# Patient Record
Sex: MALE | Race: WHITE | NOT HISPANIC OR LATINO | Employment: FULL TIME | URBAN - METROPOLITAN AREA
[De-identification: names, ages, dates, MRNs, and addresses within clinical notes are randomized per-mention and may not be internally consistent; named-entity substitution may affect disease eponyms.]

---

## 2017-04-12 ENCOUNTER — HOSPITAL ENCOUNTER (EMERGENCY)
Facility: HOSPITAL | Age: 57
Discharge: HOME | End: 2017-04-12
Attending: EMERGENCY MEDICINE | Admitting: EMERGENCY MEDICINE
Payer: COMMERCIAL

## 2017-04-12 VITALS
WEIGHT: 167 LBS | OXYGEN SATURATION: 96 % | BODY MASS INDEX: 22.13 KG/M2 | RESPIRATION RATE: 18 BRPM | HEART RATE: 96 BPM | SYSTOLIC BLOOD PRESSURE: 149 MMHG | DIASTOLIC BLOOD PRESSURE: 71 MMHG | TEMPERATURE: 98 F | HEIGHT: 73 IN

## 2017-04-12 DIAGNOSIS — S20.211A RIB CONTUSION, RIGHT, INITIAL ENCOUNTER: ICD-10-CM

## 2017-04-12 DIAGNOSIS — R07.89 CHEST WALL PAIN: Primary | ICD-10-CM

## 2017-04-12 PROCEDURE — 99284 EMERGENCY DEPT VISIT MOD MDM: CPT

## 2017-04-12 PROCEDURE — 99900035 HC TECH TIME PER 15 MIN (STAT)

## 2017-04-12 PROCEDURE — 99284 EMERGENCY DEPT VISIT MOD MDM: CPT | Mod: ,,, | Performed by: PHYSICIAN ASSISTANT

## 2017-04-12 PROCEDURE — 25000003 PHARM REV CODE 250: Performed by: PHYSICIAN ASSISTANT

## 2017-04-12 RX ORDER — HYDROCODONE BITARTRATE AND ACETAMINOPHEN 5; 325 MG/1; MG/1
1 TABLET ORAL EVERY 6 HOURS PRN
Qty: 15 TABLET | Refills: 0 | Status: SHIPPED | OUTPATIENT
Start: 2017-04-12 | End: 2017-04-26

## 2017-04-12 RX ORDER — HYDROCODONE BITARTRATE AND ACETAMINOPHEN 5; 325 MG/1; MG/1
1 TABLET ORAL
Status: COMPLETED | OUTPATIENT
Start: 2017-04-12 | End: 2017-04-12

## 2017-04-12 RX ADMIN — HYDROCODONE BITARTRATE AND ACETAMINOPHEN 1 TABLET: 5; 325 TABLET ORAL at 07:04

## 2017-04-12 NOTE — ED PROVIDER NOTES
Encounter Date: 4/12/2017    SCRIBE #1 NOTE: I, Cal Wynne, am scribing for, and in the presence of,  Dr. Cain . I have scribed the following portions of the note - Other sections scribed: CXR Reading .       History     Chief Complaint   Patient presents with    Rib Injury     slipped and hit L ribs on back of truck     Review of patient's allergies indicates:  No Known Allergies  HPI Comments: Patient is a 57-year-old male with history of coronary artery disease who presents to the emergency department with rib injury.  Patient states he was at work this morning.  He states that presently 5:30, he fell off of a ladder from 1.5 feet.  Patient states he fell on his left chest wall.  Patient states he is having 9 out of 10 pain on the left side of his ribs.  Patient reports mild bruising to the area.  Patient states he does not feel short of breath, but when he takes a deep breath he has pain.  Patient denies hitting his head or loss of consciousness.  Patient denies any neck or back pain.    The history is provided by the patient.     Past Medical History:   Diagnosis Date    Coronary artery disease      Past Surgical History:   Procedure Laterality Date    BYPASS GRAFT      triple bypass 2009     History reviewed. No pertinent family history.  Social History   Substance Use Topics    Smoking status: Current Every Day Smoker     Packs/day: 1.50     Years: 20.00     Types: Cigarettes    Smokeless tobacco: Never Used    Alcohol use Yes     Review of Systems   Constitutional: Negative for activity change, appetite change, chills, fatigue and fever.   HENT: Negative for congestion, ear discharge, ear pain, hearing loss, mouth sores, postnasal drip, rhinorrhea, sore throat and trouble swallowing.    Respiratory: Negative for cough and shortness of breath.    Cardiovascular: Negative for chest pain.   Gastrointestinal: Negative for abdominal pain, blood in stool, constipation, diarrhea, nausea and vomiting.    Genitourinary: Negative for dysuria, flank pain and hematuria.   Musculoskeletal: Negative for back pain, neck pain and neck stiffness.        Left Rib cage pain   Skin: Negative for rash and wound.   Neurological: Negative for dizziness, syncope, speech difficulty, weakness, light-headedness, numbness and headaches.       Physical Exam   Initial Vitals   BP Pulse Resp Temp SpO2   04/12/17 0703 04/12/17 0703 04/12/17 0703 04/12/17 0703 04/12/17 0703   149/71 96 18 98.2 °F (36.8 °C) 96 %     Physical Exam    Nursing note and vitals reviewed.  Constitutional: He appears well-developed and well-nourished. He is not diaphoretic.  Non-toxic appearance. No distress.   HENT:   Head: Normocephalic.   Right Ear: Hearing and external ear normal.   Left Ear: Hearing and external ear normal.   Nose: Nose normal.   Mouth/Throat: Uvula is midline and oropharynx is clear and moist. No trismus in the jaw. No uvula swelling. No oropharyngeal exudate.   Eyes: Conjunctivae are normal. Pupils are equal, round, and reactive to light.   Neck: Normal range of motion and full passive range of motion without pain. Neck supple. No spinous process tenderness and no muscular tenderness present. Normal range of motion present. No rigidity.   Cardiovascular: Normal rate and regular rhythm.   Pulmonary/Chest: Breath sounds normal. No respiratory distress. He has no wheezes. He has no rhonchi. He has no rales. He exhibits tenderness (left lateral mid rib cage; no crepitus; mild ecchymosis; no flail chest).   Abdominal: Soft. Bowel sounds are normal. He exhibits no distension and no mass. There is no tenderness. There is no rebound and no guarding.   Musculoskeletal:   No midline tenderness in the cervical, thoracic, or lumbar region.  No step-offs or crepitus noted.  No saddle anesthesia.  Normal strength in upper and lower extremities.       Lymphadenopathy:     He has no cervical adenopathy.   Neurological: He is alert and oriented to person,  place, and time.   Skin: Skin is warm and dry.   Psychiatric: He has a normal mood and affect.         ED Course   Procedures  Labs Reviewed - No data to display       X-Rays:   Independently Interpreted Readings:   Chest X-Ray: post sternotomy evidence noted without pneumothorax.    Other Readings:  No acute cardiopulmonary process    Medical Decision Making:   History:   Old Medical Records: I decided to obtain old medical records.  Initial Assessment:   Urgent evaluation of a 57-year-old male with history of coronary artery disease who presents to the emergency department with wrist injury.  Patient is afebrile, nontoxic appearing, and hemodynamically stable.  Lungs are clear to auscultation.  Lung sounds are equal bilaterally.  I do not suspect pneumothorax.  On exam, there is tenderness in the left lateral mid rib cage with no crepitus.  There is mild ecchymosis.  No flail chest.  X-ray will be obtained and patient will be given analgesic.  No other injury.  Independently Interpreted Test(s):   I have ordered and independently interpreted X-rays - see prior notes.  Clinical Tests:   Radiological Study: Ordered and Reviewed  ED Management:  7:56 AM  I have independently interpreted chest film.  No evidence of rib fracture.  Incentive spirometry training will be performed for patient.  He will be sent home with analgesic.  He is advised to follow-up with PCP or return to the emergency department with any worsening symptoms or concerns.  Case is discussed in depth with supervising physician who agrees with plan.  Other:   I have discussed this case with another health care provider.       <> Summary of the Discussion: Dr. Payton Emery Attestation:   Scribe #1: I performed the above scribed service and the documentation accurately describes the services I performed. I attest to the accuracy of the note.    Attending Attestation:           Physician Attestation for Scribe:  Physician Attestation  Statement for Scribe #1: I, Dr. Cain , reviewed documentation, as scribed by Cal Wynne  in my presence, and it is both accurate and complete.                 ED Course     Clinical Impression:   The primary encounter diagnosis was Chest wall pain. A diagnosis of Rib contusion, right, initial encounter was also pertinent to this visit.          Corrie De Paz PA-C  04/12/17 0823       Corrie De Paz PA-C  04/12/17 0905

## 2017-04-12 NOTE — ED NOTES
Patient identifiers verified and correct for Mina Jo.    LOC: The patient is awake, alert and aware of environment with an appropriate affect, the patient is oriented x 3 and speaking appropriately.  APPEARANCE: Patient resting comfortably and in no acute distress, patient is clean and well groomed, patient's clothing is properly fastened.  SKIN: The skin is warm and dry, color consistent with ethnicity, patient has normal skin turgor and moist mucus membranes, skin intact, no breakdown or bruising noted.  MUSCULOSKELETAL: Patient moving all extremities spontaneously, no obvious swelling or deformities noted. Left sided rib pain with palpation. Red bruise noted to the left lateral side.

## 2017-04-12 NOTE — ED AVS SNAPSHOT
[facility]  [address]  [facility] [address]               Mina Jo   2017  7:04 AM   ED    Description:  Male : 1960   Department:  [facility]           Your Care was Coordinated By:     Provider Role From To    Riley Cain MD Attending Provider 17 --    Corrie Graham-JENNYFER Pacheco Physician Assistant 17 --      Reason for Visit     Rib Injury           Diagnoses this Visit        Comments    Chest wall pain    -  Primary     Rib contusion, right, initial encounter           ED Disposition     None           To Do List           Follow-up Information     Follow up with PCP In 2 days.       These Medications        Disp Refills Start End    hydrocodone-acetaminophen 5-325mg (NORCO) 5-325 mg per tablet 15 tablet 0 2017     Take 1 tablet by mouth every 6 (six) hours as needed for Pain. - Oral    Pharmacy: [facility] #[id number] - [place] - [address] Ph #: [phone]         [facility] On Call     [facility] Care Line -  Assistance  Unless otherwise directed by your provider, please contact [facility], our nurse care line that is available for  assistance.     Registered nurses in the [facility] provide: appointment scheduling, clinical advisement, health education, and other advisory services.  Call: [phone] (toll free)               Medications           Message regarding Medications     Verify the changes and/or additions to your medication regime listed below are the same as discussed with your clinician today.  If any of these changes or additions are incorrect, please notify your healthcare provider.        START taking these NEW medications        Refills    hydrocodone-acetaminophen 5-325mg (NORCO) 5-325 mg per tablet 0    Sig: Take 1 tablet by mouth every 6 (six) hours as needed for Pain.    Class: Print    Route:  "Oral      These medications were administered today        Dose Freq    hydrocodone-acetaminophen 5-325mg per tablet 1 tablet 1 tablet ED 1 Time    Sig: Take 1 tablet by mouth ED 1 Time.    Class: Normal    Route: Oral           Verify that the below list of medications is an accurate representation of the medications you are currently taking.  If none reported, the list may be blank. If incorrect, please contact your healthcare provider. Carry this list with you in case of emergency.           Current Medications     hydrocodone-acetaminophen 5-325mg (NORCO) 5-325 mg per tablet Take 1 tablet by mouth every 6 (six) hours as needed for Pain.           Clinical Reference Information           Your Vitals Were     BP Pulse Temp Resp Height Weight    149/71 96 98.2 °F (36.8 °C) (Oral) 18 6' 1" (1.854 m) 75.8 kg (167 lb)    SpO2 BMI             96% 22.03 kg/m2         Allergies as of 4/12/2017     No Known Allergies      Immunizations Administered on Date of Encounter - 4/12/2017     None      ED Micro, Lab, POCT     None      ED Imaging Orders     Start Ordered       Status Ordering Provider    04/12/17 0731 04/12/17 0730  X-Ray Chest PA And Lateral  1 time imaging      Final result         Discharge Instructions             Chest Contusion    A contusion is a bruise to the skin, muscle, or ribs. It may cause pain, tenderness, and swelling. It may turn the skin purple until it heals. Contusions take a few days to a few weeks to heal.  Home care  Follow these guidelines when caring for yourself at home:  · Rest. Dont do any heavy lifting or strenuous activity. Dont do any activity that causes pain.  · Put an ice pack on the injured area. Do this for 20 minutes every 1 to 2 hours the first day. You can make an ice pack by wrapping a plastic bag of ice cubes in a thin towel. Continue to use the ice pack 3 to 4 times a day for the next 2 days. Then use the ice pack as needed to ease pain and swelling.  · After 1 to 2 days " you may put a warm compress on the area. Do this for 10 minutes several times a day. A warm compress is a clean cloth thats damp with warm water.  · Hold a pillow to the affected area when you cough. This will help ease pain.  · You may use acetaminophen or ibuprofen to control pain, unless another pain medicine was prescribed. If you have chronic liver or kidney disease, talk with your health care provider before using these medicines. Also talk with your provider if youve had a stomach ulcer or GI bleeding.  Follow-up care  Follow up with your health care provider during the next week, or as advised.  When to seek medical advice  Call your health care provider right away if any of these occur:  · Shortness of breath, difficulty breathing, or breathing fast  · Chest pain gets worse when you breathe  · Severe pain that comes on suddenly or lasts more than an hour  · Dizziness, weakness, or fainting  · New abdominal pain or abdominal pain that gets worse  ·  Fever of 101ºF (38.3ºC) or higher, or as directed by your health care provider  Date Last Reviewed: 2/15/2015  © 9744-0809 [employer]. [address] [place] [address]. All rights reserved. This information is not intended as a substitute for professional medical care. Always follow your healthcare professional's instructions.          [facility] Sign-Up     Activating your [facility] account is as easy as 1-2-3!     1) Visit [email or web address], select Sign Up Now, enter this activation code and your date of birth, then select Next.  [email or web address]  Expires: 5/27/2017  7:59 AM      2) Create a username and password to use when you visit [facility] in the future and select a security question in case you lose your password and select Next.    3) Enter your e-mail address and click Sign Up!    Additional Information  If you have questions, please e-mail [email or web address] or call [phone] to talk to our [facility] staff. Remember,  [facility] is NOT to be used for urgent needs. For medical emergencies, dial 911.          [facility] complies with applicable Federal civil rights laws and does not discriminate on the basis of race, color, national origin, age, disability, or sex.        Language Assistance Services     ATTENTION: Language assistance services are available, free of charge. Please call [phone].      ATENCIÓN: Si habla español, tiene a ureña disposición servicios gratuitos de asistencia lingüística. Llame al [phone].     CHÚ Ý: N?u b?n nói Ti?ng Vi?t, có các d?ch v? h? tr? ngôn ng? mi?n phí dành cho b?n. G?i s? [phone].

## 2017-04-12 NOTE — DISCHARGE INSTRUCTIONS
Chest Contusion    A contusion is a bruise to the skin, muscle, or ribs. It may cause pain, tenderness, and swelling. It may turn the skin purple until it heals. Contusions take a few days to a few weeks to heal.  Home care  Follow these guidelines when caring for yourself at home:  · Rest. Dont do any heavy lifting or strenuous activity. Dont do any activity that causes pain.  · Put an ice pack on the injured area. Do this for 20 minutes every 1 to 2 hours the first day. You can make an ice pack by wrapping a plastic bag of ice cubes in a thin towel. Continue to use the ice pack 3 to 4 times a day for the next 2 days. Then use the ice pack as needed to ease pain and swelling.  · After 1 to 2 days you may put a warm compress on the area. Do this for 10 minutes several times a day. A warm compress is a clean cloth thats damp with warm water.  · Hold a pillow to the affected area when you cough. This will help ease pain.  · You may use acetaminophen or ibuprofen to control pain, unless another pain medicine was prescribed. If you have chronic liver or kidney disease, talk with your health care provider before using these medicines. Also talk with your provider if youve had a stomach ulcer or GI bleeding.  Follow-up care  Follow up with your health care provider during the next week, or as advised.  When to seek medical advice  Call your health care provider right away if any of these occur:  · Shortness of breath, difficulty breathing, or breathing fast  · Chest pain gets worse when you breathe  · Severe pain that comes on suddenly or lasts more than an hour  · Dizziness, weakness, or fainting  · New abdominal pain or abdominal pain that gets worse  ·  Fever of 101ºF (38.3ºC) or higher, or as directed by your health care provider  Date Last Reviewed: 2/15/2015  © 3988-1681 [employer]. [address] [place] [address], [place] [address]. All rights reserved. This information is not intended as a  substitute for professional medical care. Always follow your healthcare professional's instructions.

## 2017-04-26 ENCOUNTER — HOSPITAL ENCOUNTER (EMERGENCY)
Facility: HOSPITAL | Age: 57
Discharge: HOME | End: 2017-04-26
Attending: EMERGENCY MEDICINE
Payer: COMMERCIAL

## 2017-04-26 VITALS
RESPIRATION RATE: 15 BRPM | HEIGHT: 72 IN | SYSTOLIC BLOOD PRESSURE: 108 MMHG | OXYGEN SATURATION: 96 % | HEART RATE: 82 BPM | WEIGHT: 165 LBS | DIASTOLIC BLOOD PRESSURE: 61 MMHG | BODY MASS INDEX: 22.35 KG/M2 | TEMPERATURE: 98 F

## 2017-04-26 DIAGNOSIS — S05.02XA CORNEAL ABRASION, LEFT, INITIAL ENCOUNTER: ICD-10-CM

## 2017-04-26 DIAGNOSIS — T15.02XA CORNEAL FB (FOREIGN BODY), LEFT, INITIAL ENCOUNTER: Primary | ICD-10-CM

## 2017-04-26 PROCEDURE — 99284 EMERGENCY DEPT VISIT MOD MDM: CPT | Mod: 25

## 2017-04-26 PROCEDURE — 65220 REMOVE FOREIGN BODY FROM EYE: CPT

## 2017-04-26 PROCEDURE — 25000003 PHARM REV CODE 250: Performed by: EMERGENCY MEDICINE

## 2017-04-26 RX ORDER — PROPARACAINE HYDROCHLORIDE 5 MG/ML
1 SOLUTION/ DROPS OPHTHALMIC
Status: COMPLETED | OUTPATIENT
Start: 2017-04-26 | End: 2017-04-26

## 2017-04-26 RX ORDER — MOXIFLOXACIN 5 MG/ML
1 SOLUTION/ DROPS OPHTHALMIC
Status: DISCONTINUED | OUTPATIENT
Start: 2017-04-26 | End: 2017-04-27 | Stop reason: HOSPADM

## 2017-04-26 RX ORDER — HYDROCODONE BITARTRATE AND ACETAMINOPHEN 5; 325 MG/1; MG/1
1 TABLET ORAL EVERY 6 HOURS PRN
Qty: 12 TABLET | Refills: 0 | Status: SHIPPED | OUTPATIENT
Start: 2017-04-26 | End: 2017-06-01

## 2017-04-26 RX ORDER — MOXIFLOXACIN 5 MG/ML
1 SOLUTION/ DROPS OPHTHALMIC 3 TIMES DAILY
Qty: 15 ML | Refills: 0 | Status: SHIPPED | OUTPATIENT
Start: 2017-04-26 | End: 2017-06-01

## 2017-04-26 RX ADMIN — PROPARACAINE HYDROCHLORIDE 1 DROP: 5 SOLUTION/ DROPS OPHTHALMIC at 10:04

## 2017-04-26 RX ADMIN — FLUORESCEIN SODIUM 1 STRIP: 1 STRIP OPHTHALMIC at 08:04

## 2017-04-26 RX ADMIN — PROPARACAINE HYDROCHLORIDE 1 DROP: 5 SOLUTION/ DROPS OPHTHALMIC at 08:04

## 2017-04-26 NOTE — ED AVS SNAPSHOT
[facility]  [address]  [place] [address]               Mina Jo   2017  8:23 PM   ED    Description:  Male : 1960   Department:  [facility]           Your Care was Coordinated By:     Provider Role From To    Elisha Elias MD Attending Provider 17 --      Reason for Visit     Eye Pain           Diagnoses this Visit        Comments    Corneal FB (foreign body), left, initial encounter    -  Primary       ED Disposition     ED Disposition Condition Comment    Discharge             To Do List           Follow-up Information     Follow up with Primary Doctor No In 1 week(s).        Follow up with Dinorah Marrero MD In 1 day.    Specialty:  Ophthalmology    Contact information:    [address]  [address]  [facility] [id number] [phone]          Follow up with Sanford alfonso MD In 1 day.    Specialty:  Ophthalmology    Contact information:    [address]  [facility] [address]  [phone]         These Medications        Disp Refills Start End    hydrocodone-acetaminophen 5-325mg (NORCO) 5-325 mg per tablet 12 tablet 0 2017     Take 1 tablet by mouth every 6 (six) hours as needed for Pain (Do not drive with this medication). - Oral    Pharmacy: [facility]/pharmacy #[id number] - [place] [facility] [facility] Ph #: [phone]       moxifloxacin (VIGAMOX) 0.5 % ophthalmic solution 15 mL 0 2017     Place 1 drop into the left eye 3 (three) times daily. - Left Eye    Pharmacy: [facility]/pharmacy #[id number] - [place] [address] Ph #: [phone]         [facility]     [facility] Care [facility] -  Assistance  Unless otherwise directed by your provider, please contact [facility], our nurse care line that is available for  assistance.     Registered nurses in the [facility] provide: appointment scheduling, clinical advisement, health education, and  other advisory services.  Call: [phone] (toll free)               Medications           Message regarding Medications     Verify the changes and/or additions to your medication regime listed below are the same as discussed with your clinician today.  If any of these changes or additions are incorrect, please notify your healthcare provider.        START taking these NEW medications        Refills    hydrocodone-acetaminophen 5-325mg (NORCO) 5-325 mg per tablet 0    Sig: Take 1 tablet by mouth every 6 (six) hours as needed for Pain (Do not drive with this medication).    Class: Print    Route: Oral    moxifloxacin (VIGAMOX) 0.5 % ophthalmic solution 0    Sig: Place 1 drop into the left eye 3 (three) times daily.    Class: Print    Route: Left Eye      These medications were administered today        Dose Freq    proparacaine 0.5 % ophthalmic solution 1 drop 1 drop ED 1 Time    Sig: Place 1 drop into both eyes ED 1 Time.    Class: Normal    Route: Both Eyes    fluorescein ophthalmic strip 1 strip 1 strip ED 1 Time    Sig: Place 1 strip into both eyes ED 1 Time.    Class: Normal    Route: Both Eyes    moxifloxacin 0.5 % ophthalmic solution 1 drop 1 drop ED 1 Time    Sig: Place 1 drop into the left eye ED 1 Time.    Class: Normal    Route: Left Eye    proparacaine 0.5 % ophthalmic solution 1 drop 1 drop ED 1 Time    Sig: Place 1 drop into both eyes ED 1 Time.    Class: Normal    Route: Both Eyes           Verify that the below list of medications is an accurate representation of the medications you are currently taking.  If none reported, the list may be blank. If incorrect, please contact your healthcare provider. Carry this list with you in case of emergency.           Current Medications     hydrocodone-acetaminophen 5-325mg (NORCO) 5-325 mg per tablet Take 1 tablet by mouth every 6 (six) hours as needed for Pain (Do not drive with this medication).    moxifloxacin (VIGAMOX) 0.5 % ophthalmic solution Place 1  "drop into the left eye 3 (three) times daily.    moxifloxacin 0.5 % ophthalmic solution 1 drop Place 1 drop into the left eye ED 1 Time.           Clinical Reference Information           Your Vitals Were     BP Pulse Temp Resp Height Weight    108/61 (BP Location: Right arm, Patient Position: Sitting) 82 97.9 °F (36.6 °C) (Oral) 15 6' (1.829 m) 74.8 kg (165 lb)    SpO2 BMI             96% 22.38 kg/m2         Allergies as of 4/26/2017     No Known Allergies      Immunizations Administered on Date of Encounter - 4/26/2017     None      ED Micro, Lab, POCT     None      ED Imaging Orders     Start Ordered       Status Ordering Provider    04/26/17 2102 04/26/17 2101  CT Orbits Sella Post Fossa Without Cont  1 time imaging      Final result         Discharge Instructions           Particle Removed from Eye (Corneal Foreign Body)    A particle got into your eye and stuck to the cornea (the clear part in the front of the eye). Your healthcare provider has removed this particle. The cornea is very sensitive and may still hurt for another 1 to 2 days while it heals.  If a metal particle was in your eye, a "rust ring" may have formed. This may require a second visit for complete removal.  Your healthcare provider may have put an eye patch on your eye. This may help the healing process. But you also may not receive an eye patch. For several types of injuries to the cornea, they are not recommended.  Home care  · You may wear sunglasses to help decrease symptoms while the eye is healing, unless advised otherwise by your healthcare provider.  · You may use acetaminophen or ibuprofen to control pain, unless another pain medicine was prescribed. (Note: If you have chronic liver or kidney disease, or if you have ever had a stomach ulcer or gastrointestinal bleeding, talk with your healthcare provider before using these medicines.)  · If you received an eye patch:  ¨ It should not be left in place for more than 24 hours, unless " advised otherwise by your healthcare provider.  ¨ Always keep your return appointment for patch removal and re-exam. Your eye could be harmed if the patch remains in place longer than advised.  ¨ Do not drive a motor vehicle or operate machinery with the patch in place. You will have difficulty judging distances with only one eye.  ¨ If eye drops or ointment were prescribed, use as directed.  Follow-up care  · No eye patch: If no patch was used, but the pain continues for more than 48 hours, you should have another eye exam.  · Eye patch: If your eye was patched and you were given a return appointment for patch removal and re-exam, do not miss the visit. Again, it could be harmful to your eye if the patch remains in place longer than advised. However, if you were asked to remove the eye patch within 24 hours (or as directed by your healthcare provider), contact your healthcare provider immediately if your pain increases or get worse after removal of the eye patch.  When to seek medical advice  Call your healthcare provider right away if any of these occur:  · Increasing eye pain or pain that does not improve after 24 hours  · Discharge from the eye  · Redness of the eye or swelling of the eyelids  · Worsening vision  Date Last Reviewed: 5/14/2015  © 6686-9606 [employer]. [address] [place] [address], [place] [address]. All rights reserved. This information is not intended as a substitute for professional medical care. Always follow your healthcare professional's instructions.          [facility] Sign-Up     Activating your [facility] account is as easy as 1-2-3!     1) Visit [email or web address], select Sign Up Now, enter this activation code and your date of birth, then select Next.  [email or web address]  Expires: 5/27/2017  7:59 AM      2) Create a username and password to use when you visit [facility] in the future and select a security question in case you lose your password and select Next.    3) Enter your e-mail  address and click Sign Up!    Additional Information  If you have questions, please e-mail [email or web address] or call [phone] to talk to our [facility] staff. Remember, [facility] is NOT to be used for urgent needs. For medical emergencies, dial 911.         Smoking Cessation     If you would like to quit smoking:   You may be eligible for free services if you are a [place] resident and started smoking cigarettes before September 1, 1988.  Call the Smoking Cessation Trust ([facility]) toll free at [phone] [phone] or (119) 569-6325.   Call 9-785-QUIT-NOW if you do not meet the above criteria.   Contact us via email: [email or web address]   View our website for more information: [email or web address]         [facility] complies with applicable Federal civil rights laws and does not discriminate on the basis of race, color, national origin, age, disability, or sex.        Language Assistance Services     ATTENTION: Language assistance services are available, free of charge. Please call [phone].      ATENCIÓN: Si habla español, tiene a ureña disposición servicios gratuitos de asistencia lingüística. Llame al [phone].     CHÚ Ý: N?u b?n nói Ti?ng Vi?t, có các d?ch v? h? tr? ngôn ng? mi?n phí dành cho b?n. G?i s? [phone].

## 2017-04-27 ENCOUNTER — TELEPHONE (OUTPATIENT)
Dept: OPHTHALMOLOGY | Facility: CLINIC | Age: 57
End: 2017-04-27

## 2017-04-27 NOTE — ED TRIAGE NOTES
Left  Eye Pain that started on today. Pt believes he has piece of metal in his eye. Left eye reddened, and painful, no vision changes per pt

## 2017-04-27 NOTE — ED PROVIDER NOTES
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      Review of patient's allergies indicates:  No Known Allergies  HPI Comments: Patient reports he feels something and sees something in his L eye following grinding metal without wearing protective eye equipment today.  Patient denies blurry vision, nausea, vomiting or fever.  He reports he did not note any trauma during the day but a few hours after he left work noted a small foreign body in his eye and tried to remove it with eye wash and cotton ball.  He tried to take some nyquil and go to sleep but the pain was too severe so comes in for evaluation.     Patient is a 57 y.o. male presenting with the following complaint: eye pain.   Eye Pain    This is a new problem. The current episode started several hours ago. The problem has been gradually worsening. The left eye is affected. Injury mechanism: Grinding metal.  The pain is at a severity of 8/10. He does not wear contacts. Associated symptoms include foreign body sensation and eye redness. Pertinent negatives include no blurred vision, no decreased vision, no discharge, no double vision, no photophobia, no nausea and no weakness. He has tried commercial eye wash for the symptoms. The treatment provided no relief.     Past Medical History:   Diagnosis Date    Coronary artery disease      Past Surgical History:   Procedure Laterality Date    BYPASS GRAFT      triple bypass 2009    CARDIAC SURGERY      triple bypass x 2     History reviewed. No pertinent family history.  Social History   Substance Use Topics    Smoking status: Current Every Day Smoker     Packs/day: 1.50     Years: 20.00     Types: Cigarettes    Smokeless tobacco: Never Used    Alcohol use Yes     Review of Systems   Constitutional: Negative for fever.   HENT: Negative for sore throat.    Eyes: Positive for pain, redness and itching.  Negative for blurred vision, double vision, photophobia, discharge and visual disturbance.   Respiratory: Negative for shortness of breath.    Cardiovascular: Negative for chest pain.   Gastrointestinal: Negative for nausea.   Genitourinary: Negative for dysuria.   Musculoskeletal: Negative for back pain.   Skin: Negative for rash.   Neurological: Negative for weakness and headaches.   Hematological: Does not bruise/bleed easily.       Physical Exam   Initial Vitals   BP Pulse Resp Temp SpO2   04/26/17 1947 04/26/17 1947 04/26/17 1947 04/26/17 1947 04/26/17 1947   108/61 82 15 97.9 °F (36.6 °C) 96 %     Physical Exam    Nursing note and vitals reviewed.  Constitutional: He appears well-developed and well-nourished.   HENT:   Head: Normocephalic.   Eyes: EOM are normal. Pupils are equal, round, and reactive to light. Lids are everted and swept, no foreign bodies found. Right eye exhibits no chemosis, no discharge, no exudate and no hordeolum. No foreign body present in the right eye. Left eye exhibits no chemosis, no discharge, no exudate and no hordeolum. Right conjunctiva is not injected. Right conjunctiva has no hemorrhage. Left conjunctiva is injected. Left conjunctiva has a hemorrhage. No scleral icterus.   Fundoscopic exam:       The right eye shows no hemorrhage and no papilledema.        The left eye shows no hemorrhage and no papilledema.   Slit lamp exam:       The left eye shows corneal abrasion, corneal ulcer, foreign body and fluorescein uptake. The left eye shows no hyphema, no hypopyon and no anterior chamber bulge.       Pupil normally shaped and briskly reactive.  No deformity of orbit.    Cardiovascular: Normal rate.   Neurological: He is alert and oriented to person, place, and time. He has normal strength. He displays normal reflexes. No cranial nerve deficit or sensory deficit.         ED Course   Foreign Body  Date/Time: 4/27/2017 12:18 PM  Performed by: STEVE MTZ.  Authorized by: STEVE MTZ  "B.   Consent Done: Yes  Consent: Verbal consent obtained.  Risks and benefits: risks, benefits and alternatives were discussed  Consent given by: patient  Patient understanding: patient states understanding of the procedure being performed  Patient consent: the patient's understanding of the procedure matches consent given  Procedure consent: procedure consent matches procedure scheduled  Imaging studies: imaging studies available  Patient identity confirmed:   Time out: Immediately prior to procedure a "time out" was called to verify the correct patient, procedure, equipment, support staff and site/side marked as required.  Body area: eye    Anesthesia:  Local Anesthetic: proparacaine drops   Patient sedated: no  Patient restrained: no  Localization method: magnification  Removal mechanism: 27-gauge needle, irrigation and moist cotton swab  Eye examined with fluorescein.  Corneal abrasion size: large  Corneal abrasion location: lateral and medial  No residual rust ring present.  Dressing: antibiotic drops  Depth: embedded  Complexity: complex  1 objects recovered.  Post-procedure assessment: foreign body removed  Patient tolerance: Patient tolerated the procedure well with no immediate complications  Comments: Small shard of metal removed, broke into several pieces and required irrigation and gently swabbing with cotton ball.        Labs Reviewed - No data to display      Imaging Results         CT Orbits Sella Post Fossa Without Cont (Final result) Result time:  17 21:40:46    Final result by Deonte Fields MD (17 21:40:46)    Impression:       No evidence of radiopaque foreign body in the orbits.    Age-indeterminate comminuted nasal bone fractures. Suggest clinical correlation.    Paranasal sinus disease as described above.          Electronically signed by: DEONTE FIELDS MD  Date:     17  Time:    21:40     Narrative:    Exam: [id number]  17  21:07:42 [id number] ([facility]) : CT ORBITS SELLA POST " FOSSA WITHOUT CONT    Technique:    Axial CT scan of the orbits were obtained without intravenous contrast. Coronal and Sagittal Reformats were also obtained.     Comparison:     None     Findings:      The visualized intracranial contents are within normal limits.    There is mucosal thickening in the bilateral frontal sinuses and the frontal recesses.  Trace mucosal thickening is also identified in the ethmoid sinuses.  There is also trace mucosal thickening in the left sphenoid sinus.  The reminder of the paranasal sinuses and mastoid air cells are clear.    The orbits and intraorbital contents are within normal limits.  No radiopaque foreign body is identified within the orbits.    The zygomatic arch is unremarkable.  Visualized portions of the mandibular condyles are within normal limits within. The pterygoid plates are unremarkable.  There are comminuted nasal bone fractures.                   Medical Decision Making:   Initial Assessment:   Foreign body of eye and trauma of cornea with corneal abrasion.  Foreign body removed although very small amount of residual foreign body may be present.  NO evidence of globe rupture or radio-opaque foreign body on CT scan. The patient was referred to Dr. Marrero and [facility] eye clinic and told he should followup tomorrow for reexamination and further care.  He states he will comply with this instruction and knows to return at any time for worsening pain, vomiting, headache, visual change or discharge from eye.  Rx norco and topical antibiotics and antibiotics instilled in the ED.                    ED Course     Clinical Impression:   The encounter diagnosis was Corneal FB (foreign body), left, initial encounter.    Disposition:   Disposition: Discharged  Condition: Stable       Elisha Elias MD  05/04/17 0909

## 2017-04-27 NOTE — DISCHARGE INSTRUCTIONS
"    Particle Removed from Eye (Corneal Foreign Body)    A particle got into your eye and stuck to the cornea (the clear part in the front of the eye). Your healthcare provider has removed this particle. The cornea is very sensitive and may still hurt for another 1 to 2 days while it heals.  If a metal particle was in your eye, a "rust ring" may have formed. This may require a second visit for complete removal.  Your healthcare provider may have put an eye patch on your eye. This may help the healing process. But you also may not receive an eye patch. For several types of injuries to the cornea, they are not recommended.  Home care  · You may wear sunglasses to help decrease symptoms while the eye is healing, unless advised otherwise by your healthcare provider.  · You may use acetaminophen or ibuprofen to control pain, unless another pain medicine was prescribed. (Note: If you have chronic liver or kidney disease, or if you have ever had a stomach ulcer or gastrointestinal bleeding, talk with your healthcare provider before using these medicines.)  · If you received an eye patch:  ¨ It should not be left in place for more than 24 hours, unless advised otherwise by your healthcare provider.  ¨ Always keep your return appointment for patch removal and re-exam. Your eye could be harmed if the patch remains in place longer than advised.  ¨ Do not drive a motor vehicle or operate machinery with the patch in place. You will have difficulty judging distances with only one eye.  ¨ If eye drops or ointment were prescribed, use as directed.  Follow-up care  · No eye patch: If no patch was used, but the pain continues for more than 48 hours, you should have another eye exam.  · Eye patch: If your eye was patched and you were given a return appointment for patch removal and re-exam, do not miss the visit. Again, it could be harmful to your eye if the patch remains in place longer than advised. However, if you were asked to " remove the eye patch within 24 hours (or as directed by your healthcare provider), contact your healthcare provider immediately if your pain increases or get worse after removal of the eye patch.  When to seek medical advice  Call your healthcare provider right away if any of these occur:  · Increasing eye pain or pain that does not improve after 24 hours  · Discharge from the eye  · Redness of the eye or swelling of the eyelids  · Worsening vision  Date Last Reviewed: 5/14/2015 © 2000-2016 [employer]. [address] [place] [address]. All rights reserved. This information is not intended as a substitute for professional medical care. Always follow your healthcare professional's instructions.

## 2017-06-01 ENCOUNTER — HOSPITAL ENCOUNTER (OUTPATIENT)
Facility: HOSPITAL | Age: 57
Discharge: HOME | End: 2017-06-02
Attending: EMERGENCY MEDICINE | Admitting: FAMILY MEDICINE
Payer: COMMERCIAL

## 2017-06-01 DIAGNOSIS — R07.9 CHEST PAIN: ICD-10-CM

## 2017-06-01 DIAGNOSIS — R07.9 CHEST PAIN, UNSPECIFIED TYPE: Primary | ICD-10-CM

## 2017-06-01 DIAGNOSIS — R07.89 ATYPICAL CHEST PAIN: ICD-10-CM

## 2017-06-01 PROBLEM — R06.09 DYSPNEA ON EXERTION: Status: ACTIVE | Noted: 2017-06-01

## 2017-06-01 LAB
ALBUMIN SERPL BCP-MCNC: 3.6 G/DL
ALP SERPL-CCNC: 80 U/L
ALT SERPL W/O P-5'-P-CCNC: 28 U/L
ANION GAP SERPL CALC-SCNC: 12 MMOL/L
AST SERPL-CCNC: 26 U/L
BASOPHILS # BLD AUTO: 0.03 K/UL
BASOPHILS NFR BLD: 0.5 %
BILIRUB SERPL-MCNC: 0.3 MG/DL
BNP SERPL-MCNC: 22 PG/ML
BUN SERPL-MCNC: 11 MG/DL
CALCIUM SERPL-MCNC: 9.2 MG/DL
CHLORIDE SERPL-SCNC: 106 MMOL/L
CO2 SERPL-SCNC: 23 MMOL/L
CREAT SERPL-MCNC: 0.8 MG/DL
D DIMER PPP IA.FEU-MCNC: 0.66 MG/L FEU
DIFFERENTIAL METHOD: ABNORMAL
EOSINOPHIL # BLD AUTO: 0.1 K/UL
EOSINOPHIL NFR BLD: 1.7 %
ERYTHROCYTE [DISTWIDTH] IN BLOOD BY AUTOMATED COUNT: 13.2 %
EST. GFR  (AFRICAN AMERICAN): >60 ML/MIN/1.73 M^2
EST. GFR  (NON AFRICAN AMERICAN): >60 ML/MIN/1.73 M^2
GLUCOSE SERPL-MCNC: 105 MG/DL
HCT VFR BLD AUTO: 44.9 %
HGB BLD-MCNC: 16.2 G/DL
LYMPHOCYTES # BLD AUTO: 2.6 K/UL
LYMPHOCYTES NFR BLD: 39.7 %
MCH RBC QN AUTO: 32.7 PG
MCHC RBC AUTO-ENTMCNC: 36.1 %
MCV RBC AUTO: 91 FL
MONOCYTES # BLD AUTO: 0.5 K/UL
MONOCYTES NFR BLD: 6.9 %
NEUTROPHILS # BLD AUTO: 3.3 K/UL
NEUTROPHILS NFR BLD: 51 %
PLATELET # BLD AUTO: 231 K/UL
PMV BLD AUTO: 9.7 FL
POTASSIUM SERPL-SCNC: 4.1 MMOL/L
PROT SERPL-MCNC: 7.6 G/DL
RBC # BLD AUTO: 4.95 M/UL
SODIUM SERPL-SCNC: 141 MMOL/L
TROPONIN I SERPL DL<=0.01 NG/ML-MCNC: 0.01 NG/ML
TROPONIN I SERPL DL<=0.01 NG/ML-MCNC: <0.006 NG/ML
WBC # BLD AUTO: 6.53 K/UL

## 2017-06-01 PROCEDURE — 85025 COMPLETE CBC W/AUTO DIFF WBC: CPT

## 2017-06-01 PROCEDURE — 85379 FIBRIN DEGRADATION QUANT: CPT

## 2017-06-01 PROCEDURE — 93306 TTE W/DOPPLER COMPLETE: CPT

## 2017-06-01 PROCEDURE — G0378 HOSPITAL OBSERVATION PER HR: HCPCS

## 2017-06-01 PROCEDURE — 93010 ELECTROCARDIOGRAM REPORT: CPT | Mod: 76,,, | Performed by: INTERNAL MEDICINE

## 2017-06-01 PROCEDURE — 25000003 PHARM REV CODE 250: Performed by: FAMILY MEDICINE

## 2017-06-01 PROCEDURE — 80053 COMPREHEN METABOLIC PANEL: CPT

## 2017-06-01 PROCEDURE — 25500020 PHARM REV CODE 255: Performed by: FAMILY MEDICINE

## 2017-06-01 PROCEDURE — 99285 EMERGENCY DEPT VISIT HI MDM: CPT

## 2017-06-01 PROCEDURE — 25000003 PHARM REV CODE 250: Performed by: EMERGENCY MEDICINE

## 2017-06-01 PROCEDURE — 94761 N-INVAS EAR/PLS OXIMETRY MLT: CPT

## 2017-06-01 PROCEDURE — 83880 ASSAY OF NATRIURETIC PEPTIDE: CPT

## 2017-06-01 PROCEDURE — 84484 ASSAY OF TROPONIN QUANT: CPT | Mod: 91

## 2017-06-01 PROCEDURE — 36415 COLL VENOUS BLD VENIPUNCTURE: CPT

## 2017-06-01 PROCEDURE — 93005 ELECTROCARDIOGRAM TRACING: CPT

## 2017-06-01 RX ORDER — METOPROLOL TARTRATE 25 MG/1
25 TABLET, FILM COATED ORAL 2 TIMES DAILY
Status: DISCONTINUED | OUTPATIENT
Start: 2017-06-01 | End: 2017-06-02

## 2017-06-01 RX ORDER — MORPHINE SULFATE 2 MG/ML
1 INJECTION, SOLUTION INTRAMUSCULAR; INTRAVENOUS EVERY 6 HOURS PRN
Status: DISCONTINUED | OUTPATIENT
Start: 2017-06-01 | End: 2017-06-02 | Stop reason: HOSPADM

## 2017-06-01 RX ORDER — ASPIRIN 325 MG
325 TABLET ORAL
Status: COMPLETED | OUTPATIENT
Start: 2017-06-01 | End: 2017-06-01

## 2017-06-01 RX ORDER — ATORVASTATIN CALCIUM 40 MG/1
40 TABLET, FILM COATED ORAL DAILY
Status: DISCONTINUED | OUTPATIENT
Start: 2017-06-02 | End: 2017-06-02 | Stop reason: HOSPADM

## 2017-06-01 RX ORDER — SODIUM CHLORIDE 0.9 % (FLUSH) 0.9 %
3 SYRINGE (ML) INJECTION EVERY 8 HOURS
Status: DISCONTINUED | OUTPATIENT
Start: 2017-06-01 | End: 2017-06-02 | Stop reason: HOSPADM

## 2017-06-01 RX ORDER — ASPIRIN 325 MG
325 TABLET ORAL
Status: DISCONTINUED | OUTPATIENT
Start: 2017-06-01 | End: 2017-06-01

## 2017-06-01 RX ORDER — ASPIRIN 81 MG/1
81 TABLET ORAL DAILY
Status: DISCONTINUED | OUTPATIENT
Start: 2017-06-02 | End: 2017-06-02 | Stop reason: HOSPADM

## 2017-06-01 RX ORDER — LISINOPRIL 5 MG/1
5 TABLET ORAL DAILY
Status: DISCONTINUED | OUTPATIENT
Start: 2017-06-02 | End: 2017-06-02 | Stop reason: HOSPADM

## 2017-06-01 RX ADMIN — ASPIRIN 325 MG ORAL TABLET 325 MG: 325 PILL ORAL at 01:06

## 2017-06-01 RX ADMIN — IOHEXOL 100 ML: 350 INJECTION, SOLUTION INTRAVENOUS at 08:06

## 2017-06-01 RX ADMIN — METOPROLOL TARTRATE 25 MG: 25 TABLET ORAL at 08:06

## 2017-06-01 RX ADMIN — SODIUM CHLORIDE, PRESERVATIVE FREE 3 ML: 5 INJECTION INTRAVENOUS at 10:06

## 2017-06-01 NOTE — SUBJECTIVE & OBJECTIVE
Review of Systems   Constitutional: Positive for fatigue. Negative for diaphoresis, fever and unexpected weight change.   Respiratory: Positive for shortness of breath. Negative for cough and wheezing.    Cardiovascular: Positive for chest pain and palpitations. Negative for leg swelling.   Gastrointestinal: Negative.    Genitourinary: Negative.    Musculoskeletal: Positive for myalgias and neck pain. Negative for neck stiffness.   Neurological: Positive for weakness and headaches. Negative for numbness.     Objective:     Vital Signs (Most Recent):  Temp: 97.7 °F (36.5 °C) (06/01/17 1225)  Pulse: 75 (06/01/17 1420)  Resp: 15 (06/01/17 1420)  BP: 116/76 (06/01/17 1420)  SpO2: 96 % (06/01/17 1420) Vital Signs (24h Range):  Temp:  [97.7 °F (36.5 °C)] 97.7 °F (36.5 °C)  Pulse:  [] 75  Resp:  [15-20] 15  SpO2:  [96 %-98 %] 96 %  BP: (113-119)/(68-82) 116/76     Weight: 74.8 kg (165 lb)  Body mass index is 22.38 kg/m².  No intake or output data in the 24 hours ending 06/01/17 1527   Physical Exam   Constitutional: He is oriented to person, place, and time. He appears well-developed and well-nourished. No distress.   HENT:   Head: Normocephalic and atraumatic.   Nose: Nose normal.   Mouth/Throat: No oropharyngeal exudate.   Eyes: Conjunctivae are normal. Right eye exhibits no discharge. Left eye exhibits no discharge.   Neck: Normal range of motion. Neck supple. No JVD present. No tracheal deviation present.   Cardiovascular: Normal rate, regular rhythm, normal heart sounds and intact distal pulses.  Exam reveals no gallop and no friction rub.    No murmur heard.  Pulmonary/Chest: Effort normal and breath sounds normal. No stridor. No respiratory distress. He has no wheezes. He has no rales. He exhibits no tenderness.   Abdominal: Soft. Bowel sounds are normal. He exhibits no distension and no mass. There is no tenderness. There is no guarding.   Musculoskeletal: Normal range of motion. He exhibits no edema,  tenderness or deformity.   Neurological: He is alert and oriented to person, place, and time.   Skin: Skin is warm and dry. No rash noted. He is not diaphoretic. No erythema. No pallor.   Nursing note and vitals reviewed.      Significant Labs:   CBC:   Recent Labs  Lab 06/01/17  1321   WBC 6.53   HGB 16.2   HCT 44.9        CMP:   Recent Labs  Lab 06/01/17  1321      K 4.1      CO2 23      BUN 11   CREATININE 0.8   CALCIUM 9.2   PROT 7.6   ALBUMIN 3.6   BILITOT 0.3   ALKPHOS 80   AST 26   ALT 28   ANIONGAP 12   EGFRNONAA >60     Cardiac Markers:   Recent Labs  Lab 06/01/17  1321   BNP 22       Significant Imaging: CXR: I have reviewed all pertinent results/findings within the past 24 hours and my personal findings are:  Normal  EKG: I have reviewed all pertinent results/findings within the past 24 hours and my personal findings are: T wave inversion consistent with past EKG's and old MI

## 2017-06-01 NOTE — H&P
[facility] Hospital Medicine  H & P    Patient Name: Mina Jo  MRN: [id number]  Patient Class: OP- Observation   Admission Date: 6/1/2017  Length of Stay: 0 days  Attending Physician: Loni Oliver MD  Primary Care Provider: Primary Doctor No        Subjective:     Principal Problem:Atypical chest pain    HPI:  Mr. Jo is a 56 yo  male with a PMHx of CAD s/p CABG who presented to the ED with sharp chest pain (6/10) that started 1 week ago.   The pain is located on the left side of his chest and radiates to his jaw.  He also complains of palpitations, fatigue, headache, claudication, and SOB at rest and on exertion.  He reports that before this episode, he had a history of intermittent chest pain since his CABG.  The patient admits that he was prescribed a list of medications after his CABG procedure, but he has not been compliant with the medications for 3 years.  He reports drinking alcohol, smoking tobacco and marijuana, but denies any other illicit drug use.  The patient denies nausea, vomiting, abdominal pain, lower extremity edema, urinary symptoms, fever, and chills.  The patient also denies PMHx of hypertension, diabetes, and other significant co-morbidities.     Hospital Course:  No notes on file      Review of Systems   Constitutional: Positive for fatigue. Negative for diaphoresis, fever and unexpected weight change.   Respiratory: Positive for shortness of breath. Negative for cough and wheezing.    Cardiovascular: Positive for chest pain and palpitations. Negative for leg swelling.   Gastrointestinal: Negative.    Genitourinary: Negative.    Musculoskeletal: Positive for myalgias and neck pain. Negative for neck stiffness.   Neurological: Positive for weakness and headaches. Negative for numbness.     Objective:     Vital Signs (Most Recent):  Temp: 97.7 °F (36.5 °C) (06/01/17 1225)  Pulse: 75 (06/01/17 1420)  Resp: 15 (06/01/17 1420)  BP: 116/76 (06/01/17  1420)  SpO2: 96 % (06/01/17 1420) Vital Signs (24h Range):  Temp:  [97.7 °F (36.5 °C)] 97.7 °F (36.5 °C)  Pulse:  [] 75  Resp:  [15-20] 15  SpO2:  [96 %-98 %] 96 %  BP: (113-119)/(68-82) 116/76     Weight: 74.8 kg (165 lb)  Body mass index is 22.38 kg/m².  No intake or output data in the 24 hours ending 06/01/17 1527   Physical Exam   Constitutional: He is oriented to person, place, and time. He appears well-developed and well-nourished. No distress.   HENT:   Head: Normocephalic and atraumatic.   Nose: Nose normal.   Mouth/Throat: No oropharyngeal exudate.   Eyes: Conjunctivae are normal. Right eye exhibits no discharge. Left eye exhibits no discharge.   Neck: Normal range of motion. Neck supple. No JVD present. No tracheal deviation present.   Cardiovascular: Normal rate, regular rhythm, normal heart sounds and intact distal pulses.  Exam reveals no gallop and no friction rub.    No murmur heard.  Pulmonary/Chest: Effort normal and breath sounds normal. No stridor. No respiratory distress. He has no wheezes. He has no rales. He exhibits no tenderness.   Abdominal: Soft. Bowel sounds are normal. He exhibits no distension and no mass. There is no tenderness. There is no guarding.   Musculoskeletal: Normal range of motion. He exhibits no edema, tenderness or deformity.   Neurological: He is alert and oriented to person, place, and time.   Skin: Skin is warm and dry. No rash noted. He is not diaphoretic. No erythema. No pallor.   Nursing note and vitals reviewed.      Significant Labs:   CBC:   Recent Labs  Lab 06/01/17  1321   WBC 6.53   HGB 16.2   HCT 44.9        CMP:   Recent Labs  Lab 06/01/17  1321      K 4.1      CO2 23      BUN 11   CREATININE 0.8   CALCIUM 9.2   PROT 7.6   ALBUMIN 3.6   BILITOT 0.3   ALKPHOS 80   AST 26   ALT 28   ANIONGAP 12   EGFRNONAA >60     Cardiac Markers:   Recent Labs  Lab 06/01/17  1321   BNP 22       Significant Imaging: CXR: I have reviewed all  pertinent results/findings within the past 24 hours and my personal findings are:  Normal  EKG: I have reviewed all pertinent results/findings within the past 24 hours and my personal findings are: T wave inversion consistent with past EKG's and old MI    Assessment/Plan:      * Atypical chest pain      Initial trop: <0.006  Trend trop x 2 more times.   EKG did not show anything acute. It was close to his older EKG's. Trend EKG x 2.    Cards was consulted. Will follow their recs.   2D echo ordered and pending.   Initial BNP: 22  Will monitor BNP payam.   ASA, Morphine, ACEI, BB and Atorvastatin started as pt has risk factors.   Stress test ordered                Dyspnea on exertion    Well's Criteria: low risk   D-Dimer ordered to r/o DVT.             VTE Risk Mitigation     None          Aldo Lawson MD  Department of Hospital Medicine   [facility]

## 2017-06-01 NOTE — ASSESSMENT & PLAN NOTE
Initial trop: <0.006  Trend trop x 2 more times.   EKG did not show anything acute. It was close to his older EKG's. Trend EKG x 2.    Cards was consulted. Will follow their recs.   2D echo ordered and pending.   Initial BNP: 22  Will monitor BNP payam.   ASA, Morphine, ACEI, BB and Atorvastatin started as pt has risk factors.

## 2017-06-01 NOTE — PLAN OF CARE
Pt is a new admit from ED. Admit and assessment were done. Pt is on telemetry, NSR, with no ectopy. Pt is not complaining of any chest pain. He is currently stable and will continue to monitor. Will give report to oncoming nurse.
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
## 2017-06-01 NOTE — HPI
Mr. Jo is a 56 yo  male with a PMHx of s/p CABG who presented to the ED with sharp chest pain (6/10) that started 1 week ago.   The pain is located on the left side of his chest and radiates to his jaw.  He also complains of palpitations, fatigue, headache, claudication, and SOB at rest and on exertion.  He reports that before this episode, he had a history of intermittent chest pain since his CABG.  The patient admits that he was prescribed a list of medications after his CABG procedure, but he has not been compliant with the medications for 3 years.  He reports drinking alcohol, smoking tobacco and marijuana, but denies any other illicit drug use.  The patient denies nausea, vomiting, abdominal pain, lower extremity edema, urinary symptoms, fever, and chills.  The patient also denies PMHx of hypertension, diabetes, and other significant co-morbidities.

## 2017-06-02 VITALS
SYSTOLIC BLOOD PRESSURE: 124 MMHG | RESPIRATION RATE: 18 BRPM | OXYGEN SATURATION: 94 % | HEIGHT: 72 IN | DIASTOLIC BLOOD PRESSURE: 75 MMHG | TEMPERATURE: 99 F | HEART RATE: 57 BPM | WEIGHT: 165 LBS | BODY MASS INDEX: 22.35 KG/M2

## 2017-06-02 DIAGNOSIS — R07.9 CHEST PAIN: Primary | ICD-10-CM

## 2017-06-02 LAB
ALBUMIN SERPL BCP-MCNC: 3.4 G/DL
ALP SERPL-CCNC: 82 U/L
ALT SERPL W/O P-5'-P-CCNC: 26 U/L
ANION GAP SERPL CALC-SCNC: 10 MMOL/L
AST SERPL-CCNC: 21 U/L
BASOPHILS # BLD AUTO: 0.03 K/UL
BASOPHILS NFR BLD: 0.4 %
BILIRUB SERPL-MCNC: 0.6 MG/DL
BUN SERPL-MCNC: 15 MG/DL
CALCIUM SERPL-MCNC: 8.8 MG/DL
CHLORIDE SERPL-SCNC: 106 MMOL/L
CO2 SERPL-SCNC: 24 MMOL/L
CREAT SERPL-MCNC: 0.8 MG/DL
DIASTOLIC DYSFUNCTION: NO
DIFFERENTIAL METHOD: ABNORMAL
EOSINOPHIL # BLD AUTO: 0.3 K/UL
EOSINOPHIL NFR BLD: 3.6 %
ERYTHROCYTE [DISTWIDTH] IN BLOOD BY AUTOMATED COUNT: 13 %
EST. GFR  (AFRICAN AMERICAN): >60 ML/MIN/1.73 M^2
EST. GFR  (NON AFRICAN AMERICAN): >60 ML/MIN/1.73 M^2
GLUCOSE SERPL-MCNC: 90 MG/DL
HCT VFR BLD AUTO: 44.3 %
HGB BLD-MCNC: 15.4 G/DL
LYMPHOCYTES # BLD AUTO: 3 K/UL
LYMPHOCYTES NFR BLD: 36 %
MAGNESIUM SERPL-MCNC: 2.1 MG/DL
MCH RBC QN AUTO: 32.1 PG
MCHC RBC AUTO-ENTMCNC: 34.8 %
MCV RBC AUTO: 92 FL
MONOCYTES # BLD AUTO: 0.5 K/UL
MONOCYTES NFR BLD: 6.2 %
NEUTROPHILS # BLD AUTO: 4.5 K/UL
NEUTROPHILS NFR BLD: 53.7 %
PHOSPHATE SERPL-MCNC: 3.6 MG/DL
PLATELET # BLD AUTO: 221 K/UL
PMV BLD AUTO: 9.9 FL
POTASSIUM SERPL-SCNC: 3.8 MMOL/L
PROT SERPL-MCNC: 7 G/DL
RBC # BLD AUTO: 4.8 M/UL
RETIRED EF AND QEF - SEE NOTES: 55 (ref 55–65)
SODIUM SERPL-SCNC: 140 MMOL/L
WBC # BLD AUTO: 8.39 K/UL

## 2017-06-02 PROCEDURE — 84100 ASSAY OF PHOSPHORUS: CPT

## 2017-06-02 PROCEDURE — 85025 COMPLETE CBC W/AUTO DIFF WBC: CPT

## 2017-06-02 PROCEDURE — 83735 ASSAY OF MAGNESIUM: CPT

## 2017-06-02 PROCEDURE — G0378 HOSPITAL OBSERVATION PER HR: HCPCS

## 2017-06-02 PROCEDURE — 80053 COMPREHEN METABOLIC PANEL: CPT

## 2017-06-02 PROCEDURE — 94761 N-INVAS EAR/PLS OXIMETRY MLT: CPT

## 2017-06-02 PROCEDURE — 93351 STRESS TTE COMPLETE: CPT

## 2017-06-02 PROCEDURE — 36415 COLL VENOUS BLD VENIPUNCTURE: CPT

## 2017-06-02 PROCEDURE — 25000003 PHARM REV CODE 250: Performed by: FAMILY MEDICINE

## 2017-06-02 RX ORDER — METOPROLOL TARTRATE 25 MG/1
12.5 TABLET ORAL 2 TIMES DAILY
Status: DISCONTINUED | OUTPATIENT
Start: 2017-06-02 | End: 2017-06-02 | Stop reason: HOSPADM

## 2017-06-02 RX ORDER — LISINOPRIL 5 MG/1
5 TABLET ORAL DAILY
Qty: 30 TABLET | Refills: 2 | Status: ON HOLD | OUTPATIENT
Start: 2017-06-02 | End: 2017-09-26

## 2017-06-02 RX ORDER — SERTRALINE HYDROCHLORIDE 25 MG/1
25 TABLET, FILM COATED ORAL DAILY
Qty: 30 TABLET | Refills: 0 | Status: ON HOLD | OUTPATIENT
Start: 2017-06-02 | End: 2017-09-26

## 2017-06-02 RX ORDER — ASPIRIN 81 MG/1
81 TABLET ORAL DAILY
Refills: 0 | COMMUNITY
Start: 2017-06-02 | End: 2017-06-02

## 2017-06-02 RX ORDER — ATORVASTATIN CALCIUM 40 MG/1
40 TABLET, FILM COATED ORAL DAILY
Qty: 30 TABLET | Refills: 11 | OUTPATIENT
Start: 2017-06-02 | End: 2017-06-02

## 2017-06-02 RX ORDER — METOPROLOL TARTRATE 25 MG/1
12.5 TABLET, FILM COATED ORAL 2 TIMES DAILY
Qty: 30 TABLET | Refills: 0 | Status: ON HOLD | OUTPATIENT
Start: 2017-06-02 | End: 2017-09-26 | Stop reason: HOSPADM

## 2017-06-02 RX ORDER — ATORVASTATIN CALCIUM 40 MG/1
40 TABLET, FILM COATED ORAL DAILY
Qty: 30 TABLET | Refills: 11 | Status: ON HOLD | OUTPATIENT
Start: 2017-06-02 | End: 2017-09-26

## 2017-06-02 RX ORDER — METOPROLOL TARTRATE 25 MG/1
12.5 TABLET, FILM COATED ORAL 2 TIMES DAILY
Qty: 30 TABLET | Refills: 0 | OUTPATIENT
Start: 2017-06-02 | End: 2017-06-02

## 2017-06-02 RX ORDER — ASPIRIN 81 MG/1
81 TABLET ORAL DAILY
Qty: 30 TABLET | Refills: 11 | Status: ON HOLD | OUTPATIENT
Start: 2017-06-02 | End: 2017-09-26 | Stop reason: HOSPADM

## 2017-06-02 RX ORDER — LISINOPRIL 5 MG/1
5 TABLET ORAL DAILY
Qty: 30 TABLET | Refills: 2 | OUTPATIENT
Start: 2017-06-02 | End: 2017-06-02

## 2017-06-02 RX ORDER — SERTRALINE HYDROCHLORIDE 25 MG/1
25 TABLET, FILM COATED ORAL DAILY
Qty: 30 TABLET | Refills: 0 | OUTPATIENT
Start: 2017-06-02 | End: 2017-06-02

## 2017-06-02 RX ADMIN — SODIUM CHLORIDE, PRESERVATIVE FREE 3 ML: 5 INJECTION INTRAVENOUS at 06:06

## 2017-06-02 RX ADMIN — ASPIRIN 81 MG: 81 TABLET, COATED ORAL at 08:06

## 2017-06-02 RX ADMIN — ATORVASTATIN CALCIUM 40 MG: 40 TABLET, FILM COATED ORAL at 08:06

## 2017-06-02 RX ADMIN — LISINOPRIL 5 MG: 5 TABLET ORAL at 08:06

## 2017-06-02 NOTE — PLAN OF CARE
Discharge orders noted, no HH or HME ordered.       06/02/17 1236   Final Note   Assessment Type Final Discharge Note   Discharge Disposition Home   What phone number can be called within the next 1-3 days to see how you are doing after discharge? [phone]   Hospital Follow Up  Appt(s) scheduled? No  (pt will call to set up follow up appt)   Discharge/Hospital Encounter Summary to (non-[facility]) PCP n/a   Referral to Outpatient Case Management complete? n/a   Referral to / orders for Home Health Complete? n/a   30 day supply of medicines given at discharge, if documented non-compliance / non-adherence? n/a   Any social issues identified prior to discharge? n/a   Did you assess the readiness or willingness of the family or caregiver to support self management of care? n/a   Right Care Referral Info   Post Acute Recommendation No Care     Elisha Blankenship, RN Transitional Navigator  [phone]

## 2017-06-02 NOTE — SUBJECTIVE & OBJECTIVE
Interval History:   Pt has been doing good overnight. He says his chest pain and SOB has been resolved. He only had 2 episodes throughout his stay and they were resolved within few mins. He denies any CP/SOB/F/C/N/V/HA.     Review of Systems  Objective:     Vital Signs (Most Recent):  Temp: 98.5 °F (36.9 °C) (06/02/17 0719)  Pulse: 63 (06/02/17 0719)  Resp: 18 (06/02/17 0719)  BP: 124/75 (06/02/17 0719)  SpO2: (!) 94 % (06/02/17 0637) Vital Signs (24h Range):  Temp:  [97.7 °F (36.5 °C)-98.6 °F (37 °C)] 98.5 °F (36.9 °C)  Pulse:  [] 63  Resp:  [15-20] 18  SpO2:  [93 %-98 %] 94 %  BP: (113-138)/(65-82) 124/75     Weight: 74.8 kg (165 lb)  Body mass index is 22.38 kg/m².    Intake/Output Summary (Last 24 hours) at 06/02/17 0817  Last data filed at 06/02/17 0500   Gross per 24 hour   Intake              730 ml   Output              400 ml   Net              330 ml      Physical Exam    Significant Labs:   CBC:   Recent Labs  Lab 06/01/17  1321 06/02/17  0356   WBC 6.53 8.39   HGB 16.2 15.4   HCT 44.9 44.3    221     CMP:   Recent Labs  Lab 06/01/17  1321 06/02/17  0356    140   K 4.1 3.8    106   CO2 23 24    90   BUN 11 15   CREATININE 0.8 0.8   CALCIUM 9.2 8.8   PROT 7.6 7.0   ALBUMIN 3.6 3.4*   BILITOT 0.3 0.6   ALKPHOS 80 82   AST 26 21   ALT 28 26   ANIONGAP 12 10   EGFRNONAA >60 >60     Cardiac Markers:   Recent Labs  Lab 06/01/17  1321   BNP 22       Significant Imaging: Echo: I have reviewed all pertinent results/findings within the past 24 hours and my personal findings are:  pending  EKG: I have reviewed all pertinent results/findings within the past 24 hours and my personal findings are: No acute changes

## 2017-06-02 NOTE — PLAN OF CARE
Pt is being discharged. Removed IV, tip intact, tolerated well. Removed tele, no ectopy. Went over discharge instructions with pt, verbalized understanding.

## 2017-06-02 NOTE — PLAN OF CARE
Problem: Pain, Acute (Adult)  Goal: Identify Related Risk Factors and Signs and Symptoms  Related risk factors and signs and symptoms are identified upon initiation of Human Response Clinical Practice Guideline (CPG)  Outcome: Ongoing (interventions implemented as appropriate)  Pt VSS and no acute events overnight. Pt went for CT of chest last night. Pt c/o intermittent, throbbing midsternal chest pain rated 3/10. Pt refused pain medication. By morning pt denies chest pain, no n/v, no dizziness, and ELLIOTT/SOB improving. Pt aware of plan for stress test today. Plan of care reviewed with patient. Patient verbalized complete understanding. Fall precautions maintained. Bed in lowest position, locked, call light within reach, and bed alarm on. Side rails up x's 2 with slip resistant socks on. Nurse instructed patient to notify staff for any assistance and pt verbalized complete understanding.     Tele Recap:  NSR, SB  60's- 50's overnight.   No ectopy noted.     Rn to report to oncoming Rn.

## 2017-06-02 NOTE — PROGRESS NOTES
[facility] Hospital Medicine  Progress Note    Patient Name: Mina Jo  MRN: [id number]  Patient Class: OP- Observation   Admission Date: 6/1/2017  Length of Stay: 0 days  Attending Physician: Gabriel Marlow MD  Primary Care Provider: Primary Doctor No        Subjective:     Principal Problem:Atypical chest pain    HPI:  Mr. Jo is a 58 yo  male with a PMHx of s/p CABG who presented to the ED with sharp chest pain (6/10) that started 1 week ago.   The pain is located on the left side of his chest and radiates to his jaw.  He also complains of palpitations, fatigue, headache, claudication, and SOB at rest and on exertion.  He reports that before this episode, he had a history of intermittent chest pain since his CABG.  The patient admits that he was prescribed a list of medications after his CABG procedure, but he has not been compliant with the medications for 3 years.  He reports drinking alcohol, smoking tobacco and marijuana, but denies any other illicit drug use.  The patient denies nausea, vomiting, abdominal pain, lower extremity edema, urinary symptoms, fever, and chills.  The patient also denies PMHx of hypertension, diabetes, and other significant co-morbidities.     Hospital Course:  No notes on file    Interval History:   Pt has been doing good overnight. He says his chest pain and SOB has been resolved. He only had 2 episodes throughout his stay and they were resolved within few mins. He denies any CP/SOB/F/C/N/V/HA.     Review of Systems  Objective:     Vital Signs (Most Recent):  Temp: 98.5 °F (36.9 °C) (06/02/17 0719)  Pulse: 63 (06/02/17 0719)  Resp: 18 (06/02/17 0719)  BP: 124/75 (06/02/17 0719)  SpO2: (!) 94 % (06/02/17 0637) Vital Signs (24h Range):  Temp:  [97.7 °F (36.5 °C)-98.6 °F (37 °C)] 98.5 °F (36.9 °C)  Pulse:  [] 63  Resp:  [15-20] 18  SpO2:  [93 %-98 %] 94 %  BP: (113-138)/(65-82) 124/75     Weight: 74.8 kg (165 lb)  Body mass index is  22.38 kg/m².    Intake/Output Summary (Last 24 hours) at 06/02/17 0817  Last data filed at 06/02/17 0500   Gross per 24 hour   Intake              730 ml   Output              400 ml   Net              330 ml      Physical Exam    Significant Labs:   CBC:   Recent Labs  Lab 06/01/17  1321 06/02/17  0356   WBC 6.53 8.39   HGB 16.2 15.4   HCT 44.9 44.3    221     CMP:   Recent Labs  Lab 06/01/17  1321 06/02/17  0356    140   K 4.1 3.8    106   CO2 23 24    90   BUN 11 15   CREATININE 0.8 0.8   CALCIUM 9.2 8.8   PROT 7.6 7.0   ALBUMIN 3.6 3.4*   BILITOT 0.3 0.6   ALKPHOS 80 82   AST 26 21   ALT 28 26   ANIONGAP 12 10   EGFRNONAA >60 >60     Cardiac Markers:   Recent Labs  Lab 06/01/17  1321   BNP 22       Significant Imaging: Echo: I have reviewed all pertinent results/findings within the past 24 hours and my personal findings are:  pending  EKG: I have reviewed all pertinent results/findings within the past 24 hours and my personal findings are: No acute changes    Assessment/Plan:      * Atypical chest pain      Initial trop: <0.006  Trend trop x 2: WNL  EKG did not show anything acute. It was close to his older EKG's. Trend EKG x 2: no acute changes  Cards was consulted. Will follow up with them after the Stress test.    2D echo: pending.   Initial BNP: 22  Continue ASA, Morphine, ACEI, BB and Atorvastatin             Dyspnea on exertion    Well's Criteria: low risk   D-Dimer: 0.66  CTA ordered last night: Normal results no PE            VTE Risk Mitigation         Ordered     Medium Risk of VTE  Once      06/01/17 1742     Place ZACK hose  Until discontinued      06/01/17 1742          Aldo Lawson MD  Department of Hospital Medicine   [facility]

## 2017-06-02 NOTE — ASSESSMENT & PLAN NOTE
Initial trop: <0.006  Trend trop x 2: WNL  EKG did not show anything acute. It was close to his older EKG's. Trend EKG x 2: no acute changes  Cards was consulted. Will follow up with them after the Stress test.    2D echo: pending.   Initial BNP: 22  Continue ASA, Morphine, ACEI, BB and Atorvastatin

## 2017-06-02 NOTE — MEDICAL/APP STUDENT
[facility] Cardiology Medical Student Progress Note    Mr. Jo is a 57 year old man with PMHx of HTN, HLD, and CAD s/p CABG x3 in 2011 who presented on 6/1/17 with worsening left-sided chest pain x 1 week and increased dyspnea on exertion. Chest pain was intermittent and did not change with positioning, inspiration, or cough. Reports that the chest pain feels the same as it did before his CABG. Says that he did not have any episodes of chest pain in between his CABG surgery and his presentation to the ED. Denied any nausea/vomiting, diaphoresis, orthopnea, nocturnal paroxysmal dyspnea, or extremity edema. Smokes 1.5 PPD x 20 years.    This morning, reports feeling well and back to his usual state of health. Says that he experienced 3 episodes of chest fluttering with SOB between 10 pm and 5 am that resolved on their own. Denies any chest pain, nausea/vomiting, or diaphoresis during these episodes.     Vitals:   Afebrile  HR   Resp: 15-20  Bp: 113-138/65-82  SpO2: 93-98%    Physical Exam:  General: well-appearing, in NAD  HEENT: normocephalic, EOM intact, oropharynx without erythema or exudates  Neck: supple, no JVD present, no carotid bruits  CV: RRR without murmurs, PMI in midclavicular line  Pulm: normal inspiratory effort with coarse breath sounds bilaterally  Abdomen: soft, non-tender, non-distended with active bowel sounds  Extremities: no edema with 2+ pulses  MSK: no tenderness to palpation of chest wall   Neuro: AOx3, no focal deficits    Medications:  Aspirin 81 mg qd, atorvastatin 40 mg qd, lisinopril 5 mg qd, and metoprolol tartate 25 mg BID (held this AM for upcoming stress test)    Labs:  WBC: 8.39  H/H 15.4/44.3  Platelets: 221  CMP WNL except albumin slightly decreased at 3.4    Troponins 6/1: <0.006, 0.008  D-dimer 6/1: elevated at 0.66  BNP 6/1: 22    Images:  CXR 6/1: no acute cardiopulmonary process identified  CTA 6/1: no evidence of Pe, nonspecific enlarged hilar lymph nodes on right side  but mostly stable when compared to previous images  EKG on admission 6/1: normal sinus rhythm with right ventricular conduction abnormality in V1  Most recent EKG 6/1 17:56: normal sinus rhythm, RSR pattern in V1 is no longer present    Assessment/Plan:     1) Atypical chest pain  Pt states that his chest pain feels similar to previous cardiac pain. Currently feels well and in his usual state of health. Troponins have been WNL. Although D-dimer was elevated, there was no evidence of PE on CTA.   -Will do exercise stress echo this AM to further assess.    2) HTN  -Continue lisinopril 5 mg daily  -Hold metoprolol this AM in preparation for exercise stress echo     3) HLD  -Continue atorvastatin 40 mg qd    4) CAD  -Aspirin 81 mg daily    Viktoria Carrasco, L3 Medical Student

## 2017-06-02 NOTE — PROGRESS NOTES
Stress echo: walked for 10 minutes; no EKG changes; normal Echo: negative stress echo at a high workload. Needs ASA, statin. As for beta blockers, with no hypertension and no ischemia, does he he need beta blockers for life. The answer is no.thus, continue ASA and statin alone. Needs smoking cessation program. Followup with primary

## 2017-06-02 NOTE — PROGRESS NOTES
[facility] Cardiology Consult - Resident Note    Primary Team: [facility] Family Med  Consultant Attending: Dr. Yoseph Weeks  Consultant Fellow: Cheikh Collado  Consultant Resident: Alin Judd    Assessment:     Mina Jo is a 57 y.o. male with h/o HTN, HLD, CAD s/p CABG x 3 unk anatomy 5-6 years prior and med noncompliant since who presented with atypical chest pain.    Plan:     Atypical Chest Pain  - TTE pending, Treadmill stress ECHO this AM (enzymes negative)  - ASA + metoprolol 12.5 mg BID, held AM dose for stress  - start high dose statin  - consider ACEi outpatient, needs primary care physician and outpatient follow up  - chart history reviewed    Thank you for allowing us to participate in the care of this patient. Please contact me if you have any questions regarding this consult.    Abelino Judd  [facility] Internal Medicine HO-III    Subjective:      No more episodes of chest pain. Endorses palpitations. No abnormal rhythms on telemetry, reviewed.  Denies leg swelling or pain. Denies sob.    Objective:   Last 24 Hour Vital Signs:  BP  Min: 113/82  Max: 138/78  Temp  Av.2 °F (36.8 °C)  Min: 97.7 °F (36.5 °C)  Max: 98.6 °F (37 °C)  Pulse  Av.4  Min: 56  Max: 106  Resp  Av.1  Min: 15  Max: 20  SpO2  Av.4 %  Min: 93 %  Max: 98 %  Height  Av' (182.9 cm)  Min: 6' (182.9 cm)  Max: 6' (182.9 cm)  Weight  Av.8 kg (165 lb)  Min: 74.8 kg (165 lb)  Max: 74.8 kg (165 lb)  I/O last 3 completed shifts:  In: 730 [P.O.:730]  Out: 400 [Urine:400]    Physical Examination:  Gen: WDWN, NAD  Head: NCAT  Eyes: no scleral icterus or conjunctival pallor  Neck: supple, trachea midline, no thyromegaly  CV: RRR normal s1, s2, no s3, no s4, no murmurs  Lungs: nonlabored breathing with equal chest rise, clear to anterior auscultation  Abd: soft, NT, ND  Ext: no cce, wwp  Psych: normal mood and affect  Skin: no obvious rashes or lesions  Neuro: AAOx3, afocal to brief exam    Laboratory  Results:    Recent Labs  Lab 06/01/17  1321 06/02/17  0356   WBC 6.53 8.39   HGB 16.2 15.4   HCT 44.9 44.3    221   MCV 91 92   RDW 13.2 13.0    140   K 4.1 3.8    106   CO2 23 24   BUN 11 15    90   PROT 7.6 7.0   ALBUMIN 3.6 3.4*   BILITOT 0.3 0.6   AST 26 21   ALKPHOS 80 82   ALT 28 26       Recent Labs  Lab 06/01/17  1321 06/01/17  1635 06/01/17  2244   TROPONINI <0.006  <0.006 0.008 <0.006  <0.006   BNP 22  --   --      Radiology: Imagines reviewed personally.    Cta Chest Non Coronary    Result Date: 6/1/2017  CTA chest noncoronary Clinical history: Shortness of breath Comparison: 9/9/2009 Technique: Axial images of the thorax were obtained at 1.25 mm intervals during administration of 100 cc Omni 350 IV contrast following CTA chest noncoronary protocol. Findings: The structures of the base of the neck are unremarkable.  No significant mediastinal lymphadenopathy noting several nonenlarged upper limit of normal mediastinal lymph nodes.  There is calcification in the distribution of the coronary arteries.  There is calcification of the aortic arch with asymmetric plaque involving the arch, narrowing the lumen by less than 50%.  There is scattered atherosclerotic calcification and plaque throughout the remainder of the visualized thoracoabdominal aorta.  The celiac axis and SMA are patent at their origins.  The visualized portions of the spleen, pancreas, gallbladder, adrenal glands and kidneys are grossly unremarkable.  The liver is grossly unremarkable given the phase of contrast. The airways are patent.  There is biapical atelectasis/scarring.  There is bilateral lower lobe dependent atelectasis.  No large focal consolidation.  No pneumothorax.  There is a poor nodule within the left apex measuring approximately 0.4 cm, grossly unchanged since examination of 2009 strongly favoring a benign etiology. Bolus timing is adequate for the evaluation of pulmonary thromboembolism.  There is  no pulmonary arterial filling defect to the level of the segmental arteries, and the majority of the subsegmental arteries to suggest pulmonary thromboembolism noting subvalvular and of the distal subsegmental arteries is limited by motion artifact. Postsurgical changes are again noted the mediastinum.  There are a few scattered minimally prominent hilar lymph nodes, particularly on the right, enlarged since the previous examination, however remain nonspecific. Degenerative changes are noted of the thoracic spine.  Sternotomy wires are noted, some of which appear either replaced or there has been interval improved approximation of the sternum.  No significant axillary lymphadenopathy.    1.  No evidence of pulmonary thromboembolism. 2.  Scattered upper limit of normal caliber mediastinal and hilar lymph nodes, overall, grossly stable as compared to previous examination noting there may be slight interval enlargement of a right hilar lymph node versus slice selection.  Overall, this finding is nonspecific. 3.  Several additional findings above. Electronically signed by: MORGAN ANDREA MD Date:     06/01/17 Time:    20:35     X-ray Chest Ap Portable    Result Date: 6/1/2017  Chest AP single view.  Comparison: 4/12/17. Postsurgical changes with loss consistent with prior sternotomy and CABG procedure.  Cardiac silhouette is normal in size.  Lungs are symmetrically expanded.  No evidence of focal consolidative process, pneumothorax, or significant effusion.  Bones show DJD.  No free air visualized beneath the diaphragm.    No acute cardiopulmonary process identified. Electronically signed by: ELIZABETH JADE MD Date:     06/01/17 Time:    13:37

## 2017-06-02 NOTE — PLAN OF CARE
Patient presents with    Chest Pain       C/o left chest pain that has been gradually worsening x1 week. Also c/o SOB on exertion     Hx of HTN, HLD, CAD s/p CABG x 3 unknown anatomy 5-6 years prior    Pt presents to [facility] with a primary complaint of Chest Pain (C/o left chest pain that has been gradually worsening x1 week. Also c/o SOB on exertion)       06/02/17 1058   Discharge Assessment   Assessment Type Discharge Planning Assessment   Confirmed/corrected address and phone number on facesheet? Yes   Assessment information obtained from? Patient   Expected Length of Stay (days) 2   Communicated expected length of stay with patient/caregiver yes   Prior to hospitilization cognitive status: Alert/Oriented   Prior to hospitalization functional status: Independent   Current cognitive status: Alert/Oriented   Current Functional Status: Independent   Arrived From home or self-care   Lives With significant other   Able to Return to Prior Arrangements yes   Is patient able to care for self after discharge? Yes   How many people do you have in your home that can help with your care after discharge? 1   Who are your caregiver(s) and their phone number(s)? CLARENCE Hogan  [phone]   Readmission Within The Last 30 Days no previous admission in last 30 days   Patient currently being followed by outpatient case management? No   Patient currently receives home health services? No   Does the patient currently use HME? No   Patient currently receives private duty nursing? No   Patient currently receives any other outside agency services? No   Equipment Currently Used at Home none   Do you have any problems affording any of your prescribed medications? No   Is the patient taking medications as prescribed? yes   Do you have any financial concerns preventing you from receiving the healthcare you need? No   Does the patient have transportation to healthcare appointments? Yes   Transportation Available family or friend will  provide   On Dialysis? No   Does the patient receive services at the Coumadin Clinic? No   Are there any open cases? No   Discharge Plan A Home   Discharge Plan B Home with family     Elisha Blankenship RN Transitional Navigator  [phone]

## 2017-06-04 LAB
DIASTOLIC DYSFUNCTION: NO
ESTIMATED PA SYSTOLIC PRESSURE: 20.98
RETIRED EF AND QEF - SEE NOTES: 55 (ref 55–65)
TRICUSPID VALVE REGURGITATION: NORMAL

## 2017-06-05 NOTE — DISCHARGE SUMMARY
[facility] Hospital Medicine  Discharge Summary      Patient Name: Mina Jo  MRN: [id number]  Admission Date: 6/1/2017  Hospital Length of Stay: 0 days  Discharge Date and Time: 6/2/2017 12:17 PM  Attending Physician: No att. providers found   Discharging Provider: Aldo Lawson MD  Primary Care Provider: Primary Doctor Alycia      HPI:   Mr. Jo is a 56 yo  male with a PMHx of s/p CABG who presented to the ED with sharp chest pain (6/10) that started 1 week ago.   The pain is located on the left side of his chest and radiates to his jaw.  He also complains of palpitations, fatigue, headache, claudication, and SOB at rest and on exertion.  He reports that before this episode, he had a history of intermittent chest pain since his CABG.  The patient admits that he was prescribed a list of medications after his CABG procedure, but he has not been compliant with the medications for 3 years.  He reports drinking alcohol, smoking tobacco and marijuana, but denies any other illicit drug use.  The patient denies nausea, vomiting, abdominal pain, lower extremity edema, urinary symptoms, fever, and chills.  The patient also denies PMHx of hypertension, diabetes, and other significant co-morbidities.     * No surgery found *      Indwelling Lines/Drains at time of discharge:   Lines/Drains/Airways          No matching active lines, drains, or airways        Hospital Course:   Atypical Chest pain:  Pt was worked up for ACS r/o. All his cardiac enzymes and Ekg's were WNl. All his cardiac work up was negative. Stress echo was also performed with unremarkable results. He was also worked up for possible PE, but his CTA was negative as well. He was sent home with new medication for his heart and he was told to follow up with us and Dr. Craig. He has not been seeing a PCP; he will follow up with us in the clinic.     Consults:     Significant Diagnostic Studies: Labs:   CMP No results for input(s):  NA, K, CL, CO2, GLU, BUN, CREATININE, CALCIUM, PROT, ALBUMIN, BILITOT, ALKPHOS, AST, ALT, ANIONGAP, ESTGFRAFRICA, EGFRNONAA in the last 48 hours., CBC No results for input(s): WBC, HGB, HCT, PLT in the last 48 hours. and Troponin   Recent Labs  Lab 06/01/17  2244   TROPONINI <0.006  <0.006         Pending Diagnostic Studies:     None        Final Active Diagnoses:    Diagnosis Date Noted POA    PRINCIPAL PROBLEM:  Atypical chest pain [R07.89] 06/01/2017 Unknown    Dyspnea on exertion [R06.09] 06/01/2017 Unknown    Chest pain [R07.9] 06/01/2017 Yes      Problems Resolved During this Admission:    Diagnosis Date Noted Date Resolved POA      No new Assessment & Plan notes have been filed under this hospital service since the last note was generated.  Service: Hospital Medicine      Discharged Condition: good    Disposition: Home or Self Care    Follow Up:  Follow-up Information     Aldo Lawson MD. Schedule an appointment as soon as possible for a visit in 3 days.    Specialty:  Family Medicine  Contact information:  [address]  [place] [address]  [phone]             Yoseph Weeks MD In 1 week.    Specialty:  Cardiology  Contact information:  [address]  [address]  [place] [address]  [phone]                 Patient Instructions:     Diet general     Call MD for:  temperature >100.4     Call MD for:  persistent nausea and vomiting or diarrhea     Call MD for:  severe uncontrolled pain     Call MD for:  redness, tenderness, or signs of infection (pain, swelling, redness, odor or green/yellow discharge around incision site)     Call MD for:  difficulty breathing or increased cough     Call MD for:  severe persistent headache     Call MD for:  persistent dizziness, light-headedness, or visual disturbances     Call MD for:  increased confusion or weakness     Complete PFT w/o bronchodilator   Standing Status: Future  Standing Exp. Date: 06/02/18        Medications:  Reconciled Home Medications:   Discharge Medication List as of 6/2/2017 11:25 AM      CONTINUE these medications which have CHANGED    Details   aspirin (ECOTRIN) 81 MG EC tablet Take 1 tablet (81 mg total) by mouth once daily., Starting Fri 6/2/2017, Until Sat 6/2/2018, Print      atorvastatin (LIPITOR) 40 MG tablet Take 1 tablet (40 mg total) by mouth once daily., Starting Fri 6/2/2017, Until Sat 6/2/2018, Print      lisinopril (PRINIVIL,ZESTRIL) 5 MG tablet Take 1 tablet (5 mg total) by mouth once daily., Starting Fri 6/2/2017, Until Sun 7/2/2017, Print      metoprolol tartrate (LOPRESSOR) 25 MG tablet Take 0.5 tablets (12.5 mg total) by mouth 2 (two) times daily., Starting Fri 6/2/2017, Until Sun 7/2/2017, Print      sertraline (ZOLOFT) 25 MG tablet Take 1 tablet (25 mg total) by mouth once daily., Starting Fri 6/2/2017, Until Sun 7/2/2017, Print           Time spent on the discharge of patient: 30 minutes    Aldo Lawson MD  Department of Hospital Medicine  [facility]

## 2017-09-24 ENCOUNTER — HOSPITAL ENCOUNTER (INPATIENT)
Facility: HOSPITAL | Age: 57
LOS: 1 days | Discharge: HOME | DRG: 063 | End: 2017-09-26
Attending: EMERGENCY MEDICINE | Admitting: NURSE PRACTITIONER
Payer: COMMERCIAL

## 2017-09-24 DIAGNOSIS — I63.312 CEREBRAL INFARCTION DUE TO THROMBOSIS OF LEFT MIDDLE CEREBRAL ARTERY: ICD-10-CM

## 2017-09-24 DIAGNOSIS — R47.01 APHASIA: ICD-10-CM

## 2017-09-24 DIAGNOSIS — I10 HTN (HYPERTENSION): ICD-10-CM

## 2017-09-24 DIAGNOSIS — R47.1 DYSARTHRIA: ICD-10-CM

## 2017-09-24 DIAGNOSIS — R00.1 BRADYCARDIA: ICD-10-CM

## 2017-09-24 DIAGNOSIS — I10 ESSENTIAL HYPERTENSION: ICD-10-CM

## 2017-09-24 DIAGNOSIS — I63.9 CEREBROVASCULAR ACCIDENT (CVA), UNSPECIFIED MECHANISM: ICD-10-CM

## 2017-09-24 DIAGNOSIS — I63.9 CVA (CEREBRAL VASCULAR ACCIDENT): ICD-10-CM

## 2017-09-24 LAB
BUN SERPL-MCNC: 11 MG/DL (ref 6–30)
CHLORIDE SERPL-SCNC: 105 MMOL/L (ref 95–110)
CREAT SERPL-MCNC: 0.9 MG/DL (ref 0.5–1.4)
CREAT SERPL-MCNC: 0.9 MG/DL (ref 0.5–1.4)
GLUCOSE SERPL-MCNC: 178 MG/DL (ref 70–110)
HCT VFR BLD CALC: 44 %PCV (ref 36–54)
POC IONIZED CALCIUM: 1.16 MMOL/L (ref 1.06–1.42)
POC TCO2 (MEASURED): 20 MMOL/L (ref 23–29)
POTASSIUM BLD-SCNC: 3.4 MMOL/L (ref 3.5–5.1)
SAMPLE: ABNORMAL
SAMPLE: NORMAL
SODIUM BLD-SCNC: 140 MMOL/L (ref 136–145)

## 2017-09-24 PROCEDURE — 82565 ASSAY OF CREATININE: CPT

## 2017-09-24 PROCEDURE — 93005 ELECTROCARDIOGRAM TRACING: CPT

## 2017-09-24 PROCEDURE — 82803 BLOOD GASES ANY COMBINATION: CPT

## 2017-09-24 PROCEDURE — 99291 CRITICAL CARE FIRST HOUR: CPT | Mod: ,,, | Performed by: EMERGENCY MEDICINE

## 2017-09-24 PROCEDURE — 99291 CRITICAL CARE FIRST HOUR: CPT | Mod: 25

## 2017-09-24 PROCEDURE — 37195 THROMBOLYTIC THERAPY STROKE: CPT

## 2017-09-24 PROCEDURE — 93010 ELECTROCARDIOGRAM REPORT: CPT | Mod: ,,, | Performed by: INTERNAL MEDICINE

## 2017-09-24 PROCEDURE — 85610 PROTHROMBIN TIME: CPT

## 2017-09-24 PROCEDURE — 20000000 HC ICU ROOM

## 2017-09-24 PROCEDURE — 99900035 HC TECH TIME PER 15 MIN (STAT)

## 2017-09-24 RX ADMIN — ALTEPLASE 67.5 MG: KIT at 11:09

## 2017-09-24 RX ADMIN — ALTEPLASE 7.5 MG: KIT at 11:09

## 2017-09-25 PROBLEM — R47.01 APHASIA: Status: ACTIVE | Noted: 2017-09-25

## 2017-09-25 PROBLEM — R47.1 DYSARTHRIA: Status: ACTIVE | Noted: 2017-09-25

## 2017-09-25 PROBLEM — I63.9 CVA (CEREBRAL VASCULAR ACCIDENT): Status: ACTIVE | Noted: 2017-09-25

## 2017-09-25 PROBLEM — I25.10 CORONARY ARTERY DISEASE INVOLVING NATIVE CORONARY ARTERY: Status: ACTIVE | Noted: 2017-09-25

## 2017-09-25 PROBLEM — I63.312 CEREBRAL INFARCTION DUE TO THROMBOSIS OF LEFT MIDDLE CEREBRAL ARTERY: Status: ACTIVE | Noted: 2017-09-25

## 2017-09-25 PROBLEM — I10 ESSENTIAL HYPERTENSION: Status: ACTIVE | Noted: 2017-09-25

## 2017-09-25 LAB
ABO + RH BLD: NORMAL
ALBUMIN SERPL BCP-MCNC: 3.1 G/DL
ALBUMIN SERPL BCP-MCNC: 3.3 G/DL
ALP SERPL-CCNC: 90 U/L
ALP SERPL-CCNC: 95 U/L
ALT SERPL W/O P-5'-P-CCNC: 21 U/L
ALT SERPL W/O P-5'-P-CCNC: 22 U/L
AMPHET+METHAMPHET UR QL: NEGATIVE
ANION GAP SERPL CALC-SCNC: 11 MMOL/L
ANION GAP SERPL CALC-SCNC: 14 MMOL/L
AST SERPL-CCNC: 24 U/L
AST SERPL-CCNC: 25 U/L
BACTERIA #/AREA URNS AUTO: NORMAL /HPF
BARBITURATES UR QL SCN>200 NG/ML: NEGATIVE
BASOPHILS # BLD AUTO: 0.03 K/UL
BASOPHILS # BLD AUTO: 0.03 K/UL
BASOPHILS NFR BLD: 0.4 %
BASOPHILS NFR BLD: 0.4 %
BENZODIAZ UR QL SCN>200 NG/ML: NEGATIVE
BILIRUB SERPL-MCNC: 0.3 MG/DL
BILIRUB SERPL-MCNC: 0.3 MG/DL
BILIRUB UR QL STRIP: NEGATIVE
BLD GP AB SCN CELLS X3 SERPL QL: NORMAL
BUN SERPL-MCNC: 11 MG/DL
BUN SERPL-MCNC: 11 MG/DL
BZE UR QL SCN: NORMAL
CALCIUM SERPL-MCNC: 8.5 MG/DL
CALCIUM SERPL-MCNC: 8.8 MG/DL
CANNABINOIDS UR QL SCN: NEGATIVE
CHLORIDE SERPL-SCNC: 105 MMOL/L
CHLORIDE SERPL-SCNC: 107 MMOL/L
CHOLEST SERPL-MCNC: 230 MG/DL
CHOLEST SERPL-MCNC: 235 MG/DL
CHOLEST/HDLC SERPL: 6.2 {RATIO}
CHOLEST/HDLC SERPL: 6.4 {RATIO}
CLARITY UR REFRACT.AUTO: CLEAR
CO2 SERPL-SCNC: 18 MMOL/L
CO2 SERPL-SCNC: 21 MMOL/L
COLOR UR AUTO: YELLOW
CREAT SERPL-MCNC: 0.8 MG/DL
CREAT SERPL-MCNC: 0.9 MG/DL
CREAT UR-MCNC: 54 MG/DL
DIFFERENTIAL METHOD: ABNORMAL
DIFFERENTIAL METHOD: ABNORMAL
EOSINOPHIL # BLD AUTO: 0.2 K/UL
EOSINOPHIL # BLD AUTO: 0.2 K/UL
EOSINOPHIL NFR BLD: 2.7 %
EOSINOPHIL NFR BLD: 2.8 %
ERYTHROCYTE [DISTWIDTH] IN BLOOD BY AUTOMATED COUNT: 12.8 %
ERYTHROCYTE [DISTWIDTH] IN BLOOD BY AUTOMATED COUNT: 13 %
EST. GFR  (AFRICAN AMERICAN): >60 ML/MIN/1.73 M^2
EST. GFR  (AFRICAN AMERICAN): >60 ML/MIN/1.73 M^2
EST. GFR  (NON AFRICAN AMERICAN): >60 ML/MIN/1.73 M^2
EST. GFR  (NON AFRICAN AMERICAN): >60 ML/MIN/1.73 M^2
ESTIMATED AVG GLUCOSE: 103 MG/DL
ETHANOL SERPL-MCNC: 91 MG/DL
GLUCOSE SERPL-MCNC: 102 MG/DL
GLUCOSE SERPL-MCNC: 176 MG/DL
GLUCOSE UR QL STRIP: NEGATIVE
HBA1C MFR BLD HPLC: 5.2 %
HCT VFR BLD AUTO: 39.5 %
HCT VFR BLD AUTO: 41.6 %
HDLC SERPL-MCNC: 36 MG/DL
HDLC SERPL-MCNC: 38 MG/DL
HDLC SERPL: 15.7 %
HDLC SERPL: 16.2 %
HGB BLD-MCNC: 14.4 G/DL
HGB BLD-MCNC: 14.5 G/DL
HGB UR QL STRIP: ABNORMAL
INR PPP: 0.9
KETONES UR QL STRIP: NEGATIVE
LDLC SERPL CALC-MCNC: 126.4 MG/DL
LDLC SERPL CALC-MCNC: 133.8 MG/DL
LEUKOCYTE ESTERASE UR QL STRIP: NEGATIVE
LYMPHOCYTES # BLD AUTO: 2.9 K/UL
LYMPHOCYTES # BLD AUTO: 3.2 K/UL
LYMPHOCYTES NFR BLD: 41.7 %
LYMPHOCYTES NFR BLD: 48.5 %
MAGNESIUM SERPL-MCNC: 1.8 MG/DL
MCH RBC QN AUTO: 32.2 PG
MCH RBC QN AUTO: 33.9 PG
MCHC RBC AUTO-ENTMCNC: 34.9 G/DL
MCHC RBC AUTO-ENTMCNC: 36.5 G/DL
MCV RBC AUTO: 92 FL
MCV RBC AUTO: 93 FL
METHADONE UR QL SCN>300 NG/ML: NEGATIVE
MICROSCOPIC COMMENT: NORMAL
MONOCYTES # BLD AUTO: 0.5 K/UL
MONOCYTES # BLD AUTO: 0.6 K/UL
MONOCYTES NFR BLD: 7 %
MONOCYTES NFR BLD: 8.5 %
NEUTROPHILS # BLD AUTO: 2.8 K/UL
NEUTROPHILS # BLD AUTO: 3.3 K/UL
NEUTROPHILS NFR BLD: 41.3 %
NEUTROPHILS NFR BLD: 46.5 %
NITRITE UR QL STRIP: NEGATIVE
NONHDLC SERPL-MCNC: 194 MG/DL
NONHDLC SERPL-MCNC: 197 MG/DL
OPIATES UR QL SCN: NORMAL
PCP UR QL SCN>25 NG/ML: NEGATIVE
PH UR STRIP: 5 [PH] (ref 5–8)
PHOSPHATE SERPL-MCNC: 3.7 MG/DL
PLATELET # BLD AUTO: 191 K/UL
PLATELET # BLD AUTO: 209 K/UL
PMV BLD AUTO: 10.4 FL
PMV BLD AUTO: 10.7 FL
POC PTINR: 1.1 (ref 0.9–1.2)
POC PTWBT: 13.1 SEC (ref 9.7–14.3)
POTASSIUM SERPL-SCNC: 3.3 MMOL/L
POTASSIUM SERPL-SCNC: 4.1 MMOL/L
PROT SERPL-MCNC: 6.9 G/DL
PROT SERPL-MCNC: 7.4 G/DL
PROT UR QL STRIP: NEGATIVE
PROTHROMBIN TIME: 10.1 SEC
RBC # BLD AUTO: 4.25 M/UL
RBC # BLD AUTO: 4.5 M/UL
RBC #/AREA URNS AUTO: 0 /HPF (ref 0–4)
SAMPLE: NORMAL
SODIUM SERPL-SCNC: 137 MMOL/L
SODIUM SERPL-SCNC: 139 MMOL/L
SP GR UR STRIP: >=1.03 (ref 1–1.03)
TOXICOLOGY INFORMATION: NORMAL
TRIGL SERPL-MCNC: 316 MG/DL
TRIGL SERPL-MCNC: 338 MG/DL
TSH SERPL DL<=0.005 MIU/L-ACNC: 2.39 UIU/ML
TSH SERPL DL<=0.005 MIU/L-ACNC: 2.55 UIU/ML
URATE CRY UR QL COMP ASSIST: NORMAL
URN SPEC COLLECT METH UR: ABNORMAL
UROBILINOGEN UR STRIP-ACNC: NEGATIVE EU/DL
WBC # BLD AUTO: 6.68 K/UL
WBC # BLD AUTO: 7.03 K/UL
WBC #/AREA URNS AUTO: 1 /HPF (ref 0–5)

## 2017-09-25 PROCEDURE — 80053 COMPREHEN METABOLIC PANEL: CPT | Mod: 91

## 2017-09-25 PROCEDURE — 25000003 PHARM REV CODE 250: Performed by: STUDENT IN AN ORGANIZED HEALTH CARE EDUCATION/TRAINING PROGRAM

## 2017-09-25 PROCEDURE — A4216 STERILE WATER/SALINE, 10 ML: HCPCS | Performed by: NURSE PRACTITIONER

## 2017-09-25 PROCEDURE — 92610 EVALUATE SWALLOWING FUNCTION: CPT

## 2017-09-25 PROCEDURE — 86900 BLOOD TYPING SEROLOGIC ABO: CPT

## 2017-09-25 PROCEDURE — 83735 ASSAY OF MAGNESIUM: CPT

## 2017-09-25 PROCEDURE — G8996 SWALLOW CURRENT STATUS: HCPCS | Mod: CH

## 2017-09-25 PROCEDURE — 83036 HEMOGLOBIN GLYCOSYLATED A1C: CPT

## 2017-09-25 PROCEDURE — 84443 ASSAY THYROID STIM HORMONE: CPT | Mod: 91

## 2017-09-25 PROCEDURE — 97165 OT EVAL LOW COMPLEX 30 MIN: CPT

## 2017-09-25 PROCEDURE — 20000000 HC ICU ROOM

## 2017-09-25 PROCEDURE — G8989 SELF CARE D/C STATUS: HCPCS | Mod: CH

## 2017-09-25 PROCEDURE — 85610 PROTHROMBIN TIME: CPT

## 2017-09-25 PROCEDURE — 81001 URINALYSIS AUTO W/SCOPE: CPT

## 2017-09-25 PROCEDURE — G8988 SELF CARE GOAL STATUS: HCPCS | Mod: CH

## 2017-09-25 PROCEDURE — 80061 LIPID PANEL: CPT | Mod: 91

## 2017-09-25 PROCEDURE — 86850 RBC ANTIBODY SCREEN: CPT

## 2017-09-25 PROCEDURE — 3E03317 INTRODUCTION OF OTHER THROMBOLYTIC INTO PERIPHERAL VEIN, PERCUTANEOUS APPROACH: ICD-10-PCS | Performed by: PSYCHIATRY & NEUROLOGY

## 2017-09-25 PROCEDURE — 97530 THERAPEUTIC ACTIVITIES: CPT

## 2017-09-25 PROCEDURE — 25500020 PHARM REV CODE 255: Performed by: EMERGENCY MEDICINE

## 2017-09-25 PROCEDURE — 97161 PT EVAL LOW COMPLEX 20 MIN: CPT

## 2017-09-25 PROCEDURE — S4991 NICOTINE PATCH NONLEGEND: HCPCS | Performed by: STUDENT IN AN ORGANIZED HEALTH CARE EDUCATION/TRAINING PROGRAM

## 2017-09-25 PROCEDURE — G8997 SWALLOW GOAL STATUS: HCPCS | Mod: CH

## 2017-09-25 PROCEDURE — 25000003 PHARM REV CODE 250: Performed by: NURSE PRACTITIONER

## 2017-09-25 PROCEDURE — 80320 DRUG SCREEN QUANTALCOHOLS: CPT

## 2017-09-25 PROCEDURE — 84100 ASSAY OF PHOSPHORUS: CPT

## 2017-09-25 PROCEDURE — 97802 MEDICAL NUTRITION INDIV IN: CPT

## 2017-09-25 PROCEDURE — G8987 SELF CARE CURRENT STATUS: HCPCS | Mod: CH

## 2017-09-25 PROCEDURE — 85025 COMPLETE CBC W/AUTO DIFF WBC: CPT | Mod: 91

## 2017-09-25 PROCEDURE — 99233 SBSQ HOSP IP/OBS HIGH 50: CPT | Mod: ,,, | Performed by: NURSE PRACTITIONER

## 2017-09-25 PROCEDURE — 99223 1ST HOSP IP/OBS HIGH 75: CPT | Mod: ,,, | Performed by: PSYCHIATRY & NEUROLOGY

## 2017-09-25 PROCEDURE — 80053 COMPREHEN METABOLIC PANEL: CPT

## 2017-09-25 PROCEDURE — 80061 LIPID PANEL: CPT

## 2017-09-25 PROCEDURE — 84443 ASSAY THYROID STIM HORMONE: CPT

## 2017-09-25 PROCEDURE — 63600175 PHARM REV CODE 636 W HCPCS: Performed by: NURSE PRACTITIONER

## 2017-09-25 PROCEDURE — 25000003 PHARM REV CODE 250: Performed by: PHYSICIAN ASSISTANT

## 2017-09-25 PROCEDURE — 80307 DRUG TEST PRSMV CHEM ANLYZR: CPT

## 2017-09-25 RX ORDER — METOPROLOL TARTRATE 25 MG/1
12.5 TABLET ORAL 2 TIMES DAILY
Status: DISCONTINUED | OUTPATIENT
Start: 2017-09-25 | End: 2017-09-26

## 2017-09-25 RX ORDER — ONDANSETRON 2 MG/ML
4 INJECTION INTRAMUSCULAR; INTRAVENOUS EVERY 12 HOURS PRN
Status: DISCONTINUED | OUTPATIENT
Start: 2017-09-25 | End: 2017-09-26 | Stop reason: HOSPADM

## 2017-09-25 RX ORDER — ATORVASTATIN CALCIUM 20 MG/1
40 TABLET, FILM COATED ORAL DAILY
Status: DISCONTINUED | OUTPATIENT
Start: 2017-09-25 | End: 2017-09-26 | Stop reason: HOSPADM

## 2017-09-25 RX ORDER — IBUPROFEN 200 MG
1 TABLET ORAL DAILY
Status: DISCONTINUED | OUTPATIENT
Start: 2017-09-25 | End: 2017-09-25

## 2017-09-25 RX ORDER — SERTRALINE HYDROCHLORIDE 25 MG/1
25 TABLET, FILM COATED ORAL DAILY
Status: DISCONTINUED | OUTPATIENT
Start: 2017-09-25 | End: 2017-09-26 | Stop reason: HOSPADM

## 2017-09-25 RX ORDER — IBUPROFEN 200 MG
1 TABLET ORAL DAILY
Status: DISCONTINUED | OUTPATIENT
Start: 2017-09-25 | End: 2017-09-26 | Stop reason: HOSPADM

## 2017-09-25 RX ORDER — AMOXICILLIN 250 MG
1 CAPSULE ORAL DAILY
Status: DISCONTINUED | OUTPATIENT
Start: 2017-09-25 | End: 2017-09-26 | Stop reason: HOSPADM

## 2017-09-25 RX ORDER — SODIUM CHLORIDE 0.9 % (FLUSH) 0.9 %
3 SYRINGE (ML) INJECTION EVERY 8 HOURS
Status: DISCONTINUED | OUTPATIENT
Start: 2017-09-25 | End: 2017-09-26 | Stop reason: HOSPADM

## 2017-09-25 RX ORDER — LISINOPRIL 5 MG/1
5 TABLET ORAL DAILY
Status: DISCONTINUED | OUTPATIENT
Start: 2017-09-25 | End: 2017-09-26 | Stop reason: HOSPADM

## 2017-09-25 RX ADMIN — LISINOPRIL 5 MG: 5 TABLET ORAL at 03:09

## 2017-09-25 RX ADMIN — METOPROLOL TARTRATE 12.5 MG: 25 TABLET, FILM COATED ORAL at 03:09

## 2017-09-25 RX ADMIN — SODIUM CHLORIDE, PRESERVATIVE FREE 3 ML: 5 INJECTION INTRAVENOUS at 10:09

## 2017-09-25 RX ADMIN — IOHEXOL 100 ML: 350 INJECTION, SOLUTION INTRAVENOUS at 12:09

## 2017-09-25 RX ADMIN — STANDARDIZED SENNA CONCENTRATE AND DOCUSATE SODIUM 1 TABLET: 8.6; 5 TABLET, FILM COATED ORAL at 03:09

## 2017-09-25 RX ADMIN — METOPROLOL TARTRATE 12.5 MG: 25 TABLET, FILM COATED ORAL at 08:09

## 2017-09-25 RX ADMIN — ATORVASTATIN CALCIUM 40 MG: 20 TABLET, FILM COATED ORAL at 03:09

## 2017-09-25 RX ADMIN — NICOTINE 1 PATCH: 21 PATCH, EXTENDED RELEASE TRANSDERMAL at 03:09

## 2017-09-25 RX ADMIN — SODIUM CHLORIDE, PRESERVATIVE FREE 3 ML: 5 INJECTION INTRAVENOUS at 03:09

## 2017-09-25 RX ADMIN — SERTRALINE HYDROCHLORIDE 25 MG: 25 TABLET ORAL at 03:09

## 2017-09-25 NOTE — PT/OT/SLP EVAL
Physical Therapy  Evaluation / Discharge Summary    Mina Jo   MRN: [id number]   Admitting Diagnosis: Cerebral infarction due to thrombosis of left middle cerebral artery    PT Received On: 09/25/17  PT Start Time: 1116     PT Stop Time: 1125    PT Total Time (min): 9 min       Billable Minutes:  Evaluation 9    Diagnosis: Cerebral infarction due to thrombosis of left middle cerebral artery  S/p tPA    Past Medical History:   Diagnosis Date    Coronary artery disease       Past Surgical History:   Procedure Laterality Date    BYPASS GRAFT      triple bypass 2009    CARDIAC SURGERY      triple bypass x 2    ROTATOR CUFF REPAIR Right        Referring physician: Georges De La Cruz NP  Date referred to PT: 9/24/17    General Precautions: Standard, fall  Orthopedic Precautions: N/A   Braces: N/A       Patient History:  Pt states living in [place], [place] in a 1st floor apartment with no THALIA, alone. Pt states being independent with all ADLs and functional mobility PTA with no DME.   Equipment used at home:  No Assistive Device.  Equipment owned but not using:  No Assistive Device.  Activity level: active.   Work: yes, .   Drive: yes.   Cooking/Cleaning/Managing Home: yes.   Hand dominance:right.   Hobbies: fishing.    Previous Level of Function:  Ambulation Skills: independent  Transfer Skills: independent  ADL Skills: independent  Work/Leisure Activity: independent    Subjective:  Communicated with RN prior to session.  Pt agrees to participate in session  Chief Complaint: Right arm weakness--resolved  Patient goals: to return home    Pain/Comfort  Pain Rating 1: 0/10  Pain Rating Post-Intervention 1: 0/10      Objective:   Patient found with: blood pressure cuff, telemetry, pulse ox (continuous)     Cognitive Exam:  Oriented to: Person, Place, Time and Situation    Follows Commands/attention: Follows multistep  commands  Communication: clear/fluent  Safety awareness/insight to disability: intact    Physical  Exam:  Postural examination/scapula alignment: Rounded shoulder    Skin integrity: Visible skin intact  Edema: None noted    Sensation:   Intact  light/touch face, BUE and BLE    Lower Extremity Range of Motion:  Right Lower Extremity: WFL  Left Lower Extremity: WFL    Lower Extremity Strength:  Right Lower Extremity: WFL  Left Lower Extremity: WFL     Fine motor coordination:  Intact  Left hand, finger to nose, Right hand, finger to nose, Left hand thumb/finger opposition skills and Right hand thumb/finger opposition skills    Functional Mobility:  Pt found walking out of restroom with RN.  Bed Mobility:    Sit <> Supine:  [place]    Transfers:    Sit <> Stand: x 1 trials from EOB with Independent with No Assistive Device    Stand <> Sit: x 1trials to EOB with Independent with No Assistive Device     Gait:   Distance Ambulated: 25ft within room, limited by tPA window    Assistance level: [place]   Assistive Device used:  No Assistive Device   Gait Pattern: swing-through gait   Gait Deviation(s): no deviations noted    Tinetti Gait and Balance Assessment     Assistive Device  Normally Used: No  Assistive Device During Testing: No       Balance Tests:  1. Sitting Balance:          Steady; safe = 1  2. Arises :        Able, without using arms = 2  3. Attempts to arise :        Able to arise, 1 attempt = 2  4. Immediate standing Balance :        Steady without walker or other support = 2  5. Standing balance:         Narrow stance without support = 2  6. Nudged :        Steady = 2  7. Eyes closed:         Steady = 1  8. Turning 360 degrees:         Continuous = 1 and Steady = 1  9.Sitting Down :         Safe, smooth motion = 2       Balance Score  16/16    Gait Tests:  10. Initiation of Gait:         No hesitancy = 1  11. Step length and height :        Right swing foot:  Passes left stance foot = 1 and Completely clears floor with step = 1 and Left swing foot:  Passes left stance foot = 1 and Completely  clears floor with step = 1  12. Step symmetry         Right & Left step length appear equal = 1  13. Step continuity :        Steps appear continuous = 1  14. Path :        Straight without walking aid = 2  15. Trunk :          No sway, no flexion, no use of arms, and no use of walking aid = 2  16. Walking stance width          Heels amost touching while walking = 1       Gait Score:    12/12     Balance + Gait Score: 28 /28       25-28= Low fall risk  19-24 Medium fall risk  < 19= High fall risk    Therapeutic Activities and Exercises:  Education:  Patient provided with daily orientation and goals of this PT session. Patient agreed to participate in session.Patient aware of patient's deficits and therapy progression. They were educated to transfer with RN/PCT only; supervision of 1 person. Time provided for therapeutic counseling and discussion of health disposition. All questions answered to patient's satisfaction, within scope of PT practice; voiced no other concerns. White board updated in patient's room, RN notified of session.    AM-PAC 6 CLICK MOBILITY  How much help from another person does this patient currently need?   1 = Unable, Total/Dependent Assistance  2 = A lot, Maximum/Moderate Assistance  3 = A little, Minimum/Contact Guard/Supervision  4 = None, Modified [place]/Independent    Turning over in bed (including adjusting bedclothes, sheets and blankets)?: 4  Sitting down on and standing up from a chair with arms (e.g., wheelchair, bedside commode, etc.): 4  Moving from lying on back to sitting on the side of the bed?: 4  Moving to and from a bed to a chair (including a wheelchair)?: 4  Need to walk in hospital room?: 4  Climbing 3-5 steps with a railing?: 4  Total Score: 24     AM-PAC Raw Score CMS G-Code Modifier Level of Impairment Assistance   6 % Total / Unable   7 - 9 CM 80 - 100% Maximal Assist   10 - 14 CL 60 - 80% Moderate Assist   15 - 19 CK 40 - 60% Moderate Assist   20 - 22 CJ  20 - 40% Minimal Assist   23 CI 1-20% SBA / CGA   24 CH 0% Independent/ Mod I     Patient left supine with all lines intact, call button in reach and RN notified.    Assessment:   Mina Jo is a 57 y.o. male with a medical diagnosis of Cerebral infarction due to thrombosis of left middle cerebral artery, s/p tPA. Pt presents with no deficits. Pt is safe and independent with all mobility. Pt no longer requires PT services.    Rehab identified problem list/impairments: Rehab identified problem list/impairments:  (no impairments identified)    Rehab potential is good.    Activity tolerance: Good    Discharge recommendations: Discharge Facility/Level Of Care Needs: home     Barriers to discharge: Barriers to Discharge: None    Equipment recommendations: Equipment Needed After Discharge: none     GOALS:   No goals identified at eval.    PLAN:    Pt is safe and independent with all mobility. Pt no longer requires PT services.  Plan of Care reviewed with: patient    Personal factors/comorbidities: N/A. Due to the listed comorbidities the patient cannot fully participate in PT POC.  Body systems elements affected: N/A  Clinical Presentation: stable  Functional Outcome Tools: AMPAC, ROM, MMT, Tinetti  Evaluation Complexity Level: Low    Amara Hilario PT, DPT  9/25/2017  Pager: [phone]

## 2017-09-25 NOTE — ED PROVIDER NOTES
Encounter Date: 9/24/2017       History     Chief Complaint   Patient presents with    Cerebrovascular Accident      Patient was at home when he all of a sudden and worsening dysarthria 30 minutes PTA to ED.      HPI  Review of patient's allergies indicates:  No Known Allergies  Past Medical History:   Diagnosis Date    Coronary artery disease      Past Surgical History:   Procedure Laterality Date    BYPASS GRAFT      triple bypass 2009    CARDIAC SURGERY      triple bypass x 2    ROTATOR CUFF REPAIR Right      No family history on file.  Social History   Substance Use Topics    Smoking status: Current Every Day Smoker     Packs/day: 1.50     Years: 20.00     Types: Cigarettes    Smokeless tobacco: Never Used    Alcohol use Yes      Comment: beer daily     Review of Systems    Physical Exam     Initial Vitals [09/24/17 2329]   BP Pulse Resp Temp SpO2   (!) 146/74 84 18 -- 98 %      MAP       98         Physical Exam    ED Course   Procedures  Labs Reviewed   CBC W/ AUTO DIFFERENTIAL   COMPREHENSIVE METABOLIC PANEL   PROTIME-INR   TSH   LIPID PANEL   ALCOHOL,MEDICAL (ETHANOL)   DRUG SCREEN PANEL, URINE EMERGENCY   POCT GLUCOSE                               ED Course      Clinical Impression:   {Add your Clinical Impression here. If you haven't documented one yet, please pend the note, finalize a Clinical Impression, and refresh your note before signing.:[id number]}

## 2017-09-25 NOTE — PLAN OF CARE
Problem: SLP Goal  Goal: SLP Goal  Outcome: Ongoing (interventions implemented as appropriate)  Regular diet with thin liquids recommended.    Dionne Kramer MA/SINDHU-SLP  Speech Language Pathologist  Pager [phone]  9/25/2017

## 2017-09-25 NOTE — ASSESSMENT & PLAN NOTE
-Vascular NEurology consult  -NIH 6  -Q1hr NEuro/SBP <180  -CTA multiphase, no thrombectomy, M3 occlusion  -May need Vascualr Surgery, eval carotid stenosis  -PT/OT/SLP

## 2017-09-25 NOTE — PLAN OF CARE
Problem: Occupational Therapy Goal  Goal: Occupational Therapy Goal  Goals not set 2* no further OT needed.    OT evaluation completed.  CLAUDIA Dawn  9/25/2017

## 2017-09-25 NOTE — H&P
"[facility]  Neurocritical Care  History & Physical    Admit Date: 9/24/2017  Service Date: 09/25/2017  Length of Stay: 0    Subjective:     Chief Complaint: Cerebral infarction due to thrombosis of left middle cerebral artery    History of Present Illness: Mr Jo is a 58 yo male with a PMH oh HTN, CAD, CABG, and tobacco abuse who present to [facility] s/p tPA. Pt called EMS after having slurred speech and being unable to "work remote" approximately 30mins pta. Upon arrival to hospital, pt was dysarthric. After evaluation in the ED he was given tPA finished 0058 . He is being admitted to [facility] for close neurologic monitoring and a higher level of care.      Past Medical History:   Diagnosis Date    Coronary artery disease      Past Surgical History:   Procedure Laterality Date    BYPASS GRAFT      triple bypass 2009    CARDIAC SURGERY      triple bypass x 2    ROTATOR CUFF REPAIR Right       No current facility-administered medications on file prior to encounter.      Current Outpatient Prescriptions on File Prior to Encounter   Medication Sig Dispense Refill    aspirin (ECOTRIN) 81 MG EC tablet Take 1 tablet (81 mg total) by mouth once daily. 30 tablet 11    atorvastatin (LIPITOR) 40 MG tablet Take 1 tablet (40 mg total) by mouth once daily. 30 tablet 11    lisinopril (PRINIVIL,ZESTRIL) 5 MG tablet Take 1 tablet (5 mg total) by mouth once daily. 30 tablet 2    metoprolol tartrate (LOPRESSOR) 25 MG tablet Take 0.5 tablets (12.5 mg total) by mouth 2 (two) times daily. 30 tablet 0    sertraline (ZOLOFT) 25 MG tablet Take 1 tablet (25 mg total) by mouth once daily. 30 tablet 0      Allergies: Review of patient's allergies indicates no known allergies.    History reviewed. No pertinent family history.  Social History   Substance Use Topics    Smoking status: Current Every Day Smoker     Packs/day: 1.50     Years: 20.00     Types: Cigarettes    Smokeless tobacco: Never Used    Alcohol use Yes "      Comment: beer daily     Review of Systems   Reason unable to perform ROS: dysarthria.     Objective:     Vitals:  Pulse: 93 (09/25/17 0112)  Resp: 17 (09/25/17 0112)  BP: 127/60 (09/25/17 0112)  SpO2: 95 % (09/25/17 0112)    Pulse:  [] 93  Resp:  [17-30] 17  SpO2:  [95 %-98 %] 95 %  BP: (127-156)/(60-97) 127/60              09/24 0701 - 09/25 0700  In: 67 [I.V.:67]  Out: -     Physical Exam   Cardiovascular: Normal rate, regular rhythm, normal heart sounds and intact distal pulses.    Pulmonary/Chest: Effort normal and breath sounds normal.   Abdominal: Soft. Bowel sounds are normal.   Neurological:   E4 V5 M6  AAO x person, place, president  MATEOLA, EOMI,   Right facial weakness/ptosis  Dysarthria  receptive Aphasia, difficult with complex commands  RUE 5/5  LUE 5/5  RLE 5/5  LLE 5/5   Skin: Skin is warm and dry.         Today I personally reviewed pertinent medications, lines/drains/airways, imaging, cardiology, lab results, microbiology results,      Assessment/Plan:     Neuro   CVA (cerebral vascular accident)    -Vascular NEurology consult  -NIH 6  -Q1hr NEuro/SBP <180  -CTA multiphase, no thrombectomy, M3 occlusion  -May need Vascualr Surgery, eval carotid stenosis  -PT/OT/SLP          Cardiac/Vascular   Essential hypertension    -SBP <180  EKG/Echo pending        Coronary artery disease involving native coronary artery    S/p CABG, 2009  Visitor reports noncompliance with medication/diet            Prophylaxis:  Venous Thromboembolism: mechanical  Stress Ulcer: None  Ventilator Pneumonia: not applicable     Activity Orders          None        Full Code  Level 3  I spent >35 minutes reviewing patient records, examining, and counseling the patient with greater than 50% of the time spent with direct patient care and coordination.     Michael Petty NP  Neurocritical Care  [facility]

## 2017-09-25 NOTE — ED TRIAGE NOTES
"Pt called EMS after having slurred speech and being unable to "work remote" approximately 30mins pta. Upon arrival to hospital pt had slurred speech and was dysarthric.    "

## 2017-09-25 NOTE — PLAN OF CARE
[id number] - [place], [place] - [address]  [address]  [place] [address]  Phone: [phone] Fax: [phone]    This CM spoke with patient at bedside.       09/25/17 1348   Discharge Assessment   Assessment Type Discharge Planning Assessment   Confirmed/corrected address and phone number on facesheet? Yes   Assessment information obtained from? Patient   Expected Length of Stay (days) 2   Communicated expected length of stay with patient/caregiver yes   Prior to hospitilization cognitive status: Alert/Oriented   Prior to hospitalization functional status: Independent   Current cognitive status: Alert/Oriented   Current Functional Status: Independent   Lives With significant other   Is patient able to care for self after discharge? Yes   Who are your caregiver(s) and their phone number(s)? (sister Andree Oshea [phone])   Patient's perception of discharge disposition home or selfcare   Readmission Within The Last 30 Days no previous admission in last 30 days   Patient currently being followed by outpatient case management? No   Patient currently receives any other outside agency services? No   Equipment Currently Used at Home none   Do you have any problems affording any of your prescribed medications? No   Is the patient taking medications as prescribed? yes   Does the patient have transportation home? Yes   Transportation Available family or friend will provide;car   Does the patient receive services at the [facility]? No   Discharge Plan A Home   Discharge Plan B Home with family   Patient/Family In Agreement With Plan yes       Madiha Arvizu CM  [id number]

## 2017-09-25 NOTE — SUBJECTIVE & OBJECTIVE
Past Medical History:   Diagnosis Date    Coronary artery disease      Past Surgical History:   Procedure Laterality Date    BYPASS GRAFT      triple bypass 2009    CARDIAC SURGERY      triple bypass x 2    ROTATOR CUFF REPAIR Right       No current facility-administered medications on file prior to encounter.      Current Outpatient Prescriptions on File Prior to Encounter   Medication Sig Dispense Refill    aspirin (ECOTRIN) 81 MG EC tablet Take 1 tablet (81 mg total) by mouth once daily. 30 tablet 11    atorvastatin (LIPITOR) 40 MG tablet Take 1 tablet (40 mg total) by mouth once daily. 30 tablet 11    lisinopril (PRINIVIL,ZESTRIL) 5 MG tablet Take 1 tablet (5 mg total) by mouth once daily. 30 tablet 2    metoprolol tartrate (LOPRESSOR) 25 MG tablet Take 0.5 tablets (12.5 mg total) by mouth 2 (two) times daily. 30 tablet 0    sertraline (ZOLOFT) 25 MG tablet Take 1 tablet (25 mg total) by mouth once daily. 30 tablet 0      Allergies: Review of patient's allergies indicates no known allergies.    History reviewed. No pertinent family history.  Social History   Substance Use Topics    Smoking status: Current Every Day Smoker     Packs/day: 1.50     Years: 20.00     Types: Cigarettes    Smokeless tobacco: Never Used    Alcohol use Yes      Comment: beer daily     Review of Systems   Reason unable to perform ROS: dysarthria.     Objective:     Vitals:  Pulse: 93 (09/25/17 0112)  Resp: 17 (09/25/17 0112)  BP: 127/60 (09/25/17 0112)  SpO2: 95 % (09/25/17 0112)    Pulse:  [] 93  Resp:  [17-30] 17  SpO2:  [95 %-98 %] 95 %  BP: (127-156)/(60-97) 127/60              09/24 0701 - 09/25 0700  In: 67 [I.V.:67]  Out: -     Physical Exam   Cardiovascular: Normal rate, regular rhythm, normal heart sounds and intact distal pulses.    Pulmonary/Chest: Effort normal and breath sounds normal.   Abdominal: Soft. Bowel sounds are normal.   Neurological:   E4 V5 M6  AAO x person, place, president  DUTCH, EOMI,    Right facial weakness/ptosis  Dysarthria  receptive Aphasia, difficult with complex commands  RUE 5/5  LUE 5/5  RLE 5/5  LLE 5/5   Skin: Skin is warm and dry.         Today I personally reviewed pertinent medications, lines/drains/airways, imaging, cardiology, lab results, microbiology results,

## 2017-09-25 NOTE — PT/OT/SLP EVAL
"Occupational Therapy  Evaluation/Treatment/ Discharge Summary    Mina oJ   MRN: [id number]   Admitting Diagnosis: Cerebral infarction due to thrombosis of left middle cerebral artery    OT Date of Treatment: 09/25/17   OT Start Time: 0550  OT Stop Time: 0620  OT Total Time (min): 30 min    Billable Minutes:  Evaluation 20  Therapeutic Activity 10    Diagnosis: Cerebral infarction due to thrombosis of left middle cerebral artery     Past Medical History:   Diagnosis Date    Coronary artery disease       Past Surgical History:   Procedure Laterality Date    BYPASS GRAFT      triple bypass 2009    CARDIAC SURGERY      triple bypass x 2    ROTATOR CUFF REPAIR Right        Referring physician: Jono  Date referred to OT: 9/25  General Precautions: Standard, aspiration, fall, NPO  Orthopedic Precautions: N/A  Braces: N/A    Do you have any cultural, spiritual, Jew conflicts, given your current situation?: Rastafari     Patient History:  Prior level of function:   Patient resides in [facility] in first floor apt with no steps to enter.  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  PTA patient independent with ADLs including driving.  Patient is right handed.  Works:  .  Hobbies: Fishing, hunting, drinking beer and smoking.  Roles/Responsibilities:  brother, father of 2 ages 20 and 24, .      Subjective:  Communicated with nurse prior to session.  Patient: "I couldn't change the channels on the remote.  Then my cigarette kept falling.  Then it got worse and I couldn't talk."  Sister:  "His speech came back a couple of hours ago."  "He has 13 siblings."  Pain/Comfort  Pain Rating 1: 0/10  Pain Rating Post-Intervention 1: 0/10    Objective:  Patient found with: SCD, telemetry, peripheral IV, blood pressure cuff, pulse ox (continuous)  Brother and sister present.    Cognitive Exam:  Oriented to: Person, Place, Time and Situation  Follows Commands/attention: Follows one-step " commands  Communication: clear/fluent  Memory:  No Deficits noted  Safety awareness/insight to disability: intact  Coping skills/emotional control: Appropriate to situation    Visual/perceptual:  Intact; wears glasses    Physical Exam:  Postural examination/scapula alignment: Rounded shoulder  Skin integrity: Visible skin intact  Edema: None noted     Sensation:   Intact    Upper Extremity Range of Motion:  Right Upper Extremity: WNL  Left Upper Extremity: WNL    Upper Extremity Strength:  Right Upper Extremity: WNL  Left Upper Extremity: WNL    Fine motor coordination:   Intact    Gross motor coordination: WFL    Functional Mobility:  Bed Mobility:  Rolling/Turning to Left: Independent  Rolling/Turning Right: Independent  Scooting/Bridging: Independent  Supine to Sit: Independent  Sit to Supine: Independent    Transfers:  Sit <> Stand Assistance: Independent  Sit <> Stand Assistive Device: No Assistive Device  Bed <> Chair Technique: Stand Pivot  Bed <> Chair Transfer Assistance: Independent    Functional Ambulation: Independent    Activities of Daily Living:  UE Dressing Level of Assistance: Independent  LE Dressing Level of Assistance: Independent  Grooming Position: Standing  Grooming Level of Assistance: Independent      Additional Treatment:   Patient/ Family education provided for stroke warning signs, prevention guidelines and personal risk factors (smoking).  Patient/ Family verbalizing understanding via teach back method.   Patient education provided on role of OT and need for no further OT upon discharge.   Patient verbalizing understanding via teach back method.  Patient alert and oriented x 3; able to follow 4/4 one step commands.  Patient attentive and interactive throughout the session.  Patient able to identify 5/5 body parts.  Able to name 5/5 objects.  Able to sequence 7/7 days of the week and 12/12 months of the year.  Patient's functional status and disposition recommendation discussed with  "stroke team in daily rounds.  White board updated in patient's room.  OT asked if there were any other questions; patient/ family had no further questions.       AM-PAC 6 CLICK ADL  How much help from another person does this patient currently need?  1 = Unable, Total/Dependent Assistance  2 = A lot, Maximum/Moderate Assistance  3 = A little, Minimum/Contact Guard/Supervision  4 = None, Modified Maries/Independent    Putting on and taking off regular lower body clothing? : 4  Bathing (including washing, rinsing, drying)?: 4  Toileting, which includes using toilet, bedpan, or urinal? : 4  Putting on and taking off regular upper body clothing?: 4  Taking care of personal grooming such as brushing teeth?: 4  Eating meals?: 4  Total Score: 24    AM-PAC Raw Score CMS "G-Code Modifier Level of Impairment Assistance   6 % Total / Unable   7 - 9 CM 80 - 100% Maximal Assist   10-14 CL 60 - 80% Moderate Assist   15 - 19 CK 40 - 60% Moderate Assist   20 - 22 CJ 20 - 40% Minimal Assist   23 CI 1-20% SBA / CGA   24 CH 0% Independent/ Mod I       Patient left supine with all lines intact and call button in reach    Assessment:  Mina Jo is a 57 y.o. male with a medical diagnosis of Cerebral infarction due to thrombosis of left middle cerebral artery and presents without performance deficits; able to organize occupational performance in a timely and safe manner; demonstrating skills necessary to successfully and appropriately participate in everyday tasks and social situations.  No activity limitations noted. No further OT needed.     Pt evaluation falls under low complexity for evaluation coding due to performance deficits noted in 1-3 areas as stated above and 0 co-morbities affecting current functional status. Data obtained from problem focused assessments. No modifications or assistance was required for completion of evaluation. Only brief occupational profile and history review completed.    Rehab " identified problem list/impairments: Rehab identified problem list/impairments:  (none)    Rehab potential is good.    Activity tolerance: Good    Discharge recommendations: Discharge Facility/Level Of Care Needs: home     Barriers to discharge: Barriers to Discharge: None    Equipment recommendations: none     GOALS:    Occupational Therapy Goals        Problem: Occupational Therapy Goal    Goal Priority Disciplines Outcome Interventions   Occupational Therapy Goal     OT, PT/OT     Description:  Goals not set 2* no further OT needed.                      PLAN:  Discharge from OT services on acute.   Plan of Care reviewed with: patient, sibling    OT G-codes  Functional Assessment Tool Used: FIM  Score: 7  Functional Limitation: Self care  Self Care Current Status ():   Self Care Goal Status ():   Self Care Discharge Status (): CLAUDIA Alvarez  09/25/2017

## 2017-09-25 NOTE — SUBJECTIVE & OBJECTIVE
Past Medical History:   Diagnosis Date    Coronary artery disease      Past Surgical History:   Procedure Laterality Date    BYPASS GRAFT      triple bypass 2009    CARDIAC SURGERY      triple bypass x 2    ROTATOR CUFF REPAIR Right      History reviewed. No pertinent family history.  Social History   Substance Use Topics    Smoking status: Current Every Day Smoker     Packs/day: 1.50     Years: 20.00     Types: Cigarettes    Smokeless tobacco: Never Used    Alcohol use Yes      Comment: beer daily     Review of patient's allergies indicates:  No Known Allergies  Medications: I have reviewed the current medication administration record.      (Not in a hospital admission)    Review of Systems   Constitutional: Negative for chills and fever.   HENT: Negative for ear discharge and ear pain.    Eyes: Negative for pain and itching.   Respiratory: Negative for cough and shortness of breath.    Cardiovascular: Negative for chest pain and leg swelling.   Gastrointestinal: Negative for abdominal distention and abdominal pain.   Endocrine: Negative for polyphagia and polyuria.   Genitourinary: Negative for difficulty urinating and dysuria.   Musculoskeletal: Negative for arthralgias.   Neurological: Positive for speech difficulty and weakness.   Psychiatric/Behavioral: The patient is nervous/anxious.      Objective:     Vital Signs (Most Recent):  Pulse: 101 (09/24/17 2357)  Resp: 18 (09/24/17 2357)  BP: (!) 142/80 (09/24/17 2357)  SpO2: 95 % (09/24/17 2357)    Vital Signs Range (Last 24H):  Pulse:  []   Resp:  [18-30]   BP: (142-156)/(74-97)   SpO2:  [95 %-98 %]     Physical Exam   Constitutional: He is oriented to person, place, and time. He appears well-developed. He appears distressed.   HENT:   Head: Normocephalic and atraumatic.   Eyes: EOM are normal. Pupils are equal, round, and reactive to light.   Neck: Normal range of motion.   Cardiovascular: Normal rate.    Pulmonary/Chest: Effort normal.    Abdominal: Soft.   Musculoskeletal: Normal range of motion.   Neurological: He is alert and oriented to person, place, and time. GCS eye subscore is 4 - spontaneous. GCS verbal subscore is 5 - oriented. GCS motor subscore is 6 - obeys commands.   Aphasic, dysarthria facial droop   Skin: Skin is warm and dry. He is not diaphoretic.   Nursing note and vitals reviewed.      Neurological Exam:   LOC: alert and follows requests  Language: Expressive aphasia  Speech: Dysarthria  Orientation: Person, Place, Time  Memory: Recent memory intact, Remote memory intact, Age correct, Month correct  Visual Fields (CN II): Full  EOM (CN III, IV, VI): Full/intact  Oculocephalics: normal  Pupils (CN III, IV, VI): PERRL  Facial Sensation (CN V): Symmetric, Corneal reflex intact  Facial Movement (CN VII): lower weakness right lower  Hearing (CN VIII): intact bilaterally  Gag Reflex (CN IX, X): normal/symmetric  Shoulder/Neck (CN XI): SCM-Left: Normal ; SCM-Right: Normal ; Shoulder Shrug: Normal/Symetric  Tongue (CN XII): to midline  Reflexes: flexor plantar responses bilaterally  Motor*: Arm Left:  Normal (5/5), Leg Left:   Normal (5/5), Arm Right:   Normal (5/5), Leg Right:   Normal (5/5)  Cerebellar*: Not testable due to laying on strtecher  Sensation: intact to light touch, temperature and vibration  Tone: Arm-Left: normal; Leg-Left: normal; Arm-Right: normal; Leg-Right: normal       Stroke Team Times:   Date and Time Taken: 9/24/2017 11:45 PM  Last Known Normal Date and Time : 9/24/201723:00  Symptom Onset Date and Time:9/24/201723:00  Stroke Team Called Date and Time: 9/24/201723:35  Stroke Team Arrived Date and Time: 9/24/201723:40  CT Interpretation Time: 23:45  Intervention Time: 23:57 (TPA)  NIH Stroke Scale:  Interval: baseline (upon arrival/admit)  Level of Consciousness: 0 - alert  LOC Questions: 0 - answers both correctly  LOC Commands: 0 - performs both correctly  Best Gaze: 0 - normal  Visual: 0 - no visual  loss  Facial Palsy: 1 - minor  Motor Left Arm: 0 - no drift  Motor Right Arm: 0 - no drift  Motor Left Le - no drift  Motor Right Le - no drift  Limb Ataxia: 0 - absent  Sensory: 0 - normal  Best Language: 2 - severe aphasia  Dysarthria: 2 - near to unintelligible  Extinction and Inattention: 0 - no neglect  NIH Stroke Scale Total: 5  Sigurd Coma Scale:  Best Eye Response: 4 - spontaneous  Best Motor Response: 6 - obeys commands  Best Verbal Response: 5 - oriented  Sigurd Coma Scale Total: 15  Modified [place] Scale:   Timeline: Prior to symptoms onset  Modified Bogdan Score: 0 - no symptoms        Laboratory:  CMP: No results for input(s): GLUCOSE, CALCIUM, ALBUMIN, PROT, NA, K, CO2, CL, BUN, CREATININE, ALKPHOS, ALT, AST, BILITOT in the last 24 hours.  CBC:   Recent Labs  Lab 17  2345   HCT 44     Lipid Panel: No results for input(s): CHOL, LDLCALC, HDL, TRIG in the last 168 hours.  Coagulation: No results for input(s): INR, APTT in the last 168 hours.    Invalid input(s): PT  Platelet Aggregation Study: No results for input(s): PLTAGG, PLTAGINTERP, PLTAGREGLACO, ADPPLTAGGREG in the last 168 hours.  Hgb A1C: No results for input(s): HGBA1C in the last 168 hours.  TSH: No results for input(s): TSH in the last 168 hours.    Diagnostic Results:  Brain Imaging:   CT head w/o contrast 17 results:  No acute intracranial pathology.    Cardiac Evaluation:   EKG/Telemetry: 17 results:  Sinus tachycardia  Otherwise normal ECG  When compared with ECG of 2017 17:56,  Vent. rate has increased BY 46 BPM

## 2017-09-25 NOTE — PT/OT/SLP EVAL
"Speech Language Pathology  Evaluation    Mina Jo   MRN: [id number]   Admitting Diagnosis: Cerebral infarction due to thrombosis of left middle cerebral artery    Diet recommendations: Solid Diet Level: Regular  Liquid Diet Level: Thin     SLP Treatment Date: 09/25/17  Speech Start Time: 0835     Speech Stop Time: 0845     Speech Total (min): 10 min       TREATMENT BILLABLE MINUTES:  Eval Swallow and Oral Function 10    Diagnosis: Cerebral infarction due to thrombosis of left middle cerebral artery      Past Medical History:   Diagnosis Date    Coronary artery disease      Past Surgical History:   Procedure Laterality Date    BYPASS GRAFT      triple bypass 2009    CARDIAC SURGERY      triple bypass x 2    ROTATOR CUFF REPAIR Right        Has the patient been evaluated by SLP for swallowing? : Yes  Keep patient NPO?: No   General Precautions: Standard, fall          Social Hx: Patient lives with self    Prior diet: regular    Subjective:  "I would like coffee"  Pain/Comfort  Pain Rating 1: 0/10  Pain Rating Post-Intervention 1: 0/10    Objective:        Oral Musculature Evaluation  Oral Musculature: WNL  Dentition: present and adequate  Mucosal Quality: good  Mandibular Strength and Mobility: WNL  Oral Labial Strength and Mobility: WNL  Lingual Strength and Mobility: WNL  Velar Elevation: WNL  Buccal Strength and Mobility: WNL  Volitional Cough: strong  Volitional Swallow: no delay  Voice Prior to PO Intake: wnl     Bedside Swallow Eval:  Consistencies Assessed: Thin liquids 1 cup water, Puree 2 teaspoons pudding and Solids 1/2 cracker  Oral Phase: WNL  Pharyngeal Phase: no overt clinical  signs/symptoms of aspiration and no overt clinical signs/symptoms of pharyngeal dysphagia      Assessment:  Mina Jo is a 57 y.o. male with a medical diagnosis of Cerebral infarction due to thrombosis of left middle cerebral artery and presents with oral and pharyngeal phases of swallow wfl.      Do you have " any cultural, spiritual, Yazdanism conflicts, given your current situation?: no     Discharge recommendations: Discharge Facility/Level Of Care Needs:  (to be determined)     Goals:    SLP Goals        Problem: SLP Goal    Goal Priority Disciplines Outcome   SLP Goal     SLP Ongoing (interventions implemented as appropriate)   Description:  Speech Language Pathology Goals  Goals expected to be met by 10/2  1.  Pt. Will participate in speech language eval                         Plan:   Patient to be seen Therapy Frequency: 5 x/week   Plan of Care expires: 10/23/17  Plan of Care reviewed with: patient, sibling  SLP Follow-up?: Yes  SLP - Next Visit Date: 09/26/17           Dionne Kramer MA, CCC-SLP  09/25/2017

## 2017-09-25 NOTE — PLAN OF CARE
Problem: Patient Care Overview  Goal: Plan of Care Review  Outcome: Ongoing (interventions implemented as appropriate)  POC reviewed with pt at 0500. Pt verbalized understanding. Questions and concerns addressed. No acute events overnight. Pt progressing toward goals. Will continue to monitor. See flowsheets for full assessment and VS info.

## 2017-09-25 NOTE — ED PROVIDER NOTES
Encounter Date: 9/24/2017    SCRIBE #1 NOTE: I, Codey Moncada, am scribing for, and in the presence of,  Dr. Patel. I have scribed the entire note.       History     Chief Complaint   Patient presents with    Cerebrovascular Accident      Patient was at home when he became dysarthric at 2300 which has worsened in progression PTA to ED.      Time patient was seen by the provider: 11:41 PM      The patient is a 57 y.o. male with no history of stroke but with a cardiac history that presented to EMS after 30 minutes of symptoms. He had difficulty using his hand and then in front of EMS developed an appearing expressive aphasia. He was still able to follow commands and remains alert. His blood sugar is normal; sinus tachycardic on monitor.        The history is provided by medical records. The history is limited by the condition of the patient.     Review of patient's allergies indicates:  No Known Allergies  Past Medical History:   Diagnosis Date    Coronary artery disease      Past Surgical History:   Procedure Laterality Date    BYPASS GRAFT      triple bypass 2009    CARDIAC SURGERY      triple bypass x 2    ROTATOR CUFF REPAIR Right      History reviewed. No pertinent family history.  Social History   Substance Use Topics    Smoking status: Current Every Day Smoker     Packs/day: 1.50     Years: 20.00     Types: Cigarettes    Smokeless tobacco: Never Used    Alcohol use Yes      Comment: beer daily     Review of Systems   Unable to perform ROS: Acuity of condition       Physical Exam     Initial Vitals [09/24/17 2329]   BP Pulse Resp Temp SpO2   (!) 146/74 84 18 -- 98 %      MAP       98         Physical Exam    Nursing note and vitals reviewed.  HENT:   Head: Normocephalic and atraumatic.   Mouth/Throat: Oropharynx is clear and moist.   Eyes: Conjunctivae and EOM are normal. Pupils are equal, round, and reactive to light.   Neck: Neck supple.   Cardiovascular:   Borderline tachycardic   Pulmonary/Chest:  Breath sounds normal.   Abdominal: Soft.   Neurological: He is alert.   Expressive aphasia. Some questionable dysarthria. Follows commands. No apparent drift.   Skin: Skin is warm and dry.   Psychiatric: Thought content normal.         ED Course   Procedures  Labs Reviewed   CBC W/ AUTO DIFFERENTIAL - Abnormal; Notable for the following:        Result Value    RBC 4.50 (*)     MCH 32.2 (*)     Lymph% 48.5 (*)     All other components within normal limits   COMPREHENSIVE METABOLIC PANEL - Abnormal; Notable for the following:     Potassium 3.3 (*)     CO2 18 (*)     Glucose 176 (*)     Albumin 3.3 (*)     All other components within normal limits   LIPID PANEL - Abnormal; Notable for the following:     Cholesterol 235 (*)     Triglycerides 316 (*)     HDL 38 (*)     HDL/Chol Ratio 16.2 (*)     Total Cholesterol/HDL Ratio 6.2 (*)     All other components within normal limits   ALCOHOL,MEDICAL (ETHANOL) - Abnormal; Notable for the following:     Alcohol, Medical, Serum 91 (*)     All other components within normal limits   ISTAT PROCEDURE - Abnormal; Notable for the following:     POC Glucose 178 (*)     POC Potassium 3.4 (*)     POC TCO2 (MEASURED) 20 (*)     All other components within normal limits   PROTIME-INR   TSH   PROTIME-INR   COMPREHENSIVE METABOLIC PANEL   LIPID PANEL   HEMOGLOBIN A1C   TSH   CBC W/ AUTO DIFFERENTIAL   MAGNESIUM   PHOSPHORUS   POCT GLUCOSE   TYPE & SCREEN   ISTAT CREATININE   ISTAT PROCEDURE   POCT GLUCOSE MONITORING CONTINUOUS     EKG Readings: (Independently Interpreted)   Sinus tachycardia 116 bpm. No ST elevations. Acute Tc of 478 ms          Medical Decision Making:   History:   Old Medical Records: I decided to obtain old medical records.  Initial Assessment:   Notified stroke neurology immediately on arrival and he was immediately transported to CT.  Differential Diagnosis:           Independently Interpreted Test(s):   I have ordered and independently interpreted EKG Reading(s) - see  prior notes  Clinical Tests:   Lab Tests: Ordered and Reviewed  Radiological Study: Ordered and Reviewed  Medical Tests: Ordered and Reviewed  ED Management:  Medical Decision Making:    This is an emergent evaluation of a patient presenting to the ED with CVA symptoms. His ISTAT creatinine is 0.9 which is normal. CT of his brain reviewed by me and cardiology and there were no acute findings. Stroke neurology was emergently notified and evaluated in the ED. They think he is a tPA candidate. Neuro intensive care unit was contacted and he will be admitted to neuro intensive care unit for close evaluation post tPA.  Nursing notes were reviewed.  I personally reviewed, read, and interpreted the ECG and any monitoring strips.  I reviewed radiology images personally along with interpretations.  I personally reviewed and interpreted the laboratory results.  Communicated with another physician regarding patient's care    Salo Patel MD, SHIREEN        Critical Care Time    Critical care time was provided for 30 minutes exclusive of other billable procedures and teaching time for the support of Neuro organ system where the potential for death, shock, or further decline was possible.  Critical care time can include documentation, discussion with consultants, developing a care plan, as well as direct patient care.    Fermin Patel MD  Other:   I have discussed this case with another health care provider.            Scribe Attestation:   Scribe #1: I performed the above scribed service and the documentation accurately describes the services I performed. I attest to the accuracy of the note.    Attending Attestation:           Physician Attestation for Scribe:  Physician Attestation Statement for Scribe #1: I, Dr. Patel, reviewed documentation, as scribed by Codey Moncada in my presence, and it is both accurate and complete.                 ED Course      Clinical Impression:   Diagnoses of CVA (cerebral vascular accident), HTN  (hypertension), and Cerebral infarction due to thrombosis of left middle cerebral artery were pertinent to this visit.    Disposition:   Disposition: Admitted         Salo Patel MD, SHIREEN, CPE, FACEP  Department of Emergency Medicine  , [employer] [facility] [employer]                   Fermin Patel MD  09/26/17 1024

## 2017-09-25 NOTE — PLAN OF CARE
Problem: Physical Therapy Goal  Goal: Physical Therapy Goal  Outcome: Outcome(s) achieved Date Met: 09/25/17  Pt is safe and independent with all mobility. Pt no longer requires PT services.  Amara Hilario PT, DPT  9/25/2017  Pager: [phone]

## 2017-09-25 NOTE — HPI
"Mr Jo is a 56 yo male with a PMH oh HTN, CAD, CABG, and tobacco abuse who present to [facility] s/p tPA. Pt called EMS after having slurred speech and being unable to "work remote" approximately 30mins pta. Upon arrival to hospital, pt was dysarthric. After evaluation in the ED he was given tPA finished 0058 . He is being admitted to [facility] for close neurologic monitoring and a higher level of care.    "

## 2017-09-25 NOTE — HPI
58 y/o male who was sitting watching TV and at 2300 he all of a sudden could not remember how to use the remote and was dysarthric and aphasic. His wife brought him to the ED, Upon evaluation patient is still aphasic, dysarthria and facial droop. He is anxious because he can not speak right and say what he wants to say. Jay slight left hand weakness. CT scan head with no acute changes. Discussed with Dr Ricks and OK to give IV TPA.   Denies any visual disturbance, sensory deficit, headache  Risk factors CAD, smoker

## 2017-09-25 NOTE — PROGRESS NOTES
Patient arrived to [facility] from [facility] ED    Type of Stroke: Ischemic    TPA start time and end time (if applicable) 2357 and 0057    Patients current symptoms include expressive aphasia and right facial droop.    Notified Georges De La Cruz NP of patient's arrival.

## 2017-09-25 NOTE — PLAN OF CARE
Problem: Nutrition, Imbalanced: Inadequate Oral Intake (Adult)  Goal: Identify Related Risk Factors and Signs and Symptoms  Related risk factors and signs and symptoms are identified upon initiation of Human Response Clinical Practice Guideline (CPG)   Outcome: Ongoing (interventions implemented as appropriate)  Nutrition assessment completed. Please see RD note for details.    Recommendation/Intervention:   1. As medically able, advance diet to Cardiac with texture per SLP recommendations.   2. If po intake <50% of meals, add Boost TID to increase caloric intake.   3. Education on cardiac diet provided to pt (chol 230, TRIG 338). Pt verbalized understanding.      RD to monitor.

## 2017-09-25 NOTE — HOSPITAL COURSE
9/25: Admit [facility] s/p tPA finished @ 0058  9/26:CTA L- M3  occlusion with no thrombectomy. S/P rTPA. Got rTPA start time and end time (if applicable) 2357 and 0057.  MRI brain with small cortical fronto parietal embolic stroke.  Punctate foci diffusion restriction with associated edema signal within the left posterior, left frontal lobe, and left insula compatible with acute infarction. No ICH, No clinical changes. We diid some counseling regarding drug.

## 2017-09-25 NOTE — HOSPITAL COURSE
56 y/o male with aphasia, dysarthria and facial droop. Received IV TPA. NO IR as L M3 occlusion and calcified ICA.     Patient with some bradycardia on day of dc- EKG reviewed - sinus luis a, no signs of heart block     Beta blocker discontinued due to admitted cocaine use.   Patient to follow in priority clinic  Stroke symptoms resolved     Inpatient acute stroke work up completed and patient is stable for discharge.  Please see imaging and discharge medication list below.

## 2017-09-25 NOTE — ASSESSMENT & PLAN NOTE
Antithrombotics: may begin aspirin 325 mg daily on 9-26-27 at 0900 if no conversion  Statins: Lipitor 40 mg daily  Aggressive risk factor modification: Smoking and Carotid Artery disease,   Rehab Efforts: Physical Therapy, Occupational Therapy and Speech and Language Pathology,   Diagnostics: Ordered/Pending HgbA1C to assess blood glucose levels, Lipid Profile to assess cholesterol levels, MRI head without contrast to assess brain parenchyma, Trans-thoracic cardiac echo to assess cardiac function/status, TSH to assess thyroid function   VTE Prophylaxis:SCD's may begin heparin 5000 units sc Q8H on 9-26-17 at 0600

## 2017-09-25 NOTE — CONSULTS
[facility]  Adult Nutrition  Consult Note    SUMMARY     Recommendations    Recommendation/Intervention:   1. As medically able, advance diet to Cardiac with texture per SLP recommendations.   2. If po intake <50% of meals, add Boost TID to increase caloric intake.   3. Education on cardiac diet provided to pt (chol 230, TRIG 338). Pt verbalized understanding.     RD to monitor.      Goals: Pt to tolerate >50% of meals s/p diet advancement.   Nutrition Goal Status: new  Communication of RD Recs: reviewed with RN    Reason for Assessment    Reason for Assessment: nurse/nurse practitioner consult  Diagnosis: stroke/CVA  Relevent Medical History: HTN, CAD, CABG         General Information Comments: Reg/thin per SLP. Pt remains NPO. Verbal diet education on cardiac diet provided. Pt verbalized understanding.    Nutrition Discharge Planning: adequate po intake for optimal nutrition with cardiac restriction    Nutrition Prescription Ordered    Current Diet Order: NPO     Evaluation of Received Nutrients/Fluid Intake     % Intake of Estimated Energy Needs: 0 - 25 %  % Meal Intake: NPO     Nutrition Risk Screen     Nutrition Risk Screen: no indicators present    Nutrition/Diet History       Typical Food/Fluid Intake: Pt reports adequate po intake for optimal nutrition. Remains NPO. No weight loss reported.  Food Preferences: No Scientology/cultural preferences identified at this time.        Factors Affecting Nutritional Intake: NPO                Labs/Tests/Procedures/Meds       Pertinent Labs Reviewed: reviewed  Pertinent Labs Comments: HgbA1c 5.2, Chol 230, TRIG 338  Pertinent Medications Reviewed: reviewed  Pertinent Medications Comments: noted    Physical Findings    Overall Physical Appearance: nourished        Skin: intact    Anthropometrics    Temp: 98.4 °F (36.9 °C)     Height: 6' (182.9 cm)  Weight Method: Bed Scale  Weight: 74.8 kg (164 lb 14.5 oz)  Ideal Body Weight (IBW), Male: 178 lb     %  Ideal Body Weight, Male (lb): 92.65 lb     BMI (Calculated): 22.4  BMI Grade: 18.5-24.9 - normal                            Estimated/Assessed Needs    Weight Used For Calorie Calculations: 74.8 kg (164 lb 14.5 oz)      Energy Calorie Requirements (kcal): 2013  Energy Need Method: Seney-St Jeor (PAL 1.25)        RMR (Seney-St. Jeor Equation): 1611        Weight Used For Protein Calculations: 74.8 kg (164 lb 14.5 oz)  Protein Requirements: 75-90g (1.0-1.2g/kg)    Fluid Requirements (mL): 1mL/kcal or per MD  RDA Method (mL): 2013               Assessment and Plan    CVA (cerebral vascular accident)    Nutrition Problem:  Food and nutrition related knowledge deficit    Related to (etiology):   No prior exposure to nutrition related education    Signs and Symptoms (as evidenced by):   Chol 230, TRIG 338, pt reports not previously following cardiac diet.     Interventions/Recommendations (treatment strategy):  Please see RD recs above.    Nutrition Diagnosis Status:   New              Monitor and Evaluation    Food and Nutrient Intake: energy intake, food and beverage intake  Food and Nutrient Adminstration: diet order  Knowledge/Beliefs/Attitudes: food and nutrition knowledge/skill     Anthropometric Measurements: weight, weight change, body mass index  Biochemical Data, Medical Tests and Procedures: electrolyte and renal panel, gastrointestinal profile, glucose/endocrine profile, inflammatory profile, lipid profile  Nutrition-Focused Physical Findings: overall appearance    Nutrition Risk    Level of Risk: other (see comments) (f/u 1x/week)    Nutrition Follow-Up    RD Follow-up?: Yes

## 2017-09-26 VITALS
RESPIRATION RATE: 25 BRPM | WEIGHT: 164.88 LBS | OXYGEN SATURATION: 95 % | SYSTOLIC BLOOD PRESSURE: 127 MMHG | TEMPERATURE: 98 F | BODY MASS INDEX: 22.33 KG/M2 | DIASTOLIC BLOOD PRESSURE: 67 MMHG | HEART RATE: 50 BPM | HEIGHT: 72 IN

## 2017-09-26 PROBLEM — F14.10 COCAINE ABUSE: Status: ACTIVE | Noted: 2017-09-26

## 2017-09-26 LAB
ALBUMIN SERPL BCP-MCNC: 3.2 G/DL
ALP SERPL-CCNC: 93 U/L
ALT SERPL W/O P-5'-P-CCNC: 21 U/L
ANION GAP SERPL CALC-SCNC: 10 MMOL/L
AST SERPL-CCNC: 22 U/L
BASOPHILS # BLD AUTO: 0.04 K/UL
BASOPHILS NFR BLD: 0.5 %
BILIRUB SERPL-MCNC: 0.6 MG/DL
BUN SERPL-MCNC: 12 MG/DL
CALCIUM SERPL-MCNC: 8.7 MG/DL
CHLORIDE SERPL-SCNC: 104 MMOL/L
CO2 SERPL-SCNC: 23 MMOL/L
CREAT SERPL-MCNC: 0.7 MG/DL
DIFFERENTIAL METHOD: ABNORMAL
EOSINOPHIL # BLD AUTO: 0.2 K/UL
EOSINOPHIL NFR BLD: 2.3 %
ERYTHROCYTE [DISTWIDTH] IN BLOOD BY AUTOMATED COUNT: 13 %
EST. GFR  (AFRICAN AMERICAN): >60 ML/MIN/1.73 M^2
EST. GFR  (NON AFRICAN AMERICAN): >60 ML/MIN/1.73 M^2
GLUCOSE SERPL-MCNC: 104 MG/DL
HCT VFR BLD AUTO: 45.7 %
HGB BLD-MCNC: 15.9 G/DL
LYMPHOCYTES # BLD AUTO: 2.5 K/UL
LYMPHOCYTES NFR BLD: 33.2 %
MAGNESIUM SERPL-MCNC: 2.1 MG/DL
MCH RBC QN AUTO: 32.5 PG
MCHC RBC AUTO-ENTMCNC: 34.8 G/DL
MCV RBC AUTO: 94 FL
MONOCYTES # BLD AUTO: 0.5 K/UL
MONOCYTES NFR BLD: 7 %
NEUTROPHILS # BLD AUTO: 4.3 K/UL
NEUTROPHILS NFR BLD: 56.9 %
PHOSPHATE SERPL-MCNC: 2.8 MG/DL
PLATELET # BLD AUTO: 210 K/UL
PMV BLD AUTO: 10.3 FL
POCT GLUCOSE: 106 MG/DL (ref 70–110)
POCT GLUCOSE: 91 MG/DL (ref 70–110)
POCT GLUCOSE: 97 MG/DL (ref 70–110)
POTASSIUM SERPL-SCNC: 3.8 MMOL/L
PROT SERPL-MCNC: 7.1 G/DL
RBC # BLD AUTO: 4.89 M/UL
SODIUM SERPL-SCNC: 137 MMOL/L
WBC # BLD AUTO: 7.54 K/UL

## 2017-09-26 PROCEDURE — 93010 ELECTROCARDIOGRAM REPORT: CPT | Mod: ,,, | Performed by: INTERNAL MEDICINE

## 2017-09-26 PROCEDURE — 92523 SPEECH SOUND LANG COMPREHEN: CPT

## 2017-09-26 PROCEDURE — 93005 ELECTROCARDIOGRAM TRACING: CPT

## 2017-09-26 PROCEDURE — 85025 COMPLETE CBC W/AUTO DIFF WBC: CPT

## 2017-09-26 PROCEDURE — 25000003 PHARM REV CODE 250: Performed by: STUDENT IN AN ORGANIZED HEALTH CARE EDUCATION/TRAINING PROGRAM

## 2017-09-26 PROCEDURE — 25000003 PHARM REV CODE 250: Performed by: NURSE PRACTITIONER

## 2017-09-26 PROCEDURE — 80053 COMPREHEN METABOLIC PANEL: CPT

## 2017-09-26 PROCEDURE — 84100 ASSAY OF PHOSPHORUS: CPT

## 2017-09-26 PROCEDURE — 99233 SBSQ HOSP IP/OBS HIGH 50: CPT | Mod: ,,, | Performed by: PSYCHIATRY & NEUROLOGY

## 2017-09-26 PROCEDURE — 63600175 PHARM REV CODE 636 W HCPCS: Performed by: NURSE PRACTITIONER

## 2017-09-26 PROCEDURE — 99238 HOSP IP/OBS DSCHRG MGMT 30/<: CPT | Mod: ,,, | Performed by: PSYCHIATRY & NEUROLOGY

## 2017-09-26 PROCEDURE — S4991 NICOTINE PATCH NONLEGEND: HCPCS | Performed by: STUDENT IN AN ORGANIZED HEALTH CARE EDUCATION/TRAINING PROGRAM

## 2017-09-26 PROCEDURE — 93306 TTE W/DOPPLER COMPLETE: CPT

## 2017-09-26 PROCEDURE — 25000003 PHARM REV CODE 250: Performed by: PHYSICIAN ASSISTANT

## 2017-09-26 PROCEDURE — 83735 ASSAY OF MAGNESIUM: CPT

## 2017-09-26 PROCEDURE — A4216 STERILE WATER/SALINE, 10 ML: HCPCS | Performed by: NURSE PRACTITIONER

## 2017-09-26 PROCEDURE — 93306 TTE W/DOPPLER COMPLETE: CPT | Mod: 26,,, | Performed by: INTERNAL MEDICINE

## 2017-09-26 RX ORDER — ASPIRIN 325 MG
325 TABLET ORAL DAILY
Qty: 30 TABLET | Refills: 1 | Status: SHIPPED | OUTPATIENT
Start: 2017-09-27 | End: 2018-09-27

## 2017-09-26 RX ORDER — SERTRALINE HYDROCHLORIDE 25 MG/1
25 TABLET, FILM COATED ORAL DAILY
Qty: 30 TABLET | Refills: 1 | Status: SHIPPED | OUTPATIENT
Start: 2017-09-26 | End: 2017-10-05 | Stop reason: SDUPTHER

## 2017-09-26 RX ORDER — AMLODIPINE BESYLATE 5 MG/1
5 TABLET ORAL DAILY
Status: DISCONTINUED | OUTPATIENT
Start: 2017-09-26 | End: 2017-09-26 | Stop reason: HOSPADM

## 2017-09-26 RX ORDER — AMLODIPINE BESYLATE 5 MG/1
5 TABLET ORAL DAILY
Qty: 30 TABLET | Refills: 1 | Status: SHIPPED | OUTPATIENT
Start: 2017-09-26 | End: 2017-10-05

## 2017-09-26 RX ORDER — IBUPROFEN 200 MG
1 TABLET ORAL DAILY
Refills: 0 | COMMUNITY
Start: 2017-09-27 | End: 2017-10-05

## 2017-09-26 RX ORDER — HEPARIN SODIUM 5000 [USP'U]/ML
5000 INJECTION, SOLUTION INTRAVENOUS; SUBCUTANEOUS EVERY 8 HOURS
Status: DISCONTINUED | OUTPATIENT
Start: 2017-09-26 | End: 2017-09-26 | Stop reason: HOSPADM

## 2017-09-26 RX ORDER — ATORVASTATIN CALCIUM 40 MG/1
40 TABLET, FILM COATED ORAL DAILY
Qty: 30 TABLET | Refills: 1 | Status: SHIPPED | OUTPATIENT
Start: 2017-09-26 | End: 2017-10-05 | Stop reason: SDUPTHER

## 2017-09-26 RX ORDER — LISINOPRIL 5 MG/1
5 TABLET ORAL DAILY
Qty: 30 TABLET | Refills: 1 | Status: SHIPPED | OUTPATIENT
Start: 2017-09-26 | End: 2017-11-03 | Stop reason: SDUPTHER

## 2017-09-26 RX ORDER — ASPIRIN 325 MG
325 TABLET ORAL DAILY
Status: DISCONTINUED | OUTPATIENT
Start: 2017-09-26 | End: 2017-09-26 | Stop reason: HOSPADM

## 2017-09-26 RX ORDER — AMLODIPINE BESYLATE 5 MG/1
5 TABLET ORAL DAILY
Status: DISCONTINUED | OUTPATIENT
Start: 2017-09-26 | End: 2017-09-26

## 2017-09-26 RX ADMIN — ASPIRIN 325 MG ORAL TABLET 325 MG: 325 PILL ORAL at 09:09

## 2017-09-26 RX ADMIN — STANDARDIZED SENNA CONCENTRATE AND DOCUSATE SODIUM 1 TABLET: 8.6; 5 TABLET, FILM COATED ORAL at 09:09

## 2017-09-26 RX ADMIN — SODIUM CHLORIDE, PRESERVATIVE FREE 3 ML: 5 INJECTION INTRAVENOUS at 06:09

## 2017-09-26 RX ADMIN — SERTRALINE HYDROCHLORIDE 25 MG: 25 TABLET ORAL at 09:09

## 2017-09-26 RX ADMIN — ATORVASTATIN CALCIUM 40 MG: 20 TABLET, FILM COATED ORAL at 09:09

## 2017-09-26 RX ADMIN — METOPROLOL TARTRATE 12.5 MG: 25 TABLET, FILM COATED ORAL at 09:09

## 2017-09-26 RX ADMIN — LISINOPRIL 5 MG: 5 TABLET ORAL at 09:09

## 2017-09-26 RX ADMIN — NICOTINE 1 PATCH: 21 PATCH, EXTENDED RELEASE TRANSDERMAL at 09:09

## 2017-09-26 RX ADMIN — HEPARIN SODIUM 5000 UNITS: 5000 INJECTION, SOLUTION INTRAVENOUS; SUBCUTANEOUS at 09:09

## 2017-09-26 NOTE — PROGRESS NOTES
"[facility]  Neurocritical Care  Progress Note    Admit Date: 9/24/2017  Service Date: 09/26/2017  Length of Stay: 1    Subjective:     Chief Complaint: Cerebral infarction due to thrombosis of left middle cerebral artery    History of Present Illness: Mr Jo is a 58 yo male with a PMH oh HTN, CAD, CABG, and tobacco abuse who present to [facility] s/p tPA. Pt called EMS after having slurred speech and being unable to "work remote" approximately 30mins pta. Upon arrival to hospital, pt was dysarthric. After evaluation in the ED he was given tPA finished 0058 . He is being admitted to [facility] for close neurologic monitoring and a higher level of care.      Hospital Course: 9/25: Admit [facility] s/p tPA finished @ 0058 9/26:CTA L- M3  occlusion with no thrombectomy. S/P rTPA. Got rTPA start time and end time (if applicable) 2357 and 0057.  MRI brain with small cortical fronto parietal embolic stroke.  Punctate foci diffusion restriction with associated edema signal within the left posterior, left frontal lobe, and left insula compatible with acute infarction. No ICH, No clinical changes. We diid some counseling regarding drug.    Interval History: >4 elements OR status of 3 inpatient conditions  CTA L- M3  occlusion with no thrombectomy. S/P rTPA. Got rTPA start time and end time (if applicable) 2357 and 0057.  MRI brain with small cortical fronto parietal embolic stroke.  Punctate foci diffusion restriction with associated edema signal within the left posterior, left frontal lobe, and left insula compatible with acute infarction. No ICH, No clinical changes. We diid some counseling regarding drug.  Review of Systems  2 systems OR Unable to obtain a complete ROS due to level of consciousness.  Objective:     Vitals:  Temp: 98.4 °F (36.9 °C) (09/26/17 1105)  Pulse: (!) 50 (09/26/17 1200)  Resp: (!) 25 (09/26/17 1200)  BP: 127/67 (09/26/17 1200)  SpO2: 95 % (09/26/17 1200)    Temp:  [98 °F (36.7 °C)-98.6 °F (37 " °C)] 98.4 °F (36.9 °C)  Pulse:  [49-77] 50  Resp:  [13-57] 25  SpO2:  [93 %-98 %] 95 %  BP: ()/(51-82) 127/67              No intake/output data recorded.    Physical Exam  Unable to test gait due to level of consciousness.  General   HEENT:   Chest Heart RRR / Lungs Clear to auscultation  Adbomen: Soft nontender + BS  Extremities: OK distal pulses.  Skin:   Neurological Exam:  MS; Alert, oriented to P/T/P/R, No language abnormalities. Normal mood.  CN: II-XII   Motor: LUE  5 /5 / RUE  5/5  Tone normal bilaterally             LLE  5 /5 /  RLE  5/5  Tone normal bilaterally  Sensory: LT/PP/T/ Vibration                  Complex sensory modalities: not tested  DTR:  normal throughout.  Coordination /Fine motor:   Gait: Not tested.  Meningeal signs: Absent  Medications:  Continuous Scheduled  amlodipine 5 mg Daily   aspirin 325 mg Daily   atorvastatin 40 mg Daily   heparin (porcine) 5,000 Units Q8H   lisinopril 5 mg Daily   nicotine 1 patch Daily   senna-docusate 8.6-50 mg 1 tablet Daily   sertraline 25 mg Daily   sodium chloride 0.9% 3 mL Q8H   PRN  ondansetron 4 mg Q12H PRN     Today I personally reviewed pertinent medications, lines/drains/airways, imaging, cardiology, lab results, microbiology results, notably:      Assessment/Plan:     No new Assessment & Plan notes have been filed under this hospital service since the last note was generated.  Service: Neuro Critical Care      Active Problem List:   1.Acute  ischemic event on L MCA territory s/p rTPA.  2. CAD s/p CABG  3. HLP.  4. Smoker  5. + cocaine.     Assessment / Plan:     Neuro: s/p rTPA  -Lipitor 40mg qd  -ECASA 325mg qd.  -Nicotine patch 21 mg qd and taper every week by 7mg until off.  Resp:  RA  IS  CV: Keep SBP <=180 mmHg.  -PRN hydralazine.  -Lisinopril 5mg qd  -ECASA 325mg qd.  -Amlodipine 5mg qd  -D/C Metorpolol  -TTE pending.  -12 lead ECG  IVF/nutrition/Renal/GI: Chem 10 OK.  -PO diet.  -IVF D/C.  -BR  Hem / ID:  -Pending to start  antiplatelets after MRI.  Endo: HgA1C 5.2 .  -SSI qAC  -Lipitor 40mg qd.  Prophylaxis:  -SC Heparin prophylaxis   -SCDs.  Advance Directives and Disposition:    Full Code. Transfer to stroke.           Uninterrupted level 3  Follow-up /Counseling Time (not including procedures):  = 35 min         Activity Orders          None        Full Code    Oscar Garcia MD  Neurocritical Care  [facility]

## 2017-09-26 NOTE — PLAN OF CARE
09/26/17 1451   Final Note   Assessment Type Final Discharge Note   Discharge Disposition Home   What phone number can be called within the next 1-3 days to see how you are doing after discharge? (daughter Andree Jo [phone])   Hospital Follow Up  Appt(s) scheduled? Yes   Discharge plans and expectations educations in teach back method with documentation complete? Yes  (by staff nurse)   Right Care Referral Info   Post Acute Recommendation No Care     Future Appointments  Date Time Provider Department Center   10/5/2017 10:00 AM FAUSTO Ordaz [facility] IM[facility]ICL [place] [facility]   10/17/2017 8:20 AM Beau Mcmillan MD [facility] NEURO [facility]  [id number]

## 2017-09-26 NOTE — SUBJECTIVE & OBJECTIVE
NIH Stroke Scale:  Interval: 7 days or at discharge (whichever comes first)  Level of Consciousness: 0 - alert  LOC Questions: 0 - answers both correctly  LOC Commands: 0 - performs both correctly  Best Gaze: 0 - normal  Visual: 0 - no visual loss  Facial Palsy: 0 - normal  Motor Left Arm: 0 - no drift  Motor Right Arm: 0 - no drift  Motor Left Le - no drift  Motor Right Le - no drift  Limb Ataxia: 0 - absent  Sensory: 0 - normal  Best Language: 0 - no aphasia  Dysarthria: 0 - normal articulation  Extinction and Inattention: 0 - no neglect  NIH Stroke Scale Total: 0  Modified Bogdan Scale:   Timeline: At discharge  Modified Brookston Score: 0 - no symptoms      17 - MRI   1.  Scattered punctate acute infarcts within the left MCA territory involving the left insula, frontal, and anterior parietal lobes.  No hemorrhagic conversion or other acute intracranial process.    2.  Mild chronic microvascular ischemic changes.     17 - CTA stroke   1.  Left M3 occlusion.  Findings identified at 1220 a.m. and discussed with Dr. Hackett with interventional neuroradiology at 1222 a.m.      2. Moderate bilateral atherosclerosis at the carotid bifurcations.  Diffusely small caliber left cervical ICA concerning for hemodynamically significant stenosis, though technically with less than 50% narrowing per NASCET criteria.  Correlation with carotid Doppler recommended.    3.  Significant stenosis of the right vertebral artery involving the proximal V4 segment.    4.  Postoperative changes of sternotomy and coronary artery bypass grafting.    5.  Moderate atherosclerosis of the aortic arch with mild narrowing of left common carotid and left subclavian arteries near their origins.     CT head 17  No acute intracranial pathology.       Echo 17  Normal LA   CONCLUSIONS     1 - Concentric remodeling.     2 - No wall motion abnormalities.     3 - Normal left ventricular systolic function (EF 60-65%).     4 -  Indeterminate LV diastolic function.     5 - Normal right ventricular systolic function .

## 2017-09-26 NOTE — PLAN OF CARE
Problem: Patient Care Overview  Goal: Plan of Care Review  Outcome: Ongoing (interventions implemented as appropriate)  POC reviewed with pt at 2300. Pt verbalized understanding. Questions and concerns addressed. Pt taken for 24 hr post tpa MRI at 2300. No acute events overnight. Pt progressing toward goals. Will continue to monitor. See flowsheets for full assessment and VS info

## 2017-09-26 NOTE — PROGRESS NOTES
[facility]  Vascular Neurology  Comprehensive Stroke Center  Progress Note    Assessment/Plan:     56 y/o male with aphasia, dysarthria and facial droop. Received IV TPA. NO IR as L M3 occlusion and calcified ICA.     Patient with some bradycardia today- EKG reviewed - sinus luis a  Beta blocker discontinued due to admitted cocaine use.     * Cerebral infarction due to thrombosis of left middle cerebral artery      Antithrombotics: asa 325  Statins: Lipitor 40 mg daily  Aggressive risk factor modification: Smoking and Carotid Artery disease,   Rehab Efforts: Physical Therapy, Occupational Therapy and Speech and Language Pathology - no needs per therapy teams   Diagnostics: Ordered/Pending HgbA1C to assess blood glucose levels, Lipid Profile to assess cholesterol levels, MRI head without contrast to assess brain parenchyma, Trans-thoracic cardiac echo to assess cardiac function/status, TSH to assess thyroid function   VTE Prophylaxis:SCD's may begin heparin 5000 units sc Q8H         Cocaine abuse    Noted on tox screen   Admitted use per patient   Discontinued beta blockers  Follow up with PCP and counseling  Stroke risk factor         Essential hypertension    Stroke risk factor  Normotensive         Dysarthria    Due to stroke  Aggressive therapy - no speech needs         Aphasia    Due to stroke  Aggressive therapy- improved , no speech needs         Coronary artery disease involving native coronary artery    Stroke risk factor            Neurologic Chief Complaint: stroke status post tpa     Subjective:     Interval History: Patient is seen for follow-up neurological assessment and treatment recommendations: bradycardic today, beta blocker stopped. ekg showing sinus luis a   Asymptomatic while bradycardic     HPI, Past Medical, Family, and Social History remains the same as documented in the initial encounter.     Review of Systems   Constitutional: Negative for fever.   Respiratory: Negative  for shortness of breath.    Neurological: Negative for speech difficulty and weakness.     Scheduled Meds:  Continuous Infusions:  PRN Meds:    Objective:     Vital Signs (Most Recent):  Temp: 98.4 °F (36.9 °C) (17 1105)  Pulse: (!) 50 (17 1200)  Resp: (!) 25 (17 1200)  BP: 127/67 (17 1200)  SpO2: 95 % (17 1200)  BP Location: Right arm    Vital Signs Range (Last 24H):  Temp:  [98 °F (36.7 °C)-98.4 °F (36.9 °C)]   Pulse:  [49-77]   Resp:  [13-57]   BP: ()/(51-73)   SpO2:  [93 %-98 %]   BP Location: Right arm    Physical Exam   Constitutional: He is oriented to person, place, and time. He appears well-developed and well-nourished.   HENT:   Head: Normocephalic and atraumatic.   Cardiovascular:   [place] to mid 40s today    Pulmonary/Chest: Effort normal.   Musculoskeletal: Normal range of motion.   Neurological: He is alert and oriented to person, place, and time.   Nursing note and vitals reviewed.      Neurological Exam:   Alert oriented  No weakness  No numbness  Normal EOMs   Normal facial symmetry   No aphasia or dysarthria     NIH Stroke Scale:    Level of Consciousness: 0 - alert  LOC Questions: 0 - answers both correctly  LOC Commands: 0 - performs both correctly  Best Gaze: 0 - normal  Visual: 0 - no visual loss  Facial Palsy: 0 - normal  Motor Left Arm: 0 - no drift  Motor Right Arm: 0 - no drift  Motor Left Le - no drift  Motor Right Le - no drift  Limb Ataxia: 0 - absent  Sensory: 0 - normal  Best Language: 0 - no aphasia  Dysarthria: 0 - normal articulation  Extinction and Inattention: 0 - no neglect  NIH Stroke Scale Total: 0      Laboratory:  CMP:   Recent Labs  Lab 17  0301   CALCIUM 8.7   ALBUMIN 3.2*   PROT 7.1      K 3.8   CO2 23      BUN 12   CREATININE 0.7   ALKPHOS 93   ALT 21   AST 22   BILITOT 0.6     CBC:   Recent Labs  Lab 17  0301   WBC 7.54   RBC 4.89   HGB 15.9   HCT 45.7      MCV 94   MCH 32.5*   MCHC 34.8     Lipid  Panel:   Recent Labs  Lab 09/25/17  0406   CHOL 230*   LDLCALC 126.4   HDL 36*   TRIG 338*     Coagulation:   Recent Labs  Lab 09/25/17  0002   INR 0.9     Platelet Aggregation Study: No results for input(s): PLTAGG, PLTAGINTERP, PLTAGREGLACO, ADPPLTAGGREG in the last 168 hours.  Hgb A1C:   Recent Labs  Lab 09/25/17 0406   HGBA1C 5.2     TSH:   Recent Labs  Lab 09/25/17 0406   TSH 2.389       Diagnostic Results:  I have personally reviewed:  9/26/17 - MRI   1.  Scattered punctate acute infarcts within the left MCA territory involving the left insula, frontal, and anterior parietal lobes.  No hemorrhagic conversion or other acute intracranial process.    2.  Mild chronic microvascular ischemic changes.    9/25/17 - CTA stroke   1.  Left M3 occlusion.  Findings identified at 1220 a.m. and discussed with Dr. Hackett with interventional neuroradiology at 1222 a.m.      2. Moderate bilateral atherosclerosis at the carotid bifurcations.  Diffusely small caliber left cervical ICA concerning for hemodynamically significant stenosis, though technically with less than 50% narrowing per NASCET criteria.  Correlation with carotid Doppler recommended.    3.  Significant stenosis of the right vertebral artery involving the proximal V4 segment.    4.  Postoperative changes of sternotomy and coronary artery bypass grafting.    5.  Moderate atherosclerosis of the aortic arch with mild narrowing of left common carotid and left subclavian arteries near their origins.    CT head 9/24/17  No acute intracranial pathology.      Echo 9/25/17  Normal LA   CONCLUSIONS     1 - Concentric remodeling.     2 - No wall motion abnormalities.     3 - Normal left ventricular systolic function (EF 60-65%).     4 - Indeterminate LV diastolic function.     5 - Normal right ventricular systolic function .       EDER Hill  Comprehensive Stroke Center  Department of Vascular Neurology   [facility]

## 2017-09-26 NOTE — DISCHARGE SUMMARY
[facility]  Vascular Neurology  Comprehensive Stroke Center  Discharge Summary     Summary:     Admit Date: 2017 11:33 PM    Discharge Date and Time: 2017  1:28 PM    Attending Physician: Dr. Hernandez     Discharge Provider: EDER Hill    History of Present Illness: 58 y/o male who was sitting watching TV and at 2300 he all of a sudden could not remember how to use the remote and was dysarthric and aphasic. His wife brought him to the ED, Upon evaluation patient is still aphasic, dysarthria and facial droop. He is anxious because he can not speak right and say what he wants to say. Jay slight left hand weakness. CT scan head with no acute changes. Discussed with Dr Ricks and OK to give IV TPA.   Denies any visual disturbance, sensory deficit, headache  Risk factors CAD, smoker    Hospital Course (synopsis of major diagnoses, care, treatment, and services provided during the course of the hospital stay): 58 y/o male with aphasia, dysarthria and facial droop. Received IV TPA. NO IR as L M3 occlusion and calcified ICA.     Patient with some bradycardia on day of dc- EKG reviewed - sinus luis a, no signs of heart block     Beta blocker discontinued due to admitted cocaine use.   Patient to follow in priority clinic  Stroke symptoms resolved     Inpatient acute stroke work up completed and patient is stable for discharge.  Please see imaging and discharge medication list below.          NIH Stroke Scale:  Interval: 7 days or at discharge (whichever comes first)  Level of Consciousness: 0 - alert  LOC Questions: 0 - answers both correctly  LOC Commands: 0 - performs both correctly  Best Gaze: 0 - normal  Visual: 0 - no visual loss  Facial Palsy: 0 - normal  Motor Left Arm: 0 - no drift  Motor Right Arm: 0 - no drift  Motor Left Le - no drift  Motor Right Le - no drift  Limb Ataxia: 0 - absent  Sensory: 0 - normal  Best Language: 0 - no aphasia  Dysarthria: 0 - normal  articulation  Extinction and Inattention: 0 - no neglect  NIH Stroke Scale Total: 0  Modified Tryon Scale:   Timeline: At discharge  Modified Tryon Score: 0 - no symptoms      9/26/17 - MRI   1.  Scattered punctate acute infarcts within the left MCA territory involving the left insula, frontal, and anterior parietal lobes.  No hemorrhagic conversion or other acute intracranial process.    2.  Mild chronic microvascular ischemic changes.     9/25/17 - CTA stroke   1.  Left M3 occlusion.  Findings identified at 1220 a.m. and discussed with Dr. Hackett with interventional neuroradiology at 1222 a.m.      2. Moderate bilateral atherosclerosis at the carotid bifurcations.  Diffusely small caliber left cervical ICA concerning for hemodynamically significant stenosis, though technically with less than 50% narrowing per NASCET criteria.  Correlation with carotid Doppler recommended.    3.  Significant stenosis of the right vertebral artery involving the proximal V4 segment.    4.  Postoperative changes of sternotomy and coronary artery bypass grafting.    5.  Moderate atherosclerosis of the aortic arch with mild narrowing of left common carotid and left subclavian arteries near their origins.     CT head 9/24/17  No acute intracranial pathology.       Echo 9/25/17  Normal LA   CONCLUSIONS     1 - Concentric remodeling.     2 - No wall motion abnormalities.     3 - Normal left ventricular systolic function (EF 60-65%).     4 - Indeterminate LV diastolic function.     5 - Normal right ventricular systolic function .       Assessment/Plan:     Interventions: IV t-PA    Complications: None    Research Candidate?:  No    Neurological deficit at discharge: None     Disposition: Home or Self Care    Final Active Diagnoses:    Diagnosis Date Noted POA    PRINCIPAL PROBLEM:  Cerebral infarction due to thrombosis of left middle cerebral artery [I63.312] 09/25/2017 Yes    Cocaine abuse [F14.10] 09/26/2017 Unknown    Coronary  artery disease involving native coronary artery [I25.10] 09/25/2017 Yes    Aphasia [R47.01] 09/25/2017 Yes    Dysarthria [R47.1] 09/25/2017 Unknown    Cerebrovascular accident (CVA) [I63.9] 09/25/2017 Yes    Essential hypertension [I10] 09/25/2017 Unknown      Problems Resolved During this Admission:    Diagnosis Date Noted Date Resolved POA     * Cerebral infarction due to thrombosis of left middle cerebral artery      Antithrombotics: asa 325  Statins: Lipitor 40 mg daily  Aggressive risk factor modification: Smoking and Carotid Artery disease,   Rehab Efforts: Physical Therapy, Occupational Therapy and Speech and Language Pathology - no needs per therapy teams   Diagnostics: Ordered/Pending HgbA1C to assess blood glucose levels, Lipid Profile to assess cholesterol levels, MRI head without contrast to assess brain parenchyma, Trans-thoracic cardiac echo to assess cardiac function/status, TSH to assess thyroid function   VTE Prophylaxis:SCD's may begin heparin 5000 units sc Q8H         Cocaine abuse    Noted on tox screen   Admitted use per patient   Discontinued beta blockers  Follow up with PCP and counseling  Stroke risk factor         Essential hypertension    Stroke risk factor  Normotensive         Dysarthria    Due to stroke  Aggressive therapy - no speech needs         Aphasia    Due to stroke  Aggressive therapy- improved , no speech needs         Coronary artery disease involving native coronary artery    Stroke risk factor            Recommendations:     Post-discharge complication risks: None    Stroke Education given to: patient    Follow-up in Stroke Clinic in 4-6 weeks  PCP in one week     Discharge Plan:  Antithrombotics: Aspirin 325mg  Statin: Atorvastatin 40mg  Aggresive risk factor modification:  Hypertension, High Cholesterol and drug use   Smoking cessation     Follow Up:  Follow-up Information     FAUSTO Smith On 10/5/2017.    Specialty:  Internal Medicine  Why:  appointment  with priority care clinic at 10:00am  Contact information:  [address]  [facility] [address]  [phone]             Beau Mcmillan MD. Schedule an appointment as soon as possible for a visit on 10/17/2017.    Specialty:  Neurology  Why:  appointment time 8:20am  Contact information:  [address]  [address]  [place] [address]  [phone]                 Patient Instructions:     Diet general       Medications:  Reconciled Home Medications:   Discharge Medication List as of 9/26/2017 12:59 PM      START taking these medications    Details   amlodipine (NORVASC) 5 MG tablet Take 1 tablet (5 mg total) by mouth once daily., Starting Tue 9/26/2017, Until Wed 9/26/2018, Normal      aspirin 325 MG tablet Take 1 tablet (325 mg total) by mouth once daily., Starting Wed 9/27/2017, Until Thu 9/27/2018, Normal      nicotine (NICODERM CQ) 21 mg/24 hr Place 1 patch onto the skin once daily. Use for 6 weeks before stepping down to lower dosage patch., Starting Wed 9/27/2017, OTC         CONTINUE these medications which have CHANGED    Details   atorvastatin (LIPITOR) 40 MG tablet Take 1 tablet (40 mg total) by mouth once daily., Starting Tue 9/26/2017, Until Wed 9/26/2018, Normal      lisinopril (PRINIVIL,ZESTRIL) 5 MG tablet Take 1 tablet (5 mg total) by mouth once daily., Starting Tue 9/26/2017, Until Thu 10/26/2017, Normal      sertraline (ZOLOFT) 25 MG tablet Take 1 tablet (25 mg total) by mouth once daily., Starting Tue 9/26/2017, Until Thu 10/26/2017, Normal         STOP taking these medications       aspirin (ECOTRIN) 81 MG EC tablet Comments:   Reason for Stopping:         metoprolol tartrate (LOPRESSOR) 25 MG tablet Comments:   Reason for Stopping:               EDER Hill  Comprehensive Stroke Center  Department of Vascular Neurology   [facility]

## 2017-09-26 NOTE — PT/OT/SLP EVAL
"Speech Language Pathology Evaluation    Mina Jo   MRN: [id number]   Admitting Diagnosis: Cerebral infarction due to thrombosis of left middle cerebral artery    Diet recommendations: Solid Diet Level: Regular  Liquid Diet Level: Thin     SLP Treatment Date: 09/26/17  Speech Start Time: 0735     Speech Stop Time: 0750     Speech Total (min): 15 min       TREATMENT BILLABLE MINUTES:  Eval 15     Diagnosis: Cerebral infarction due to thrombosis of left middle cerebral artery      Past Medical History:   Diagnosis Date    Coronary artery disease      Past Surgical History:   Procedure Laterality Date    BYPASS GRAFT      triple bypass 2009    CARDIAC SURGERY      triple bypass x 2    ROTATOR CUFF REPAIR Right        Has the patient been evaluated by SLP for swallowing? : Yes  Keep patient NPO?: No   General Precautions: Standard, fall          Social Hx: Patient lives with self    Subjective:  "I am ready to go"    Pain/Comfort  Pain Rating 1: 0/10  Pain Rating Post-Intervention 1: 0/10     Objective:        Oral Musculature Evaluation  Oral Musculature: WNL  Dentition: present and adequate  Mucosal Quality: good  Mandibular Strength and Mobility: WNL  Oral Labial Strength and Mobility: WNL  Lingual Strength and Mobility: WNL  Velar Elevation: WNL  Buccal Strength and Mobility: WNL  Volitional Cough: strong  Volitional Swallow: no delay  Voice Prior to PO Intake: wnl     Cognitive Status:  Behavioral Observations: alert and appropriate-  Memory and Orientation: Pt. was oriented x3 with good recall of recent and remote temporal and general information.  Immediate/delayed verbal recall was wfl with pt. Repeating 7 digits and 5 words without difficulty.    Attention: wfl.  Problem Solving: Responses to hypothetical verbal problem solving tasks were accurate and complete.  Pt. Compared and contrasted objects and generated multiple solutions to problems.  Thought organization and categorization skills were wfl " with pt. Naming 14 animals given one minute when 15-20 are wnl.  Pt. Responded to functional math and time calculations with 100% accuracy   Pragmatics: wfl  Auditory Comprehension: Pt. Responded to complex yes/no questions and multi step commands with 100% accuracy and no delays in responding.        Verbal Expression: Verbal language skills were wfl with no evidence of aphasia.  Pt. Expressed their thoughts coherently in conversation with no evidence of confusion or word finding deficits      Motor Speech: wnl    Voice: wnl    Reading: Pt. Orally read paragraphs at midline with 100% accuracy.      Writing: wfl         FIM:  Social Interaction: 7 Complete [place]--The patient interacts appropriately with staff, other patients, and family members (e.g., controls temper, accepts criticism, is aware that words and actions have an impact on others), and does not require medication for   Problem Solvin Complete [place]--The patient consistently recognizes problems when present, makes appropriate decisions, initiates and carries out a sequence of steps to solve complex problems until the task is completed, and self-corrects if errors are made.   Comprehension: 7 Complete [place]--The patient understand complex or abstract directions and conversation, and understand either spoken or written language (not necessarily English).   Expression: 7 Complete [place]--The patient expresses complex or abstract ideas clearly and fluently (not necessarily in English).   Memory: 7 Complete [place]--The patient recognizes people frequently encountered, remebers daily routines, and executes requests of others without need for repetition.     Assessment:  Mina Jo is a 57 y.o. male with speech language and cognitive skills wfl    Do you have any cultural, spiritual, Jew conflicts, given your current situation?: no    Discharge recommendations: Discharge Facility/Level Of Care Needs: home      Goals:    SLP Goals        Problem: SLP Goal    Goal Priority Disciplines Outcome   SLP Goal     SLP Ongoing (interventions implemented as appropriate)   Description:  Speech Language Pathology Goals  Goals expected to be met by 10/2  1.  Pt. Will participate in speech language eval/goal                          Plan:   Patient to be seen Therapy Frequency: 5 x/week   Plan of Care expires: 10/23/17  Plan of Care reviewed with: patient (girlfriend)  SLP Follow-up?: Yes  SLP - Next Visit Date: 09/26/17           Dionne Kramer MA, CCC-SLP  09/26/2017

## 2017-09-26 NOTE — SUBJECTIVE & OBJECTIVE
Interval History: >4 elements OR status of 3 inpatient conditions  CTA L- M3  occlusion with no thrombectomy. S/P rTPA. Got rTPA start time and end time (if applicable) 2357 and 0057.  MRI brain with small cortical fronto parietal embolic stroke.  Punctate foci diffusion restriction with associated edema signal within the left posterior, left frontal lobe, and left insula compatible with acute infarction. No ICH, No clinical changes. We diid some counseling regarding drug.  Review of Systems  2 systems OR Unable to obtain a complete ROS due to level of consciousness.  Objective:     Vitals:  Temp: 98.4 °F (36.9 °C) (09/26/17 1105)  Pulse: (!) 50 (09/26/17 1200)  Resp: (!) 25 (09/26/17 1200)  BP: 127/67 (09/26/17 1200)  SpO2: 95 % (09/26/17 1200)    Temp:  [98 °F (36.7 °C)-98.6 °F (37 °C)] 98.4 °F (36.9 °C)  Pulse:  [49-77] 50  Resp:  [13-57] 25  SpO2:  [93 %-98 %] 95 %  BP: ()/(51-82) 127/67              No intake/output data recorded.    Physical Exam  Unable to test gait due to level of consciousness.  General   HEENT: UK  Chest Heart RRR / Lungs Clear to auscultation  Adbomen: Soft nontender + BS  Extremities: OK distal pulses.  Skin: UK  Neurological Exam:  MS; Alert, oriented to P/T/P/R, No language abnormalities. Normal mood.  CN: II-XII UK  Motor: LUE  5 /5 / RUE  5/5  Tone normal bilaterally             LLE  5 /5 /  RLE  5/5  Tone normal bilaterally  Sensory: LT/PP/T/ Vibration UK                 Complex sensory modalities: not tested  DTR:  normal throughout.  Coordination /Fine motor:   Gait: Not tested.  Meningeal signs: Absent  Medications:  Continuous Scheduled  amlodipine 5 mg Daily   aspirin 325 mg Daily   atorvastatin 40 mg Daily   heparin (porcine) 5,000 Units Q8H   lisinopril 5 mg Daily   nicotine 1 patch Daily   senna-docusate 8.6-50 mg 1 tablet Daily   sertraline 25 mg Daily   sodium chloride 0.9% 3 mL Q8H   PRN  ondansetron 4 mg Q12H PRN     Today I personally reviewed pertinent  medications, lines/drains/airways, imaging, cardiology, lab results, microbiology results, notably:

## 2017-09-26 NOTE — SUBJECTIVE & OBJECTIVE
Neurologic Chief Complaint: stroke status post tpa     Subjective:     Interval History: Patient is seen for follow-up neurological assessment and treatment recommendations: bradycardic today, beta blocker stopped. ekg showing sinus luis a   Asymptomatic while bradycardic     HPI, Past Medical, Family, and Social History remains the same as documented in the initial encounter.     Review of Systems   Constitutional: Negative for fever.   Respiratory: Negative for shortness of breath.    Neurological: Negative for speech difficulty and weakness.     Scheduled Meds:  Continuous Infusions:  PRN Meds:    Objective:     Vital Signs (Most Recent):  Temp: 98.4 °F (36.9 °C) (17 1105)  Pulse: (!) 50 (17 1200)  Resp: (!) 25 (17 1200)  BP: 127/67 (17 1200)  SpO2: 95 % (17 1200)  BP Location: Right arm    Vital Signs Range (Last 24H):  Temp:  [98 °F (36.7 °C)-98.4 °F (36.9 °C)]   Pulse:  [49-77]   Resp:  [13-57]   BP: ()/(51-73)   SpO2:  [93 %-98 %]   BP Location: Right arm    Physical Exam   Constitutional: He is oriented to person, place, and time. He appears well-developed and well-nourished.   HENT:   Head: Normocephalic and atraumatic.   Cardiovascular:   Luis A to mid 40s today    Pulmonary/Chest: Effort normal.   Musculoskeletal: Normal range of motion.   Neurological: He is alert and oriented to person, place, and time.   Nursing note and vitals reviewed.      Neurological Exam:   Alert oriented  No weakness  No numbness  Normal EOMs   Normal facial symmetry   No aphasia or dysarthria     NIH Stroke Scale:    Level of Consciousness: 0 - alert  LOC Questions: 0 - answers both correctly  LOC Commands: 0 - performs both correctly  Best Gaze: 0 - normal  Visual: 0 - no visual loss  Facial Palsy: 0 - normal  Motor Left Arm: 0 - no drift  Motor Right Arm: 0 - no drift  Motor Left Le - no drift  Motor Right Le - no drift  Limb Ataxia: 0 - absent  Sensory: 0 - normal  Best Language: 0 -  no aphasia  Dysarthria: 0 - normal articulation  Extinction and Inattention: 0 - no neglect  NIH Stroke Scale Total: 0      Laboratory:  CMP:   Recent Labs  Lab 09/26/17  0301   CALCIUM 8.7   ALBUMIN 3.2*   PROT 7.1      K 3.8   CO2 23      BUN 12   CREATININE 0.7   ALKPHOS 93   ALT 21   AST 22   BILITOT 0.6     CBC:   Recent Labs  Lab 09/26/17  0301   WBC 7.54   RBC 4.89   HGB 15.9   HCT 45.7      MCV 94   MCH 32.5*   MCHC 34.8     Lipid Panel:   Recent Labs  Lab 09/25/17  0406   CHOL 230*   LDLCALC 126.4   HDL 36*   TRIG 338*     Coagulation:   Recent Labs  Lab 09/25/17  0002   INR 0.9     Platelet Aggregation Study: No results for input(s): PLTAGG, PLTAGINTERP, PLTAGREGLACO, ADPPLTAGGREG in the last 168 hours.  Hgb A1C:   Recent Labs  Lab 09/25/17 0406   HGBA1C 5.2     TSH:   Recent Labs  Lab 09/25/17 0406   TSH 2.389       Diagnostic Results:  I have personally reviewed:  9/26/17 - MRI   1.  Scattered punctate acute infarcts within the left MCA territory involving the left insula, frontal, and anterior parietal lobes.  No hemorrhagic conversion or other acute intracranial process.    2.  Mild chronic microvascular ischemic changes.    9/25/17 - CTA stroke   1.  Left M3 occlusion.  Findings identified at 1220 a.m. and discussed with Dr. Hackett with interventional neuroradiology at 1222 a.m.      2. Moderate bilateral atherosclerosis at the carotid bifurcations.  Diffusely small caliber left cervical ICA concerning for hemodynamically significant stenosis, though technically with less than 50% narrowing per NASCET criteria.  Correlation with carotid Doppler recommended.    3.  Significant stenosis of the right vertebral artery involving the proximal V4 segment.    4.  Postoperative changes of sternotomy and coronary artery bypass grafting.    5.  Moderate atherosclerosis of the aortic arch with mild narrowing of left common carotid and left subclavian arteries near their origins.    CT head  9/24/17  No acute intracranial pathology.      Echo 9/25/17  Normal LA   CONCLUSIONS     1 - Concentric remodeling.     2 - No wall motion abnormalities.     3 - Normal left ventricular systolic function (EF 60-65%).     4 - Indeterminate LV diastolic function.     5 - Normal right ventricular systolic function .

## 2017-09-26 NOTE — PLAN OF CARE
Problem: SLP Goal  Goal: SLP Goal  Speech Language Pathology Goals  Goals expected to be met by 10/2  1.  Pt. Will participate in speech language eval/goal       Outcome: Ongoing (interventions implemented as appropriate)  No further speech therapy recommended.    Dionne Kramer MA/[facility]-SLP  Speech Language Pathologist  Pager [phone]  9/26/2017

## 2017-09-26 NOTE — PLAN OF CARE
SW completed work excuse letter for Pt daughter and submitted to [facility] AA/Release of information to have approved and signed.    Sarah Khanna LMSW  Neurocritical Care   [facility]  [id number]

## 2017-09-26 NOTE — ASSESSMENT & PLAN NOTE
Antithrombotics: asa 325  Statins: Lipitor 40 mg daily  Aggressive risk factor modification: Smoking and Carotid Artery disease,   Rehab Efforts: Physical Therapy, Occupational Therapy and Speech and Language Pathology - no needs per therapy teams   Diagnostics: Ordered/Pending HgbA1C to assess blood glucose levels, Lipid Profile to assess cholesterol levels, MRI head without contrast to assess brain parenchyma, Trans-thoracic cardiac echo to assess cardiac function/status, TSH to assess thyroid function   VTE Prophylaxis:SCD's may begin heparin 5000 units sc Q8H

## 2017-09-26 NOTE — ASSESSMENT & PLAN NOTE
Noted on tox screen   Admitted use per patient   Discontinued beta blockers  Follow up with PCP and counseling  Stroke risk factor

## 2017-09-27 ENCOUNTER — PATIENT OUTREACH (OUTPATIENT)
Dept: ADMINISTRATIVE | Facility: CLINIC | Age: 57
End: 2017-09-27

## 2017-09-27 ENCOUNTER — TELEPHONE (OUTPATIENT)
Dept: NEUROSURGERY | Facility: HOSPITAL | Age: 57
End: 2017-09-27

## 2017-09-27 ENCOUNTER — NURSE TRIAGE (OUTPATIENT)
Dept: ADMINISTRATIVE | Facility: CLINIC | Age: 57
End: 2017-09-27

## 2017-09-27 NOTE — TELEPHONE ENCOUNTER
"Attempted to contact patient via number on file.  No answer.  The following message was left for patient to return call "Hello.  This is a message for Mina Jo.  My name is Davion and I am a nurse at [facility].  If you could give me call back at [phone] between the hours of 08:00 AM and 04:00 PM, Monday through Friday.  Thanks so much and have a great day."  Will try again later.   "

## 2017-09-27 NOTE — TELEPHONE ENCOUNTER
Patient returning call.  Spoke with patient.  Risk factors specific to patient for stroke discussed with teach back implemented.  Patient verbalized understanding of discharge instructions and medications.  Patient was asked about discharge appointments and follow up care.  Follow appointment scheduled for 10/17/2017 at 0820 Warning signs discussed with teach back discussion method implemented.  Notified to seek immediate medical help via 911 if new or worsening stroke symptoms occur.  Patient relayed no new questions or comments at this time.  Instructed to call [facility] with any further questions.

## 2017-09-27 NOTE — TELEPHONE ENCOUNTER
Reason for Disposition   Nursing judgment    Protocols used: ST NO PROTOCOL CALL: INFORMATION ONLY-A-[place]    Pt returned a post discharge call. Please contact patient to advise

## 2017-09-28 RX ORDER — VARENICLINE TARTRATE 0.5 (11)-1
KIT ORAL NIGHTLY
COMMUNITY
End: 2018-11-29 | Stop reason: CLARIF

## 2017-09-28 NOTE — PATIENT INSTRUCTIONS
Discharge Instructions for Stroke  You have been diagnosed with a stroke, or with a TIA (transient ischemic attack). Or you have been identified as having a high risk for stroke. During a stroke, blood stops flowing to part of your brain. This can damage areas in the brain that control other parts of the body. Symptoms after a stroke depend on which part of the brain has been affected.  Stroke risk factors  Once youve had a stroke, youre at greater risk for another one. Listed below are some other factors that can increase your risk for a stroke:  · High blood pressure  · High cholesterol  · Cigarette or cigar smoking  · Diabetes  · Carotid or other artery disease  · Atrial fibrillation, atrial flutter, or other heart disease  · Not being physically active  · Obesity  · Certain blood disorders (such as sickle cell anemia)  · Excessive alcohol use  · Abuse of street drugs  · Race  · Gender  · Family history of stroke  · Diet high in salty, fried, or greasy foods  Changes in daily living  Doing your regular tasks may be difficult after youve had a stroke, but you can learn new ways to manage your daily activities. In fact, doing daily activities may help you to regain muscle strength and bring back function to affected limbs. Be patient, give yourself time to adjust, and appreciate the progress you make.  Daily activities  You may be at risk of falling. Make changes to your home to help you walk more easily. A therapist will decide if you need an assistive device to walk safely.  You may need to see an occupational therapist or physical therapist to learn new ways of doing things. For example, you may need to make adjustments when bathing or dressing:  Tips for showering or bathing  · Test the water temperature with a hand or foot that was not affected by the stroke.  · Use grab bars, a shower seat, a hand-held showerhead, and a long-handled brush.  Tips for getting dressed  · Dress while sitting, starting with  the affected side or limb.  · Wear shirts that pull easily over your head. Wear pants or skirts with elastic waistbands.  · Use zippers with loops attached to the pull tabs.  Lifestyle changes  · Take your medicines exactly as directed. Dont skip doses.  · Begin an exercise program. Ask your provider how to get started. Also ask how much activity you should try to get on a daily or weekly basis. You can benefit from simple activities such as walking or gardening.  · Limit alcohol intake. Men should have no more than 2 alcoholic drinks a day. Women should limit themselves to 1 alcoholic drink per day.  · Know your cholesterol level. Follow your providers recommendations about how to keep cholesterol under control.  · If you are a smoker, quit now. Join a stop-smoking program to improve your chances of success. Ask your provider about medicines or other methods to help you quit.  · Learn stress management techniques to help you deal with stress in your home and work life.  Diet  Your healthcare provider will give you information on dietary changes that you may need to make, based on your situation. Your provider may recommend that you see a registered dietitian for help with diet changes. Changes may include:  · Reducing fat and cholesterol intake  · Reducing salt (sodium) intake, especially if you have high blood pressure  · Eating more fresh vegetables and fruits  · Eating more lean proteins, such as fish, poultry, and beans and peas (legumes)  · Eating less red meat and processed meats  · Using low-fat dairy products  · Limiting vegetable oils and nut oils  · Limiting sweets and processed foods such as chips, cookies, and baked goods  Follow-up care  · Keep your medical appointments. Close follow-up is important to stroke rehabilitation and recovery.  · Some medicines require blood tests to check for progress or problems. Keep follow-up appointments for any blood tests ordered by your providers.  When to call  911  Call 911 right away if you have any of the following symptoms of stroke:  · Weakness, tingling, or loss of feeling on one side of your face or body  · Sudden double vision or trouble seeing in one or both eyes  · Sudden trouble talking or slurred speech  · Trouble understanding others  · Sudden, severe headache  · Dizziness, loss of balance, or a sense of falling  · Blackouts or seizures      F.A.S.T. is an easy way to remember the signs of stroke. When you see these signs, you know that you need to call 911 fast.  F.A.S.T. stands for:  · F is for face drooping. One side of the face is drooping or numb. When the person smiles, the smile is uneven.  · A is for arm weakness. One arm is weak or numb. When the person lifts both arms at the same time, one arm may drift downward.  · S is for speech difficulty. You may notice slurred speech or trouble speaking. The person can't repeat a simple sentence correctly when asked.  · T is for time to call 911. If someone shows any of these symptoms, even if they go away, call 911 immediately. Make note of the time the symptoms first appeared.  Date Last Reviewed: 8/26/2015 © 2000-2017 [employer] [address] [place] [address], [place] [address]. All rights reserved. This information is not intended as a substitute for professional medical care. Always follow your healthcare professional's instructions.

## 2017-10-04 PROBLEM — R06.09 DYSPNEA ON EXERTION: Status: RESOLVED | Noted: 2017-06-01 | Resolved: 2017-10-04

## 2017-10-04 PROBLEM — R07.89 ATYPICAL CHEST PAIN: Status: RESOLVED | Noted: 2017-06-01 | Resolved: 2017-10-04

## 2017-10-04 PROBLEM — R07.9 CHEST PAIN: Status: RESOLVED | Noted: 2017-06-01 | Resolved: 2017-10-04

## 2017-10-04 NOTE — PROGRESS NOTES
[facility]  New Visit Progress Note   Recent Hospital Discharge     PRESENTING HISTORY     Chief Complaint/Reason for Visit:  Follow up Hospital Discharge   No chief complaint on file.    PCP: Primary Doctor No    History of Present Illness: Mr. Mina Jo is a 57 y.o. male who was recently admitted to the hospital.    [facility]  Vascular Neurology  Comprehensive Stroke Center  Discharge Summary      Summary:      Admit Date: 9/24/2017 11:33 PM     Discharge Date and Time: 9/26/2017  1:28 PM     Attending Physician: Dr. Hernandez      Discharge Provider: EDER Hill     History of Present Illness: 56 y/o male who was sitting watching TV and at 2300 he all of a sudden could not remember how to use the remote and was dysarthric and aphasic. His wife brought him to the ED, Upon evaluation patient is still aphasic, dysarthria and facial droop. He is anxious because he can not speak right and say what he wants to say. Jay slight left hand weakness. CT scan head with no acute changes. Discussed with Dr Ricks and OK to give IV TPA.   Denies any visual disturbance, sensory deficit, headache  Risk factors CAD, smoker     Hospital Course (synopsis of major diagnoses, care, treatment, and services provided during the course of the hospital stay): 56 y/o male with aphasia, dysarthria and facial droop. Received IV TPA. NO IR as L M3 occlusion and calcified ICA.      Patient with some bradycardia on day of dc- EKG reviewed - sinus luis a, no signs of heart block      Beta blocker discontinued due to admitted cocaine use.   Patient to follow in priority clinic  Stroke symptoms resolved      Inpatient acute stroke work up completed and patient is stable for discharge.  Please see imaging and discharge medication list below.              NIH Stroke Scale:  Interval: 7 days or at discharge (whichever comes first)  Level of Consciousness: 0 - alert  LOC Questions: 0 - answers both correctly  LOC  Commands: 0 - performs both correctly  Best Gaze: 0 - normal  Visual: 0 - no visual loss  Facial Palsy: 0 - normal  Motor Left Arm: 0 - no drift  Motor Right Arm: 0 - no drift  Motor Left Le - no drift  Motor Right Le - no drift  Limb Ataxia: 0 - absent  Sensory: 0 - normal  Best Language: 0 - no aphasia  Dysarthria: 0 - normal articulation  Extinction and Inattention: 0 - no neglect  NIH Stroke Scale Total: 0  Modified Bogdan Scale:   Timeline: At discharge  Modified Bogdan Score: 0 - no symptoms        17 - MRI   1.  Scattered punctate acute infarcts within the left MCA territory involving the left insula, frontal, and anterior parietal lobes.  No hemorrhagic conversion or other acute intracranial process.    2.  Mild chronic microvascular ischemic changes.     17 - CTA stroke   1.  Left M3 occlusion.  Findings identified at 1220 a.m. and discussed with Dr. Hackett with interventional neuroradiology at 1222 a.m.      2. Moderate bilateral atherosclerosis at the carotid bifurcations.  Diffusely small caliber left cervical ICA concerning for hemodynamically significant stenosis, though technically with less than 50% narrowing per NASCET criteria.  Correlation with carotid Doppler recommended.    3.  Significant stenosis of the right vertebral artery involving the proximal V4 segment.    4.  Postoperative changes of sternotomy and coronary artery bypass grafting.    5.  Moderate atherosclerosis of the aortic arch with mild narrowing of left common carotid and left subclavian arteries near their origins.     CT head 17  No acute intracranial pathology.       Echo 17  Normal [place]   CONCLUSIONS     1 - Concentric remodeling.     2 - No wall motion abnormalities.     3 - Normal left ventricular systolic function (EF 60-65%).     4 - Indeterminate LV diastolic function.     5 - Normal right ventricular systolic function .         Assessment/Plan:      Interventions: IV t-PA     Complications:  None     Research Candidate?:  No     Neurological deficit at discharge: None     Disposition: Home or Self Care            Final Active Diagnoses:     Diagnosis Date Noted POA    PRINCIPAL PROBLEM:  Cerebral infarction due to thrombosis of left middle cerebral artery [I63.312] 09/25/2017 Yes    Cocaine abuse [F14.10] 09/26/2017 Unknown    Coronary artery disease involving native coronary artery [I25.10] 09/25/2017 Yes    Aphasia [R47.01] 09/25/2017 Yes    Dysarthria [R47.1] 09/25/2017 Unknown    Cerebrovascular accident (CVA) [I63.9] 09/25/2017 Yes    Essential hypertension [I10] 09/25/2017 Unknown       Problems Resolved During this Admission:     Diagnosis Date Noted Date Resolved POA      * Cerebral infarction due to thrombosis of left middle cerebral artery        Antithrombotics: asa 325  Statins: Lipitor 40 mg daily  Aggressive risk factor modification: Smoking and Carotid Artery disease,   Rehab Efforts: Physical Therapy, Occupational Therapy and Speech and Language Pathology - no needs per therapy teams   Diagnostics: Ordered/Pending HgbA1C to assess blood glucose levels, Lipid Profile to assess cholesterol levels, MRI head without contrast to assess brain parenchyma, Trans-thoracic cardiac echo to assess cardiac function/status, TSH to assess thyroid function   VTE Prophylaxis:SCD's may begin heparin 5000 units sc Q8H        Cocaine abuse     Noted on tox screen   Admitted use per patient   Discontinued beta blockers  Follow up with PCP and counseling  Stroke risk factor        Essential hypertension     Stroke risk factor  Normotensive        Dysarthria     Due to stroke  Aggressive therapy - no speech needs        Aphasia     Due to stroke  Aggressive therapy- improved , no speech needs        Coronary artery disease involving native coronary artery     Stroke risk factor             Recommendations:      Post-discharge complication risks: None     Stroke Education given to: patient     Follow-up  "in Stroke Clinic in 4-6 weeks  PCP in one week      Discharge Plan:  Antithrombotics: Aspirin 325mg  Statin: Atorvastatin 40mg  Aggresive risk factor modification:  Hypertension, High Cholesterol and drug use   Smoking cessation      Follow Up:      Follow-up Information      FAUSTO Smith On 10/5/2017.    Specialty:  Internal Medicine  Why:  appointment with [facility] clinic at 10:00am  Contact information:  [address]  [facility] [address]  [phone]                 Beau Mcmillan MD. Schedule an appointment as soon as possible for a visit on 10/17/2017.    Specialty:  Neurology  Why:  appointment time 8:20am  Contact information:  [address]  [address]  [place] [address]  [phone]                        _______________________________________________    Today:  Mr. Enriquez presents to  today, with complaints of "feeling tired and weak, having to leave office during the day to take a knap. Since discharge, does not endorse chest pain or sob. He is compliant with his medication after reportedly being off for 4 months.  Denies recurrent use of recreational drugs. Reports "only smoking 1 cigarette a day now; but was smoking 2 packs a day before going into the hospital".   Drinks "2 beers a day".   Reports "very stressful job".       Review of Systems:  Eyes: denies visual changes at this time denies floaters   ENT: no nasal congestion or sore throat  Respiratory: no cough or shortness of breath  Cardiovascular: no chest pain or palpitations  Gastrointestinal: no nausea or vomiting, no abdominal pain or change in bowel habits  Genitourinary: no hematuria or dysuria; denies frequency  Hematologic/Lymphatic: no easy bruising or lymphadenopathy  Musculoskeletal: no arthralgias or myalgias  Neurological: no seizures or tremors  Endocrine: no heat or cold intolerance      PAST HISTORY:     Past Medical History:   Diagnosis Date    Coronary artery disease        Past Surgical " History:   Procedure Laterality Date    BYPASS GRAFT      triple bypass 2009    CARDIAC SURGERY      triple bypass x 2    ROTATOR CUFF REPAIR Right        No family history on file.    Social History     Social History    Marital status: Single     Spouse name: N/A    Number of children: N/A    Years of education: N/A     Social History Main Topics    Smoking status: Current Every Day Smoker     Packs/day: 1.50     Years: 20.00     Types: Cigarettes    Smokeless tobacco: Never Used    Alcohol use Yes      Comment: beer daily    Drug use: No    Sexual activity: Not on file     Other Topics Concern    Not on file     Social History Narrative    No narrative on file       MEDICATIONS & ALLERGIES:     Current Outpatient Prescriptions on File Prior to Visit   Medication Sig Dispense Refill    amlodipine (NORVASC) 5 MG tablet Take 1 tablet (5 mg total) by mouth once daily. 30 tablet 1    aspirin 325 MG tablet Take 1 tablet (325 mg total) by mouth once daily. 30 tablet 1    atorvastatin (LIPITOR) 40 MG tablet Take 1 tablet (40 mg total) by mouth once daily. 30 tablet 1    lisinopril (PRINIVIL,ZESTRIL) 5 MG tablet Take 1 tablet (5 mg total) by mouth once daily. 30 tablet 1    nicotine (NICODERM CQ) 21 mg/24 hr Place 1 patch onto the skin once daily. Use for 6 weeks before stepping down to lower dosage patch.  0    sertraline (ZOLOFT) 25 MG tablet Take 1 tablet (25 mg total) by mouth once daily. 30 tablet 1    varenicline (CHANTIX BLAS) 0.5 mg (11)- 1 mg (42) tablet Take by mouth once daily. Take one 0.5mg tab by mouth once daily X3 days,then increase to one 0.5mg tab twice daily X4 days,then increase to one 1mg tab twice daily       No current facility-administered medications on file prior to visit.         Review of patient's allergies indicates:  No Known Allergies    OBJECTIVE:     Vital Signs:  There were no vitals filed for this visit.  Wt Readings from Last 1 Encounters:   09/26/17 0400 74.8 kg  (164 lb 14.5 oz)   09/25/17 1905 74.8 kg (164 lb 14.5 oz)   09/25/17 0700 74.8 kg (164 lb 14.5 oz)   09/25/17 0212 74.8 kg (164 lb 14.5 oz)   09/24/17 2329 83.9 kg (185 lb)     There is no height or weight on file to calculate BMI.   Wt Readings from Last 3 Encounters:   10/05/17 75.3 kg (166 lb 0.1 oz)   09/26/17 74.8 kg (164 lb 14.5 oz)   06/01/17 74.8 kg (165 lb)     Temp Readings from Last 3 Encounters:   09/26/17 98.4 °F (36.9 °C) (Oral)   06/02/17 98.5 °F (36.9 °C)   04/27/17 97.6 °F (36.4 °C) (Oral)     BP Readings from Last 3 Encounters:   10/05/17 (!) 102/58   09/26/17 127/67   06/02/17 124/75     Pulse Readings from Last 3 Encounters:   10/05/17 85   09/26/17 (!) 50   06/02/17 (!) 57       Physical Exam:  General: Well developed, well nourished. No distress.  HEENT: Head is normocephalic, atraumatic; ears are normal.   Eyes: Clear conjunctiva.  Neck: Supple, symmetrical neck; trachea midline. No JVD  Lungs: Clear to auscultation bilaterally and normal respiratory effort.  Cardiovascular: Heart with regular rate and rhythm. No murmurs, gallops or rubs  Extremities: No LE edema. Pulses 2+ and symmetric.   Abdomen: Abdomen is soft, non-tender non-distended with normal bowel sounds.  Skin: Skin color, texture, turgor normal. No rashes.  Musculoskeletal: Normal gait.   Lymph Nodes: No cervical or supraclavicular adenopathy.  Neurologic: Normal strength and tone. No focal numbness or weakness.   Psychiatric: Not depressed.        Laboratory  Lab Results   Component Value Date    WBC 7.54 09/26/2017    HGB 15.9 09/26/2017    HCT 45.7 09/26/2017    MCV 94 09/26/2017     09/26/2017     BMP  Lab Results   Component Value Date     09/26/2017    K 3.8 09/26/2017     09/26/2017    CO2 23 09/26/2017    BUN 12 09/26/2017    CREATININE 0.7 09/26/2017    CALCIUM 8.7 09/26/2017    ANIONGAP 10 09/26/2017    ESTGFRAFRICA >60.0 09/26/2017    EGFRNONAA >60.0 09/26/2017     Lab Results   Component Value Date     ALT 21 09/26/2017    AST 22 09/26/2017    ALKPHOS 93 09/26/2017    BILITOT 0.6 09/26/2017     Lab Results   Component Value Date    INR 0.9 09/25/2017    INR 1.0 09/01/2011    INR 1.0 03/04/2010     Lab Results   Component Value Date    HGBA1C 5.2 09/25/2017     No results for input(s): POCTGLUCOSE in the last 72 hours.    Diagnostic Results:    9/26:   MRI: + acute Left MCA-CVA  MRI brain without contrast    09/26/17 00:05:39    Accession# [id number]    CLINICAL INDICATION: 57 year old M with stroke eval      TECHNIQUE: Multiplanar multisequence MR imaging of the brain was performed without the use of intravenous contrast.    COMPARISON: CT multiphase 9/25/17, brain MRI 9/8/09.    FINDINGS:  The ventricles are normal in size and configuration without evidence of hydrocephalus.    Punctate foci diffusion restriction with associated edema signal within the left posterior, left frontal lobe, and left insula compatible with acute infarction.  No significant mass effect or midline shift.  No additional diffusion restriction.    No intraparenchymal hemorrhage or hemorrhagic conversion.  The brain demonstrates mild scattered punctate T2/FLAIR hyperintense foci are at the supratentorial white matter, nonspecific but compatible with chronic microvascular ischemic change.    No parenchymal mass.    No extra-axial blood or fluid collections.    The T2 skull base flow voids are preserved. Bone marrow signal intensity is unremarkable.  The visualized orbits and extracranial soft tissues are unremarkable.  The paranasal sinuses and mastoid air cells are clear.   Impression       1.  Scattered punctate acute infarcts within the left MCA territory involving the left insula, frontal, and anterior parietal lobes.  No hemorrhagic conversion or other acute intracranial process.    2.  Mild chronic microvascular ischemic changes.    Report has been flagged in the EPIC medical record system.  ______________________________________      Electronically signed by resident: TERRANCE KNUTSON MD  Date: 09/26/17  Time: 00:32            As the supervising and teaching physician, I personally reviewed the images and resident's interpretation and I agree with the findings.          Electronically signed by: JEANA DEVINE MD  Date: 09/26/17  Time: 00:55        9/25  2 Decho: 60%, - DD  Date of Procedure: 09/26/2017        TEST DESCRIPTION   Technical Quality: This is a portable study performed at the patient's bedside. This is a technically adequate study.     Aorta: The aortic root is normal in size, measuring 2.8 cm at sinotubular junction and 2.9 cm at Sinuses of Valsalva. The proximal ascending aorta is normal in size, measuring 2.9 cm across.     Left Atrium: The left atrial volume index is normal, measuring 34.48 cc/m2.     Left Ventricle: The left ventricle is normal in size, with an end-diastolic diameter of 3.8 cm, and an end-systolic diameter of 3.0 cm. Wall thickness is mildly increased, with the septum and the posterior wall each measuring 1.2 cm across. Relative wall   thickness was increased at 0.63, and the LV mass index was 91.3 g/m2 consistent with concentric remodeling. There are no regional wall motion abnormalities. Left ventricular systolic function appears normal. Visually estimated ejection fraction is   60-65%. The LV Doppler derived stroke volume equals 65.0 ccs.     Diastolic indices: E wave velocity 0.7 m/s, E/A ratio 1.0,  msec., E/e' ratio(avg) 8. Diastolic function is indeterminate.     Right Atrium: The right atrium is normal in size, measuring 4.3 cm in length and 3.4 cm in width in the apical view.     Right Ventricle: The right ventricle is normal in size measuring 3.6 cm at the base in the apical right ventricle-focused view. Global right ventricular systolic function appears normal. Tricuspid annular plane systolic excursion (TAPSE) is 1.8 cm.     Aortic Valve:  Aortic valve is normal in structure with normal leaflet  mobility.     Mitral Valve:  Mitral valve is normal in structure with normal leaflet mobility.     Tricuspid Valve:  Tricuspid valve is normal in structure with normal leaflet mobility.     Pulmonary Valve:  Pulmonary valve is normal in structure with normal leaflet mobility.     IVC: IVC is normal in size and collapses > 50% with a sniff, suggesting normal right atrial pressure of 3 mmHg.     Intracavitary: There is no evidence of pericardial effusion, intracavity mass, thrombi, or vegetation.         CONCLUSIONS     1 - Concentric remodeling.     2 - No wall motion abnormalities.     3 - Normal left ventricular systolic function (EF 60-65%).     4 - Indeterminate LV diastolic function.     5 - Normal right ventricular systolic function .             This document has been electronically    SIGNED BY: Nani Stephens MD On: 09/26/2017 10:53      Specimen Collected: 09/26/17 08:00 Last Resulted: 09/26/17 10:58          9/24  CT Head: -      TECHNIQUE: CTA of the head and neck with multiphase imaging of the head per the stroke protocol was performed after administration of 100 cc of Omnipaque 350 intravenous contrast.  Axial, coronal, and sagittal reformations provided.    INDICATION: Aphasia.    COMPARISON: Noncontrast head CT 9/24/17.    FINDINGS:    Vascular:    There is a left-sided aortic arch with 3 branch vessels.    There is moderate soft plaque involving the aortic arch.  Postoperative changes of coronary artery bypass with LIMA graft.  There is moderate calcific atherosclerosis involving the left common and left subclavian arteries.    The brachiocephalic, subclavian, and common carotid arteries are patent.  There is mild narrowing of the left common carotid and left subclavian arteries at their origins without high grade stenosis.    There is moderate soft and atheromatous plaque at both carotid bifurcations with less than 50% narrowing of the ICAs bilaterally by NASCET criteria, noting that the left  cervical ICA is diffusely small in caliber relative to the contralateral side.    There is mild calcific atherosclerosis of the cavernous segments of the ICAs.    The ACAs, MCA, and left M1 and M2 segments of the left middle cerebral artery are patent.  There is focal occlusion of a proximal left M3 branch involving the posterior division.  Collateral vessels difficult to assess on delayed imaging secondary to the small size of the affected territory.    The vertebral arteries are patent.  There is mild focal narrowing at the origin of the left vertebral artery. The left vertebral artery is dominant.  There is mild multifocal atherosclerotic sclerosis of the right vertebral artery throughout the neck without high-grade narrowing.  There is focal narrowing within the proximal aspect of the intracranial right V4 segment with decreased caliber of the distal right vertebral artery concerning for hemodynamically significant stenosis.  The intracranial left V4 segment demonstrates mild calcific atherosclerosis without high-grade stenosis.    The basilar artery is patent without significant stenosis.  The superior cerebellar and posterior cerebral arteries are patent without evidence of stenosis.    No intracranial aneurysm.  The dural venous sinuses are patent.    Upper chest and neck:    The lung apices demonstrate mild bilateral scarring, greater on the left and are otherwise clear.  The thyroid gland, submandibular glands, and parotid glands are unremarkable.  No significant cervical lymphadenopathy.  Postoperative changes of sternotomy partially visualized.  Visualized osseous structures demonstrate age-appropriate degenerative changes without evidence of fracture or aggressive lesion.   Impression         1.  Left M3 occlusion.  Findings identified at 1220 a.m. and discussed with Dr. Hackett with interventional neuroradiology at 1222 a.m.      2. Moderate bilateral atherosclerosis at the carotid bifurcations.   Diffusely small caliber left cervical ICA concerning for hemodynamically significant stenosis, though technically with less than 50% narrowing per NASCET criteria.  Correlation with carotid Doppler recommended.    3.  Significant stenosis of the right vertebral artery involving the proximal V4 segment.    4.  Postoperative changes of sternotomy and coronary artery bypass grafting.    5.  Moderate atherosclerosis of the aortic arch with mild narrowing of left common carotid and left subclavian arteries near their origins.        ______________________________________     Electronically signed by resident: TERRANCE KNUTSON MD  Date: 09/25/17  Time: 01:14            As the supervising and teaching physician, I personally reviewed the images and resident's interpretation and I agree with the findings.          Electronically signed by: Romero Rinaldi  Date: 09/25/17  Time: 01:30          TRANSITION OF CARE:     [facility] Contact Note:  9/27/2017    Family and/or Caretaker present at visit?  No.  Diagnostic tests reviewed/disposition: I have reviewed all completed as well as pending diagnostic tests at the time of discharge.  Disease/illness education:  CVA  HTN, CAD / MI, Recreational Drug use and effects, Meds  Home health/community services discussion/referrals: Patient does not have home health established from hospital visit.  They do not need home health.  If needed, we will set up home health for the patient.   Establishment or re-establishment of referral orders for community resources: No other necessary community resources.   Discussion with other health care providers: No discussion with other health care providers necessary.     Medications Reconciliation:   I have reconciled the patient's home medications and discharge medications with the patient/family. I have updated all changes.  Refer to After-Visit Medication List.    ASSESSMENT & PLAN:     HIGH RISK CONDITION(S):      Recent Admission for  "CVA:  *s/p TPA in ED  - continue   - continue Statin 40 daily   - follow up with Vascular Neuro 10/17  *Maintain optimal BP control, however, the current regimen may be too aggressive as he is having symptomatic hypotensive events. Will check labs to ascertain no other attributable etiololgy.       Hypertension, but having symptomatic Hypotension:   *Of note, his BP range during most recent admission and on admit was 112-140s / 50-70s; he was prescribed Lopressor at an outside facility, given his history of  "heart disease and CABG x2",which he stopped taking on his own ~4 months ago; however, given the + tox screen for Cocaine on this admission, he was not prescribed beta blocker therapy at discharge.   Goal:  > 110/60 < 150/90  Today: 102/58  - stop Norvasc    - continue Lisinopril 5 mg   *Current regimen may be too aggressive as he is having symptomatic hypotensive events. Will check labs to ascertain no other attributable etiololgy.   - check CMP  - check CBC  - to PC tomorrow for nurse visit to have BP checked  Goal: > 110/60 < 150/90  *Most recent A1c and TSH are not reflective of concern for underlying pathology.       History of Heart Disease / CAD / CABG x 2:   *Strong family history   *2-D echo normal   ? H/o MI (patient unsure; have suggested that he obtains records from prior Cardiologist)  *Stopped all medications 4 months ago; established with Cardiology at [facility] [place] he reports, but "stopped going".   - continue ASA  - continue Statin therapy  - continue Low Dose Ace I therapy (Lisinopril)  Lab Results   Component Value Date    CHOL 230 (H) 09/25/2017    CHOL 235 (H) 09/25/2017    CHOL 217 (H) 09/02/2011     Lab Results   Component Value Date    HDL 36 (L) 09/25/2017    HDL 38 (L) 09/25/2017    HDL 30 (L) 09/02/2011     Lab Results   Component Value Date    LDLCALC 126.4 09/25/2017    LDLCALC 133.8 09/25/2017    LDLCALC 128.6 09/02/2011     Lab Results   Component Value Date    TRIG 338 (H) " "09/25/2017    TRIG 316 (H) 09/25/2017    TRIG 292 (H) 09/02/2011     Lab Results   Component Value Date    CHOLHDL 15.7 (L) 09/25/2017    CHOLHDL 16.2 (L) 09/25/2017    CHOLHDL 13.8 (L) 09/02/2011       Situational Depression 2/2 "stressful" Job:   Continue the Sertraline therapy at 25 mg for now; have explained to the patient that if may take 4 -6 weeks before the full efficacy of this medication is reached.       Nicotine Dependence:   Smoking cessation education, which will undoubtedly aid in further advancement of heart disease and blood pressure control.   - completing Chantix  *Reports, "it's working; only smoking 1 cigarette a day versus 2 packs prior the admission".      Immunizations:   Flu Vaccine: today      Instructions for the patient:  1) Nurse Visit on 10/6 for blood pressure check in same clinic you reported to today.  Goal: < 150/90 > 110/60    2) Stop the Norvasc due to symptomatic low blood pressures.    3) Continue the low dose Lisinopril; may need up titration of this medication    4) Will contact with results of labs drawn in clinic today.    5) Flu site may be sore; apply cool compresses to site and may take Tylenol 1 gm every 8 hours as needed for discomfort.       Priority Clinic Visit (Post Discharge Follow-up) Today:   - Our clinic physicians and nurses plan to follow the patient up for any medical issues in the Priority Clinic for 30 days post discharge.      Future Appointments  Date Time Provider Department Center   10/5/2017 11:00 AM LAB, SAME DAY [facility] INTMED [facility] LAB IM [address] [facility]   10/5/2017 5:00 PM FLU, INTERNAL MEDICINE [facility] IM [address] [facility]   10/6/2017 9:00 AM NURSE, [facility] INTERNAL MEDICINE [facility] IM [address] [facility]   10/10/2017 3:00 PM FAUSTO Ordaz [facility] IMPRICL [address] [facility]   10/17/2017 8:20 AM Beau Mcmillan MD [facility] NEURO [place] [facility]   11/3/2017 8:00 AM Pradip Zimmerman MD [facility] CARDIO [address]   12/11/2017 1:20 PM Andree Davis MD [facility] IM [address] [facility] "          Medication List          Accurate as of 10/5/17 10:49 AM. If you have any questions, ask your nurse or doctor.               CHANGE how you take these medications    atorvastatin 40 MG tablet  Commonly known as:  LIPITOR  Take 1 tablet (40 mg total) by mouth every evening.  What changed:  when to take this  Changed by:  FAUSTO Smith     sertraline 25 MG tablet  Commonly known as:  ZOLOFT  Take 1 tablet (25 mg total) by mouth every evening.  What changed:  when to take this  Changed by:  FAUSTO Smith        CONTINUE taking these medications    aspirin 325 MG tablet  Take 1 tablet (325 mg total) by mouth once daily.     lisinopril 5 MG tablet  Commonly known as:  PRINIVIL,ZESTRIL  Take 1 tablet (5 mg total) by mouth once daily.     varenicline 0.5 mg (11)- 1 mg (42) tablet  Commonly known as:  CHANTIX BLAS        STOP taking these medications    amlodipine 5 MG tablet  Commonly known as:  NORVASC  Stopped by:  FAUSTO Smith     nicotine 21 mg/24 hr  Commonly known as:  NICODERM CQ  Stopped by:  AFUSTO Smith           Where to Get Your Medications      Information about where to get these medications is not yet available    Ask your nurse or doctor about these medications  · atorvastatin 40 MG tablet  · sertraline 25 MG tablet       Signing Physician:  FAUSTO Smith

## 2017-10-05 ENCOUNTER — OFFICE VISIT (OUTPATIENT)
Dept: PRIMARY CARE CLINIC | Facility: CLINIC | Age: 57
End: 2017-10-05
Payer: COMMERCIAL

## 2017-10-05 ENCOUNTER — TELEPHONE (OUTPATIENT)
Dept: INTERNAL MEDICINE | Facility: CLINIC | Age: 57
End: 2017-10-05

## 2017-10-05 VITALS
BODY MASS INDEX: 22.48 KG/M2 | SYSTOLIC BLOOD PRESSURE: 102 MMHG | WEIGHT: 166 LBS | HEART RATE: 85 BPM | OXYGEN SATURATION: 96 % | DIASTOLIC BLOOD PRESSURE: 58 MMHG | HEIGHT: 72 IN

## 2017-10-05 DIAGNOSIS — I10 ESSENTIAL HYPERTENSION: ICD-10-CM

## 2017-10-05 DIAGNOSIS — Z95.1 HX OF CABG: ICD-10-CM

## 2017-10-05 DIAGNOSIS — I63.9 CEREBROVASCULAR ACCIDENT (CVA), UNSPECIFIED MECHANISM: Primary | ICD-10-CM

## 2017-10-05 DIAGNOSIS — Z56.6 STRESSFUL JOB: ICD-10-CM

## 2017-10-05 DIAGNOSIS — I63.312 CEREBRAL INFARCTION DUE TO THROMBOSIS OF LEFT MIDDLE CEREBRAL ARTERY: ICD-10-CM

## 2017-10-05 DIAGNOSIS — F32.A DEPRESSION, UNSPECIFIED DEPRESSION TYPE: ICD-10-CM

## 2017-10-05 DIAGNOSIS — I25.10 CORONARY ARTERY DISEASE, ANGINA PRESENCE UNSPECIFIED, UNSPECIFIED VESSEL OR LESION TYPE, UNSPECIFIED WHETHER NATIVE OR TRANSPLANTED HEART: ICD-10-CM

## 2017-10-05 DIAGNOSIS — I95.9 HYPOTENSION, UNSPECIFIED HYPOTENSION TYPE: ICD-10-CM

## 2017-10-05 DIAGNOSIS — F17.211 CIGARETTE NICOTINE DEPENDENCE IN REMISSION: ICD-10-CM

## 2017-10-05 PROBLEM — M12.811 ROTATOR CUFF ARTHROPATHY, RIGHT: Status: ACTIVE | Noted: 2017-10-05

## 2017-10-05 PROCEDURE — 90471 IMMUNIZATION ADMIN: CPT | Mod: S$GLB,,, | Performed by: NURSE PRACTITIONER

## 2017-10-05 PROCEDURE — 99999 PR PBB SHADOW E&M-EST. PATIENT-LVL IV: CPT | Mod: PBBFAC,,, | Performed by: NURSE PRACTITIONER

## 2017-10-05 PROCEDURE — 99495 TRANSJ CARE MGMT MOD F2F 14D: CPT | Mod: S$GLB,,, | Performed by: NURSE PRACTITIONER

## 2017-10-05 PROCEDURE — 90686 IIV4 VACC NO PRSV 0.5 ML IM: CPT | Mod: S$GLB,,, | Performed by: NURSE PRACTITIONER

## 2017-10-05 RX ORDER — ATORVASTATIN CALCIUM 40 MG/1
40 TABLET, FILM COATED ORAL NIGHTLY
Qty: 30 TABLET | Refills: 1
Start: 2017-10-05 | End: 2017-11-03 | Stop reason: SDUPTHER

## 2017-10-05 RX ORDER — SERTRALINE HYDROCHLORIDE 25 MG/1
25 TABLET, FILM COATED ORAL NIGHTLY
Qty: 30 TABLET | Refills: 1
Start: 2017-10-05 | End: 2018-11-29

## 2017-10-05 SDOH — SOCIAL DETERMINANTS OF HEALTH (SDOH): OTHER PHYSICAL AND MENTAL STRAIN RELATED TO WORK: Z56.6

## 2017-10-05 NOTE — Clinical Note
Priority Clinic Visit (Post Discharge Follow-up) Today:  - Our clinic physicians and nurses plan to follow the patient up for any medical issues in the Priority Clinic for 30 days post discharge.  Future Appointments: 10/5/2017  5:00 PM    FLU, INTERNAL MEDICINE     [facility] IM        [address] [facility]  10/6/2017  9:00 AM    NURSE, [facility] INTERNAL MEDI* [facility]        [address] [facility]  10/10/2017 3:00 PM    Jaelyn Mai, * [facility] IMPRICL   [address] [facility]  10/17/2017 8:20 AM    Beau Mcmillan MD      [facility] NEURO     [place] [facility]  11/3/2017  8:00 AM    Pradip Zimmerman MD        [facility] CARDIO    [address] 12/11/2017 1:20 PM    Andree Davis MD    [facility]        [address] [facility]  Very nice gentleman, scheduled to establish care with you in the near future.  Thanks,  Jaelyn

## 2017-10-05 NOTE — TELEPHONE ENCOUNTER
Lab Results   Component Value Date    WBC 9.22 10/05/2017    HGB 14.8 10/05/2017    HCT 42.8 10/05/2017    MCV 92 10/05/2017     10/05/2017     BMP  Lab Results   Component Value Date     10/05/2017    K 4.2 10/05/2017     10/05/2017    CO2 22 (L) 10/05/2017    BUN 13 10/05/2017    CREATININE 0.7 10/05/2017    CALCIUM 9.1 10/05/2017    ANIONGAP 11 10/05/2017    ESTGFRAFRICA >60 10/05/2017    EGFRNONAA >60 10/05/2017     H and H: wnl  Renal status: wnl  Electrolytes: wnl    Suspicion remains that his current sxs are in association with hypotension 2/2 medications.   ` Nurse visit on tomorrow (10/6) for repeat BP check.   ` Stop the Norvasc     Have shared the same with the patient.     CECILIA

## 2017-10-05 NOTE — PATIENT INSTRUCTIONS
1) Nurse Visit on 10/6 for blood pressure check in same clinic you reported to today.  Goal: < 150/90 > 110/60    2) Stop the Norvasc due to symptomatic low blood pressures.    3) Continue the low dose Lisinopril; may need up titration of this medication    4) Will contact with results of labs drawn in clinic today.    5) Flu site may be sore; apply cool compresses to site and may take Tylenol 1 gm every 8 hours as needed for discomfort.         Priority Clinic Visit (Post Discharge Follow-up) Today:   - Our clinic physicians and nurses plan to follow the patient up for any medical issues in the Priority Clinic for 30 days post discharge.    Future Appointments  Date Time Provider Department Center   10/17/2017 8:20 AM Beau Mcmillan MD [facility] NEURO [place] [facility]   12/11/2017 1:20 PM Andree Davis MD [facility] [address]

## 2017-10-06 ENCOUNTER — CLINICAL SUPPORT (OUTPATIENT)
Dept: INTERNAL MEDICINE | Facility: CLINIC | Age: 57
End: 2017-10-06
Payer: COMMERCIAL

## 2017-10-06 VITALS — SYSTOLIC BLOOD PRESSURE: 126 MMHG | DIASTOLIC BLOOD PRESSURE: 62 MMHG

## 2017-10-10 ENCOUNTER — OFFICE VISIT (OUTPATIENT)
Dept: PRIMARY CARE CLINIC | Facility: CLINIC | Age: 57
End: 2017-10-10
Payer: COMMERCIAL

## 2017-10-10 VITALS
BODY MASS INDEX: 22.6 KG/M2 | DIASTOLIC BLOOD PRESSURE: 64 MMHG | HEIGHT: 72 IN | HEART RATE: 85 BPM | OXYGEN SATURATION: 95 % | WEIGHT: 166.88 LBS | SYSTOLIC BLOOD PRESSURE: 108 MMHG

## 2017-10-10 DIAGNOSIS — I63.9 CEREBROVASCULAR ACCIDENT (CVA), UNSPECIFIED MECHANISM: ICD-10-CM

## 2017-10-10 DIAGNOSIS — I10 ESSENTIAL HYPERTENSION: Primary | ICD-10-CM

## 2017-10-10 PROCEDURE — 99999 PR PBB SHADOW E&M-EST. PATIENT-LVL IV: CPT | Mod: PBBFAC,,, | Performed by: NURSE PRACTITIONER

## 2017-10-10 PROCEDURE — 99213 OFFICE O/P EST LOW 20 MIN: CPT | Mod: S$GLB,,, | Performed by: NURSE PRACTITIONER

## 2017-10-10 NOTE — PROGRESS NOTES
"PRIORITY CLINIC  Follow-up Visit Progress Note     PRESENTING HISTORY     PCP: Primary Doctor No  Chief Complaint/Reason for Visit:  Follow up from recent visit.      No chief complaint on file.      History of Present Illness & ROS: Mr. Mina Jo is a 57 y.o. male.      Prior Clinic Visit:   Mr. Enriquez presents to  today, with complaints of "feeling tired and weak, having to leave office during the day to take a knap. Since discharge, does not endorse chest pain or sob. He is compliant with his medication after reportedly being off for 4 months.  Denies recurrent use of recreational drugs. Reports "only smoking 1 cigarette a day now; but was smoking 2 packs a day before going into the hospital".   Drinks "2 beers a day".   Reports "very stressful job".      Today:   Here for 2nd hospital follow up. No complaints. Stopped the Norvasc and taking Lisinopril 5 mg daily.   No new or acute issues. Does not endorse headaches or dizziness.   States, "feel a whole lot better with only 1 blood pressure pill; still get tired by the end of the day, but not as tired as before".     Review of Systems:  Eyes: denies visual changes at this time denies floaters   ENT: no nasal congestion or sore throat  Respiratory: no cough or shorness of breath  Cardiovascular: no chest pain or palpitations  Gastrointestinal: no nausea or vomiting, no abdominal pain or change in bowel habits  Genitourinary: no hematuria or dysuria; denies frequency  Hematologic/Lymphatic: no easy bruising or lymphadenopathy  Musculoskeletal: no arthralgias or myalgias  Neurological: no seizures or tremors  Endocrine: no heat or cold intolerance      PAST HISTORY:     Past Medical History:   Diagnosis Date    Coronary artery disease        Past Surgical History:   Procedure Laterality Date    BYPASS GRAFT      triple bypass 2009    CARDIAC SURGERY      triple bypass x 2    ROTATOR CUFF REPAIR Right        Family History   Problem Relation Age of " Onset    No Known Problems Mother      DM II    No Known Problems Father      Heart Disease, MI    No Known Problems Brother      Heart Disease, DM II       Social History     Social History    Marital status: Single     Spouse name: N/A    Number of children: N/A    Years of education: N/A     Social History Main Topics    Smoking status: Current Every Day Smoker     Packs/day: 1.50     Years: 20.00     Types: Cigarettes    Smokeless tobacco: Never Used    Alcohol use Yes      Comment: beer daily    Drug use: No    Sexual activity: Not on file     Other Topics Concern    Not on file     Social History Narrative    No narrative on file       MEDICATIONS & ALLERGIES:     Current Outpatient Prescriptions on File Prior to Visit   Medication Sig Dispense Refill    aspirin 325 MG tablet Take 1 tablet (325 mg total) by mouth once daily. 30 tablet 1    atorvastatin (LIPITOR) 40 MG tablet Take 1 tablet (40 mg total) by mouth every evening. 30 tablet 1    lisinopril (PRINIVIL,ZESTRIL) 5 MG tablet Take 1 tablet (5 mg total) by mouth once daily. 30 tablet 1    sertraline (ZOLOFT) 25 MG tablet Take 1 tablet (25 mg total) by mouth every evening. 30 tablet 1    varenicline (CHANTIX BLAS) 0.5 mg (11)- 1 mg (42) tablet Take by mouth once daily. Take one 0.5mg tab by mouth once daily X3 days,then increase to one 0.5mg tab twice daily X4 days,then increase to one 1mg tab twice daily       No current facility-administered medications on file prior to visit.         Review of patient's allergies indicates:  No Known Allergies    Medications Reconciliation:   I have reconciled the patient's home medications and discharge medications with the patient/family. I have updated all changes.  Refer to After-Visit Medication List.    OBJECTIVE:     Vital Signs:  There were no vitals filed for this visit.  Wt Readings from Last 1 Encounters:   10/05/17 0955 75.3 kg (166 lb 0.1 oz)     There is no height or weight on file to  calculate BMI.   Wt Readings from Last 3 Encounters:   10/10/17 75.7 kg (166 lb 14.2 oz)   10/05/17 75.3 kg (166 lb 0.1 oz)   09/26/17 74.8 kg (164 lb 14.5 oz)     Temp Readings from Last 3 Encounters:   09/26/17 98.4 °F (36.9 °C) (Oral)   06/02/17 98.5 °F (36.9 °C)   04/27/17 97.6 °F (36.4 °C) (Oral)     BP Readings from Last 3 Encounters:   10/10/17 112/62   10/06/17 126/62   10/05/17 (!) 102/58     Pulse Readings from Last 3 Encounters:   10/10/17 76   10/05/17 85   09/26/17 (!) 50         Physical Exam:  General: Well developed, well nourished. No distress.  HEENT: Head is normocephalic, atraumatic; ears are normal.   Eyes: Clear conjunctiva.  Neck: Supple, symmetrical neck; trachea midline. No JVD  Lungs: Clear to auscultation bilaterally and normal respiratory effort.  Cardiovascular: Heart with regular rate and rhythm. No murmurs, gallops or rubs  Extremities: No LE edema. Pulses 2+ and symmetric.   Abdomen: Abdomen is soft, non-tender non-distended with normal bowel sounds.  Skin: Skin color, texture, turgor normal. No rashes.  Musculoskeletal: Normal gait.   Lymph Nodes: No cervical or supraclavicular adenopathy.  Neurologic: Normal strength and tone. No focal numbness or weakness.   Psychiatric: Not depressed.        Laboratory  Lab Results   Component Value Date    WBC 9.22 10/05/2017    HGB 14.8 10/05/2017    HCT 42.8 10/05/2017     10/05/2017    CHOL 230 (H) 09/25/2017    TRIG 338 (H) 09/25/2017    HDL 36 (L) 09/25/2017    ALT 24 10/05/2017    AST 22 10/05/2017     10/05/2017    K 4.2 10/05/2017     10/05/2017    CREATININE 0.7 10/05/2017    BUN 13 10/05/2017    CO2 22 (L) 10/05/2017    TSH 2.389 09/25/2017    INR 0.9 09/25/2017    HGBA1C 5.2 09/25/2017         ASSESSMENT & PLAN:     HIGH RISK CONDITION(S):        Essential hypertension:  (Better control, without the Norvasc, and only monotherapy)  Have made changes in most recent anti hypertensive regimen, due to symptomatic  "hypotensive events.   Since the change, *Pt came in for nurse visit to complete a BP check on 10/6; Bp 126/62. Left arm. Sitting. Manual.  Today: 112/62 (sitting) /  108/64 (standing); asymptomatic  - continue low dose Lisinopril 5 mg   - discontinue Norvasc         Cerebrovascular accident (CVA), unspecified mechanism:  - continue low dose Ace I   - follow up with Neuro on 10/17        History of Heart Disease / CAD / CABG x 2:   *Strong family history   *2-D echo normal .   - continue ASA  - continue Statin therapy  - continue Low Dose Ace I therapy (Lisinopril)  - follow up with Cards on 11/3      Situational Depression 2/2 "stressful" Job:   Continue the Sertraline therapy at 25 mg for now; have explained to the patient that if may take 4 -6 weeks before the full efficacy of this medication is reached.       Nicotine Dependence:   Smoking cessation education, which will undoubtedly aid in further advancement of heart disease and blood pressure control.   - completing Chantix on Friday (10/13)         Priority Clinic Visit (Post Discharge Follow-up) Today:   - Our clinic physicians and nurses plan to follow the patient up for any medical issues in the Priority Clinic for 30 days post discharge.      Future Appointments  Date Time Provider Department Center   10/17/2017 8:20 AM Beau Mcmillan MD [facility] NEURO [place] [facility]   11/3/2017 8:00 AM Pradip Zimmerman MD [facility] CARDIO [address]   12/11/2017 1:20 PM Andree Davis MD [facility] IM [address] [facility]          Medication List          Accurate as of 10/10/17  3:03 PM. If you have any questions, ask your nurse or doctor.               CONTINUE taking these medications    aspirin 325 MG tablet  Take 1 tablet (325 mg total) by mouth once daily.     atorvastatin 40 MG tablet  Commonly known as:  LIPITOR  Take 1 tablet (40 mg total) by mouth every evening.     lisinopril 5 MG tablet  Commonly known as:  PRINIVIL,ZESTRIL  Take 1 tablet (5 mg total) by mouth once daily.   "   sertraline 25 MG tablet  Commonly known as:  ZOLOFT  Take 1 tablet (25 mg total) by mouth every evening.     varenicline 0.5 mg (11)- 1 mg (42) tablet  Commonly known as:  CHANTIX BLAS            Signing Physician:  FAUSTO Smith

## 2017-10-10 NOTE — Clinical Note
Priority Clinic Visit (Post Discharge Follow-up) Today:  - Our clinic physicians and nurses plan to follow the patient up for any medical issues in the Priority Clinic for 30 days post discharge.  Future Appointments: 10/17/2017 8:20 AM    Beau Mcmillan MD      [facility] NEURO     [place] [facility]  11/3/2017  8:00 AM    Pradip Zimmerman MD        [facility] CARDIO    [address] [address] 12/11/2017 1:20 PM    Andree Davis MD    [facility]        [address] [facility]  Just an update.. He is scheduled to establish care with you in December.  CECILIA

## 2017-10-17 ENCOUNTER — OFFICE VISIT (OUTPATIENT)
Dept: NEUROLOGY | Facility: CLINIC | Age: 57
End: 2017-10-17
Payer: COMMERCIAL

## 2017-10-17 VITALS
HEIGHT: 72 IN | SYSTOLIC BLOOD PRESSURE: 122 MMHG | WEIGHT: 165.81 LBS | DIASTOLIC BLOOD PRESSURE: 72 MMHG | BODY MASS INDEX: 22.46 KG/M2 | HEART RATE: 74 BPM

## 2017-10-17 DIAGNOSIS — I63.412 CEREBROVASCULAR ACCIDENT (CVA) DUE TO EMBOLISM OF LEFT MIDDLE CEREBRAL ARTERY: Primary | ICD-10-CM

## 2017-10-17 PROCEDURE — 99214 OFFICE O/P EST MOD 30 MIN: CPT | Mod: S$GLB,,, | Performed by: PSYCHIATRY & NEUROLOGY

## 2017-10-17 PROCEDURE — 99999 PR PBB SHADOW E&M-EST. PATIENT-LVL III: CPT | Mod: PBBFAC,,, | Performed by: PSYCHIATRY & NEUROLOGY

## 2017-10-17 RX ORDER — CLOPIDOGREL BISULFATE 75 MG/1
75 TABLET ORAL DAILY
Qty: 30 TABLET | Refills: 11 | Status: SHIPPED | OUTPATIENT
Start: 2017-10-17 | End: 2017-11-03 | Stop reason: SDUPTHER

## 2017-10-17 NOTE — PROGRESS NOTES
"  Mina Jo is a 57 y.o. year old male that presents for stroke follow up.  Chief Complaint   Patient presents with    Stroke     follow up   .     HPI:  I had the pleasure of seeing Mr Mina Jo in follow up today.    Patient is known to our neurovascular service as result of recent admission to [facility] due to left MCA syndrome successfully treated with iv alteplase.  As per the available electronic medical records " 58 y/o male who was sitting watching TV and at 2300 he all of a sudden could not remember how to use the remote and was dysarthric and aphasic. His wife brought him to the ED, Upon evaluation patient is still aphasic, dysarthria and facial droop. He is anxious because he can not speak right and say what he wants to say. Jay slight left hand weakness. CT scan head with no acute changes. Discussed with Dr Ricks and OK to give IV TPA.   Denies any visual disturbance, sensory deficit, headache  Risk factors CAD, smoker   Hospital Course (synopsis of major diagnoses, care, treatment, and services provided during the course of the hospital stay): 58 y/o male with aphasia, dysarthria and facial droop. Received IV TPA. NO IR as L M3 occlusion and calcified ICA. CTA with left MCA M3 occlusion and significant stenosis of the right vertebral artery involving the proximal V4 segment.  MRI brain: scattered punctate acute infarcts within the left MCA territory involving the left insula, frontal, and anterior parietal lobes.  TTE normal EF, no wall motion abnormality, normal LA.Patient with some bradycardia on day of dc- EKG reviewed - sinus luis a, no signs of heart block.  Cholesterol 230, trig 338, HDL 36,    Beta blocker discontinued due to admitted cocaine use. Patient to follow in priority clinic. Stroke symptoms resolved .    Mr Jo comes in today stating that his language is back to baseline, has full motor capacity of his right arm, and is 100% functional at this " moment.  He complains of mild frontal HA that he thinks could be related to stress at work, but otherwise offers no other neurological complains and reports no recent events to suggest a recurrent cerebrovascular insult.  On aspirin, atorvastatin 40 mg, lisinopril, and chantix.   Patient said that he did not visit a physician in a couple of years and stopped taking all his medications after cardiac surgery.    Past Medical History:   Diagnosis Date    Coronary artery disease      Social History     Social History    Marital status: Single     Spouse name: N/A    Number of children: N/A    Years of education: N/A     Occupational History    Not on file.     Social History Main Topics    Smoking status: Current Every Day Smoker     Packs/day: 1.50     Years: 20.00     Types: Cigarettes    Smokeless tobacco: Never Used    Alcohol use Yes      Comment: beer daily    Drug use: No    Sexual activity: Not on file     Other Topics Concern    Not on file     Social History Narrative    No narrative on file     Past Surgical History:   Procedure Laterality Date    BYPASS GRAFT      triple bypass 2009    CARDIAC SURGERY      triple bypass x 2    ROTATOR CUFF REPAIR Right      Family History   Problem Relation Age of Onset    No Known Problems Mother      DM II    No Known Problems Father      Heart Disease, MI    No Known Problems Brother      Heart Disease, DM II           Review of Systems  General ROS: negative for chills, fever or weight loss  Psychological ROS: negative for hallucination, depression or suicidal ideation  Ophthalmic ROS: negative for blurry vision, photophobia or eye pain  ENT ROS: negative for epistaxis, sore throat or rhinorrhea  Respiratory ROS: no cough, shortness of breath, or wheezing  Cardiovascular ROS: no chest pain or dyspnea on exertion  Gastrointestinal ROS: no abdominal pain, change in bowel habits, or black/ bloody stools  Genito-Urinary ROS: no dysuria, trouble voiding, or  hematuria  Musculoskeletal ROS: negative for gait disturbance or muscular weakness  Neurological ROS: no syncope or seizures; no ataxia  Dermatological ROS: negative for pruritis, rash and jaundice      Physical Exam:  /72   Pulse 74   Ht 6' (1.829 m)   Wt 75.2 kg (165 lb 12.6 oz)   BMI 22.48 kg/m²   General appearance: alert, cooperative, no distress  Constitutional:Oriented to person, place, and time.appears well-developed and well-nourished.   HEENT: Normocephalic, atraumatic, neck symmetrical, no nasal discharge   Eyes: conjunctivae/corneas clear, PERRL, EOM's intact  Lungs: clear to auscultation bilaterally, no dullness to percussion bilaterally  Heart: regular rate and rhythm without rub; no displacement of the PMI   Abdomen: soft, non-tender; bowel sounds normoactive; no organomegaly  Extremities: extremities symmetric; no clubbing, cyanosis, or edema  Integument: Skin color, texture, turgor normal; no rashes; hair distrubution normal  Neurologic:   Mental status: alert and awake, oriented x 4, comprehension, naming, and repetition intact. No right to left confusion. Performs serial 7's without difficulty .No dysarthria.  CN 2-12: pupils 4 mm bilaterally, reactive to light. Fundi without papilledema. Visual fields full to confrontation. EOM full without nystagmus. Face sensation normal in all distributions. Face symmetric. Hearing grossly intact. Palate elevates well. Tongue midline without atrophy or fasciculations.  Motor: 5/5 all over  Sensory: intact in all modalities.  DTR's: 2+ all over.  Plantars: no tested.  Coordination: finger to nose and heel-knee-shin intact.  Gait: no ataxia or bradykinesia  Psychiatric: no pressured speech; normal affect; no evidence of impaired cognition     LABS:    Complete Blood Count  Lab Results   Component Value Date    RBC 4.65 10/05/2017    HGB 14.8 10/05/2017    HCT 42.8 10/05/2017    MCV 92 10/05/2017    MCH 31.8 (H) 10/05/2017    MCHC 34.6 10/05/2017    RDW  12.6 10/05/2017     10/05/2017    MPV 10.1 10/05/2017    GRAN 5.8 10/05/2017    GRAN 62.7 10/05/2017    LYMPH 2.6 10/05/2017    LYMPH 28.3 10/05/2017    MONO 0.7 10/05/2017    MONO 7.2 10/05/2017    EOS 0.1 10/05/2017    BASO 0.03 10/05/2017    EOSINOPHIL 1.4 10/05/2017    BASOPHIL 0.3 10/05/2017    DIFFMETHOD Automated 10/05/2017       Comprehensive Metabolic Panel  Lab Results   Component Value Date     10/05/2017    BUN 13 10/05/2017    CREATININE 0.7 10/05/2017     10/05/2017    K 4.2 10/05/2017     10/05/2017    PROT 7.4 10/05/2017    ALBUMIN 3.7 10/05/2017    BILITOT 0.5 10/05/2017    AST 22 10/05/2017    ALKPHOS 81 10/05/2017    CO2 22 (L) 10/05/2017    ALT 24 10/05/2017    ANIONGAP 11 10/05/2017    EGFRNONAA >60 10/05/2017    ESTGFRAFRICA >60 10/05/2017       TSH  Lab Results   Component Value Date    TSH 2.389 09/25/2017         Assessment and plan:  56 y/o with HLD, smoker, cocaine use, s/p recent small acute left MCA territory infarcts successfully treated with iv alteplase. Current NIHSS 0, modified [place] scale 0.  Stroke mechanism most likely due to focal atherosclerotic occlusion M3 segment left MCA.   On chantix, aspirin, and atorvastatin.  Has significant intracranial atherosclerotic disease (CTA with left MCA M3 occlusion and significant stenosis of the right vertebral artery involving the proximal V4 segment) and also CAD s/p CABG, thus will add plavix to aspirin.  Continue chantix for smoking cessation.  Recheck lipid profile in 2 months, target LDL<70 thus will need increasing atorvastatin to 80 mg daily.  Patient said that he did not visit a physician in a couple of years and stopped taking all his medications after cardiac surgery, hence I spent extra time in an effort to convince him regarding the importance of being adherent to treatment and keeping his medical appointments.            ICD-10-CM ICD-9-CM    1. Cerebrovascular accident (CVA) due to embolism of left  middle cerebral artery I63.412 434.11 clopidogrel (PLAVIX) 75 mg tablet     The encounter diagnosis was Cerebrovascular accident (CVA) due to embolism of left middle cerebral artery.      No orders of the defined types were placed in this encounter.          Beau Mcmillan MD

## 2017-11-03 ENCOUNTER — CLINICAL SUPPORT (OUTPATIENT)
Dept: CARDIOLOGY | Facility: CLINIC | Age: 57
End: 2017-11-03
Payer: COMMERCIAL

## 2017-11-03 ENCOUNTER — INITIAL CONSULT (OUTPATIENT)
Dept: CARDIOLOGY | Facility: CLINIC | Age: 57
End: 2017-11-03
Payer: COMMERCIAL

## 2017-11-03 ENCOUNTER — LAB VISIT (OUTPATIENT)
Dept: LAB | Facility: HOSPITAL | Age: 57
End: 2017-11-03
Attending: INTERNAL MEDICINE
Payer: COMMERCIAL

## 2017-11-03 ENCOUNTER — OFFICE VISIT (OUTPATIENT)
Dept: CARDIOLOGY | Facility: CLINIC | Age: 57
End: 2017-11-03
Payer: COMMERCIAL

## 2017-11-03 ENCOUNTER — DOCUMENTATION ONLY (OUTPATIENT)
Dept: CARDIOLOGY | Facility: CLINIC | Age: 57
End: 2017-11-03

## 2017-11-03 VITALS
HEART RATE: 64 BPM | HEIGHT: 72 IN | BODY MASS INDEX: 22.75 KG/M2 | WEIGHT: 168 LBS | DIASTOLIC BLOOD PRESSURE: 70 MMHG | SYSTOLIC BLOOD PRESSURE: 100 MMHG

## 2017-11-03 VITALS
HEIGHT: 72 IN | SYSTOLIC BLOOD PRESSURE: 134 MMHG | BODY MASS INDEX: 22.72 KG/M2 | WEIGHT: 167.75 LBS | DIASTOLIC BLOOD PRESSURE: 66 MMHG | OXYGEN SATURATION: 98 % | HEART RATE: 68 BPM

## 2017-11-03 DIAGNOSIS — I10 ESSENTIAL HYPERTENSION: ICD-10-CM

## 2017-11-03 DIAGNOSIS — E78.2 COMBINED HYPERLIPIDEMIA: ICD-10-CM

## 2017-11-03 DIAGNOSIS — I63.312 CEREBROVASCULAR ACCIDENT (CVA) DUE TO THROMBOSIS OF LEFT MIDDLE CEREBRAL ARTERY: ICD-10-CM

## 2017-11-03 DIAGNOSIS — Z95.1 HX OF CABG: ICD-10-CM

## 2017-11-03 DIAGNOSIS — I73.9 PAD (PERIPHERAL ARTERY DISEASE): ICD-10-CM

## 2017-11-03 DIAGNOSIS — I63.412 CEREBROVASCULAR ACCIDENT (CVA) DUE TO EMBOLISM OF LEFT MIDDLE CEREBRAL ARTERY: ICD-10-CM

## 2017-11-03 DIAGNOSIS — I25.10 CORONARY ARTERY DISEASE INVOLVING NATIVE CORONARY ARTERY OF NATIVE HEART WITHOUT ANGINA PECTORIS: ICD-10-CM

## 2017-11-03 DIAGNOSIS — I25.10 CORONARY ARTERY DISEASE INVOLVING NATIVE CORONARY ARTERY OF NATIVE HEART WITHOUT ANGINA PECTORIS: Primary | ICD-10-CM

## 2017-11-03 DIAGNOSIS — I63.9 CEREBROVASCULAR ACCIDENT (CVA), UNSPECIFIED MECHANISM: ICD-10-CM

## 2017-11-03 DIAGNOSIS — I63.312 CEREBRAL INFARCTION DUE TO THROMBOSIS OF LEFT MIDDLE CEREBRAL ARTERY: ICD-10-CM

## 2017-11-03 DIAGNOSIS — I65.22 STENOSIS OF LEFT CAROTID ARTERY: Primary | ICD-10-CM

## 2017-11-03 PROBLEM — R47.1 DYSARTHRIA: Status: RESOLVED | Noted: 2017-09-25 | Resolved: 2017-11-03

## 2017-11-03 PROBLEM — R47.01 APHASIA: Status: RESOLVED | Noted: 2017-09-25 | Resolved: 2017-11-03

## 2017-11-03 LAB
ALBUMIN SERPL BCP-MCNC: 3.5 G/DL
ALP SERPL-CCNC: 91 U/L
ALT SERPL W/O P-5'-P-CCNC: 28 U/L
ANION GAP SERPL CALC-SCNC: 8 MMOL/L
AST SERPL-CCNC: 24 U/L
BILIRUB SERPL-MCNC: 0.6 MG/DL
BUN SERPL-MCNC: 14 MG/DL
CALCIUM SERPL-MCNC: 9.2 MG/DL
CHLORIDE SERPL-SCNC: 106 MMOL/L
CHOLEST SERPL-MCNC: 197 MG/DL
CHOLEST/HDLC SERPL: 5.3 {RATIO}
CO2 SERPL-SCNC: 26 MMOL/L
CREAT SERPL-MCNC: 0.8 MG/DL
EST. GFR  (AFRICAN AMERICAN): >60 ML/MIN/1.73 M^2
EST. GFR  (NON AFRICAN AMERICAN): >60 ML/MIN/1.73 M^2
GLUCOSE SERPL-MCNC: 111 MG/DL
HDLC SERPL-MCNC: 37 MG/DL
HDLC SERPL: 18.8 %
INTERNAL CAROTID STENOSIS: ABNORMAL
LDLC SERPL CALC-MCNC: 103 MG/DL
NONHDLC SERPL-MCNC: 160 MG/DL
POTASSIUM SERPL-SCNC: 4.7 MMOL/L
PROT SERPL-MCNC: 7.2 G/DL
SODIUM SERPL-SCNC: 140 MMOL/L
TRIGL SERPL-MCNC: 285 MG/DL

## 2017-11-03 PROCEDURE — 93880 EXTRACRANIAL BILAT STUDY: CPT | Mod: S$GLB,,, | Performed by: INTERNAL MEDICINE

## 2017-11-03 PROCEDURE — 80053 COMPREHEN METABOLIC PANEL: CPT

## 2017-11-03 PROCEDURE — 99999 PR PBB SHADOW E&M-EST. PATIENT-LVL III: CPT | Mod: PBBFAC,,, | Performed by: INTERNAL MEDICINE

## 2017-11-03 PROCEDURE — 99999 PR PBB SHADOW E&M-EST. PATIENT-LVL V: CPT | Mod: PBBFAC,,, | Performed by: INTERNAL MEDICINE

## 2017-11-03 PROCEDURE — 80061 LIPID PANEL: CPT

## 2017-11-03 PROCEDURE — 36415 COLL VENOUS BLD VENIPUNCTURE: CPT

## 2017-11-03 PROCEDURE — 99214 OFFICE O/P EST MOD 30 MIN: CPT | Mod: S$GLB,,, | Performed by: INTERNAL MEDICINE

## 2017-11-03 RX ORDER — LISINOPRIL 5 MG/1
5 TABLET ORAL DAILY
Qty: 90 TABLET | Refills: 3 | Status: SHIPPED | OUTPATIENT
Start: 2017-11-03 | End: 2018-11-29

## 2017-11-03 RX ORDER — TADALAFIL 20 MG/1
20 TABLET ORAL DAILY
Qty: 10 TABLET | Refills: 6 | Status: SHIPPED | OUTPATIENT
Start: 2017-11-03 | End: 2018-11-29

## 2017-11-03 RX ORDER — ATORVASTATIN CALCIUM 40 MG/1
40 TABLET, FILM COATED ORAL NIGHTLY
Qty: 90 TABLET | Refills: 3 | Status: SHIPPED | OUTPATIENT
Start: 2017-11-03 | End: 2017-11-03 | Stop reason: SDUPTHER

## 2017-11-03 RX ORDER — DIPHENHYDRAMINE HCL 25 MG
50 CAPSULE ORAL ONCE
Status: CANCELLED | OUTPATIENT
Start: 2017-11-03 | End: 2017-11-03

## 2017-11-03 RX ORDER — SODIUM CHLORIDE 9 MG/ML
3 INJECTION, SOLUTION INTRAVENOUS CONTINUOUS
Status: CANCELLED | OUTPATIENT
Start: 2017-11-03 | End: 2017-11-03

## 2017-11-03 RX ORDER — CLOPIDOGREL BISULFATE 75 MG/1
75 TABLET ORAL DAILY
Qty: 90 TABLET | Refills: 3 | Status: SHIPPED | OUTPATIENT
Start: 2017-11-03 | End: 2017-12-12 | Stop reason: SDUPTHER

## 2017-11-03 RX ORDER — ATORVASTATIN CALCIUM 80 MG/1
80 TABLET, FILM COATED ORAL NIGHTLY
Qty: 90 TABLET | Refills: 3 | Status: SHIPPED | OUTPATIENT
Start: 2017-11-03 | End: 2018-11-29 | Stop reason: SDUPTHER

## 2017-11-03 NOTE — LETTER
November 3, 2017      Jaelyn Mai, FN  [address] [facility] [address]           [facility] - Cardiology  [address]  [place] [address]  Phone: [phone]          Patient: Mina Jo   MR Number: [id number]   YOB: 1960   Date of Visit: 11/3/2017       Dear Jaelyn Mai:    Thank you for referring Mina Jo to me for evaluation. Attached you will find relevant portions of my assessment and plan of care.    If you have questions, please do not hesitate to call me. I look forward to following Mina Jo along with you.    Sincerely,    Pradip Zimmerman MD    Enclosure  CC:  No Recipients    If you would like to receive this communication electronically, please contact [email or web address] or [phone] to request more information on [employer] access.    For providers and/or their staff who would like to refer a patient to [facility], please contact us through our one-stop-shop provider referral line, [facility] , at [phone].    If you feel you have received this communication in error or would no longer like to receive these types of communications, please e-mail [email or web address]

## 2017-11-03 NOTE — LETTER
November 4, 2017      Pradip Zimmerman MD  [address]  [place] [address]           [facility]-Interventional Card  [address]  [facility] [address]  Phone: [phone]          Patient: Mina Jo   MR Number: [id number]   YOB: 1960   Date of Visit: 11/3/2017       Dear Dr. Pradip Zimmerman:    Thank you for referring Mina Jo to me for evaluation. Attached you will find relevant portions of my assessment and plan of care.    If you have questions, please do not hesitate to call me. I look forward to following Mina Jo along with you.    Sincerely,    Mike Benoit MD    Enclosure  CC:  No Recipients    If you would like to receive this communication electronically, please contact [email or web address] or [phone] to request more information on [employer] Link access.    For providers and/or their staff who would like to refer a patient to [facility], please contact us through our one-stop-shop provider referral line, [facility], at [phone].    If you feel you have received this communication in error or would no longer like to receive these types of communications, please e-mail [email or web address]

## 2017-11-03 NOTE — PROGRESS NOTES
OUTPATIENT CATHETERIZATION INSTRUCTIONS    You have been scheduled for a procedure in the catheterization lab on Friday, November 10,  2017.     Please report to the Cardiology Waiting Area on the Third floor of the hospital and check in at 11 AM.   You will then be taken to the SSCU (Short Stay Cardiac Unit) and prepared for your procedure. Please be aware that this is not the time of your procedure but the time you are to arrive. The procedures are scheduled on an hourly basis; however, emergency cases take precedence over all other cases.       You may not have anything to eat or drink after midnight the night before your test. You may take your regular morning medications with water. If there are any medications that you should not take you will be instructed to hold them that morning. If you are diabetic and on Metformin (Glucophage) do not take it the day before, the day of, and for 2 days after your procedure.      The procedure will take 1-2 hours to perform. After the procedure, you will return to SSCU on the third floor of the hospital. You will need to lie still (or keep your arm still) for the next 4 to 6 hours to minimize bleeding from the puncture site. Your family may remain in the room with you during this time.       You may be able to be discharged home that same afternoon if there is someone to drive you home and there were no complications. If you have one of the balloon, stent, or device procedures you may spend the night in the hospital. Your doctor will determine, based on your progress, the date and time of your discharge. The results of your procedure will be discussed with you before you are discharged. Any further testing or procedures will be scheduled for you either before you leave or you will be called with these appointments.       If you should have any questions, concerns, or need to change the date of your procedure, please call [facility] @ [phone]    Special  Instructions:    None        THE ABOVE INSTRUCTIONS WERE GIVEN TO THE PATIENT VERBALLY AND THEY VERBALIZED UNDERSTANDING.  THEY DO NOT REQUIRE ANY SPECIAL NEEDS AND DO NOT HAVE ANY LEARNING BARRIERS.          Directions for Reporting to Cardiology Waiting Area in the Hospital  If you park in the Parking Garage:  Take elevators to the1st floor of the parking garage.  Continue past the gift shop, coffee shop, and piano.  Take a right and go to the gold elevators. (Elevator B)  Take the elevator to the 3rd floor.  Follow the arrow on the sign on the wall that says Cath Lab Registration/EP Lab Registration.  Follow the long hallway all the way around until you come to a big open area.  This is the registration area.  Check in at Reception Desk.    OR    If family is dropping you off:  Have them drop you off at the front of the Hospital under the green overhang.  Enter through the doors and take a right.  Take the E elevators to the 3rd floor Cardiology Waiting Area.  Check in at the Reception Desk in the waiting room.

## 2017-11-03 NOTE — PROGRESS NOTES
Subjective:   Patient ID:  Mina Jo is a 57 y.o. male who presents for evaluation of Carotid Artery Disease  Referring: Dr. Zimmerman    HPI: 56 yo M with PMH of left MCA CVA (9/24/17), CAD s/p 3V CABG in [facility] (03/2009; LIMA to LAD, SVG to OM1, SVG to RCA - all patent 9/2011), PAD s/p PTAS of R EIA, [facility] (2009), HTN, HLD, active tobacco use (2 PPD x 20 years, now down to 4 cigs/day and on chantix) who presents to interventional clinic after being referred by Dr. Zimmerman for symptomatic carotid artery disease.    On 9/24/17 he developed sudden onset aphasia, facial droop. He lost some motor function of right hand. Could not light cigarette, dropped keys. He came to the hospital and was diagnosed with a left MCA CVA 2/2 thrombosis and was given tPA. His FNDs resolved. His CTA was also concerning for moderate B/L atherosclerosis in carotid bifurcations as well as a hemodynamically significant left ICA stenosis. The patient reports he had stopped taking all his medications and not seen a physician in the last 7 years. He continued to smoke, have 1 beer daily and use cocaine once. He was advised to quit smoking cigarettes and cocaine. He advised on importance of medication compliance. He confirms that he is now compliant. Otherwise feels well and denies any complaints at this time. He went back to work 2 days after discharge. He saw Dr. Zimmerman today in clinic who ordered a carotid U/S given concerning findings on CTA. His carotid ultrasound revealed a 80-99% stenosis in the left proximal ICA (velocities: , ). Denies any prior hx of CEA or CVA prior to 9/24/17.     Past Medical History:   Diagnosis Date    Coronary artery disease        Past Surgical History:   Procedure Laterality Date    BYPASS GRAFT      triple bypass 2009    CARDIAC SURGERY      triple bypass x 2    ROTATOR CUFF REPAIR Right        Social History     Social History    Marital status: Single     Spouse name: N/A     Number of children: N/A    Years of education: N/A     Social History Main Topics    Smoking status: Current Every Day Smoker     Packs/day: 1.50     Years: 20.00     Types: Cigarettes    Smokeless tobacco: Never Used    Alcohol use Yes      Comment: beer daily    Drug use: No    Sexual activity: Not Asked     Other Topics Concern    None     Social History Narrative    None       Family History   Problem Relation Age of Onset    No Known Problems Mother      DM II    No Known Problems Father      Heart Disease, MI    No Known Problems Brother      Heart Disease, DM II       Patient's Medications   New Prescriptions    No medications on file   Previous Medications    ASPIRIN 325 MG TABLET    Take 1 tablet (325 mg total) by mouth once daily.    ATORVASTATIN (LIPITOR) 40 MG TABLET    Take 1 tablet (40 mg total) by mouth every evening.    CLOPIDOGREL (PLAVIX) 75 MG TABLET    Take 1 tablet (75 mg total) by mouth once daily.    LISINOPRIL (PRINIVIL,ZESTRIL) 5 MG TABLET    Take 1 tablet (5 mg total) by mouth once daily.    SERTRALINE (ZOLOFT) 25 MG TABLET    Take 1 tablet (25 mg total) by mouth every evening.    TADALAFIL (CIALIS) 20 MG TAB    Take 1 tablet (20 mg total) by mouth once daily.    VARENICLINE (CHANTIX BLAS) 0.5 MG (11)- 1 MG (42) TABLET    Take by mouth once daily. Take one 0.5mg tab by mouth once daily X3 days,then increase to one 0.5mg tab twice daily X4 days,then increase to one 1mg tab twice daily   Modified Medications    No medications on file   Discontinued Medications    No medications on file       ROS  Constitution: Negative for fever, chills, weight loss or gain.   HENT: Negative for sore throat, rhinorrhea, or headache.  Eyes: Negative for blurred or double vision.   Cardiovascular: See above  Pulmonary: Negative for SOB   Gastrointestinal: Negative for abdominal pain, nausea, vomiting, or diarrhea.   : Negative for dysuria.   Neurological: Negative for focal weakness or sensory  changes.    /66 (BP Location: Right arm, Patient Position: Sitting, BP Method: Large (Automatic))   Pulse 68   Ht 6' (1.829 m)   Wt 76.1 kg (167 lb 12.3 oz)   SpO2 98%   BMI 22.75 kg/m²     Objective:   Physical Exam  Constitutional: NAD, conversant  HEENT: Sclera anicteric, PERRLA, EOMI  Neck: No JVD, no carotid bruits  CV: RRR, no murmur, normal S1/S2, No Pericardial rub  Pulm: CTAB with no wheezes, rales, or rhonchi  GI: Abdomen soft, NTND, +BS  Extremities: No LE edema, warm and well perfused, No cyanosis, No clubbing  Skin: No ecchymosis, erythema, or ulcers  Psych: AOx3, appropriate affect  Neuro: CNII-XII intact, no focal deficits   LEFT RIGHT   RADIAL 2+ 2+   BRACHIAL 2+ 2+   FEMORAL 2+ 2+   DP 2+ 2+   TP 2+ 2+   Miguel A's Test Normal Normal       Lab Results   Component Value Date     11/03/2017    K 4.7 11/03/2017     11/03/2017    CO2 26 11/03/2017    BUN 14 11/03/2017    CREATININE 0.8 11/03/2017     (H) 11/03/2017    HGBA1C 5.2 09/25/2017    MG 2.1 09/26/2017    AST 24 11/03/2017    ALT 28 11/03/2017    ALBUMIN 3.5 11/03/2017    PROT 7.2 11/03/2017    BILITOT 0.6 11/03/2017    WBC 9.22 10/05/2017    HGB 14.8 10/05/2017    HCT 42.8 10/05/2017    HCT 44 09/24/2017    MCV 92 10/05/2017     10/05/2017    INR 0.9 09/25/2017    TSH 2.389 09/25/2017    CHOL 197 11/03/2017    HDL 37 (L) 11/03/2017    LDLCALC 103.0 11/03/2017    TRIG 285 (H) 11/03/2017    BNP 22 06/01/2017       Assessment:     1. Cerebrovascular accident (CVA) due to thrombosis of left middle cerebral artery    2. Essential hypertension    3. Coronary artery disease involving native coronary artery of native heart without angina pectoris    4. Hx of CABG    5. Combined hyperlipidemia    6. PAD (peripheral artery disease)        Plan:     Mina RICHARDSON was seen today for carotid artery disease.    Diagnoses and all orders for this visit:    Cerebrovascular accident (CVA) due to thrombosis of left middle cerebral  artery  - s/p tPA - resolution of FNDs  - c/w ASA and statin  - CD consistent with severe Left ICA stenosis  - informed consent obtained for carotid angiogram +/- stent depending on anatomy  - smoking cessation and recreational drug abstinence advised    - The risks, benefits & alternatives of the procedure were explained to the patient.    - The risks of carotid angiography include but are not limited to:  Bleeding, infection, heart rhythm abnormalities, allergic reactions, kidney injury, stroke and death.    - Should stenting be indicated, the patient has agreed to dual anti-platelet therapy for 1-consecutive year with a drug-eluting stent and a minimum of 1-month with the use of a bare metal stent.    - The risks of moderate sedation include hypotension, respiratory depression, arrhythmias, bronchospasm, & death.    - Informed consent was obtained & the patient is agreeable to proceed with the procedure.  - This patient was discussed with the attending interventional cardiologist who agrees with the above assessment & plan.      Essential hypertension  - c/w lisinopril    Coronary artery disease involving native coronary artery of native heart without angina pectoris  - stable disease  - Exercise stress echo 6/27 - no ischemia  - c/w ASA, lipitor, plavix    Hx of CABG  - patent grafts 9/2011    Combined hyperlipidemia  - c/w lipitor    PAD (peripheral artery disease)  - denies claudication, food wounds, numbness, weakness  - c/w ASA and statin    Thank you for allowing me to participate in this patient's care. Please do not hesitate to contact me with any questions or concerns.    Todd Peña MD  Cardiology Fellow  Pager: [phone]  11/3/2017 10:44 AM    I have personally seen, taken the history and examined the patient.  I agree with the housestaff whose note reflects my findings and plan as above.      His carotid ultrasound today demonstrates a severe L ICA stenosis. This is a symptomatic lesion likely  provoking his stroke in September. He would benefit from revascularization. I would recommend angiography to confirm this stenosis and to determine that there is no intracranial stenosis which was suggested by the CTA as well. I discussed the relative merits of CEA and carotid stenting. He is at usual risk for CEA. Current trial comparing CEA and ZEN demonstrate equivalent efficacy in preventing stroke. Similar event rates of death, CVA and MI. I discussed enrolling him in [facility] 2 as a registry patient. He is not eligible to be enrolled because he is symptomatic.

## 2017-11-03 NOTE — LETTER
November 4, 2017      Beau Mcmillan MD  [address]  [facility] [address]           [facility]-Interventional Card  [address]  [facility] [address]  Phone: [phone]          Patient: Mina Jo   MR Number: [id number]   YOB: 1960   Date of Visit: 11/3/2017       Dear Dr. Pradip Zimmerman:    Thank you for referring Mina Jo to me for evaluation. Attached you will find relevant portions of my assessment and plan of care.    If you have questions, please do not hesitate to call me. I look forward to following Mina Jo along with you.    Sincerely,    Mike Benoit MD    Enclosure  CC:  Pradip Zimmerman MD    If you would like to receive this communication electronically, please contact [email or web address] or [phone] to request more information on [employer] Link access.    For providers and/or their staff who would like to refer a patient to [facility], please contact us through our one-stop-shop provider referral line, [facility], at [phone].    If you feel you have received this communication in error or would no longer like to receive these types of communications, please e-mail [email or web address]

## 2017-11-03 NOTE — PROGRESS NOTES
"Subjective:   Patient ID:  Mina Jo is a 57 y.o. male who presents for follow-up of Cerebrovascular Accident      HPI: Pt is here to establish cardiology care s/p CVA.  I last saw him in 2010.  Several years ago he stopped all of his meds and hadn't seen a physician until his recent hospitalization for a CVA.    He has HTN, HPL, CAD s/p CABG, PAD and is s/p recent CVA due to thrombosis of MCA. He received tPA and has no residual deficits.  He was placed on ASA and plavix after his evaluation.  He was cocaine+ at the time of presentation.  Of note,  CTA head neck-->"Moderate bilateral atherosclerosis at the carotid bifurcations. Diffusely small caliber left cervical ICA concerning for hemodynamically significant stenosis, though technically with less than 50% narrowing per NASCET criteria.  Correlation with carotid Doppler recommended."  Dopplers not obtained yet.  From a cardiac perspective he is asymptomatic.  He denies any exertional chest discomfort, exertional dyspnea, palpitations, TIA's, syncope or presyncope  ECG - sinus luis a and possible LVH  ECHO - normal lvef, concentric remodeling, no WMA    Vitals:    11/03/17 0754   BP: 100/70   Pulse: 64   Weight: 76.2 kg (167 lb 15.9 oz)   Height: 6' (1.829 m)     Body mass index is 22.78 kg/m².  CrCl cannot be calculated (Patient's most recent lab result is older than the maximum 7 days allowed.).    Lab Results   Component Value Date     10/05/2017    K 4.2 10/05/2017     10/05/2017    CO2 22 (L) 10/05/2017    BUN 13 10/05/2017    CREATININE 0.7 10/05/2017     10/05/2017    HGBA1C 5.2 09/25/2017    MG 2.1 09/26/2017    AST 22 10/05/2017    ALT 24 10/05/2017    ALBUMIN 3.7 10/05/2017    PROT 7.4 10/05/2017    BILITOT 0.5 10/05/2017    WBC 9.22 10/05/2017    HGB 14.8 10/05/2017    HCT 42.8 10/05/2017    HCT 44 09/24/2017    MCV 92 10/05/2017     10/05/2017    INR 0.9 09/25/2017    TSH 2.389 09/25/2017    CHOL 230 (H) 09/25/2017    " HDL 36 (L) 09/25/2017    LDLCALC 126.4 09/25/2017    TRIG 338 (H) 09/25/2017       Current Outpatient Prescriptions   Medication Sig    aspirin 325 MG tablet Take 1 tablet (325 mg total) by mouth once daily.    atorvastatin (LIPITOR) 40 MG tablet Take 1 tablet (40 mg total) by mouth every evening.    clopidogrel (PLAVIX) 75 mg tablet Take 1 tablet (75 mg total) by mouth once daily.    sertraline (ZOLOFT) 25 MG tablet Take 1 tablet (25 mg total) by mouth every evening.    varenicline (CHANTIX BLAS) 0.5 mg (11)- 1 mg (42) tablet Take by mouth once daily. Take one 0.5mg tab by mouth once daily X3 days,then increase to one 0.5mg tab twice daily X4 days,then increase to one 1mg tab twice daily    lisinopril (PRINIVIL,ZESTRIL) 5 MG tablet Take 1 tablet (5 mg total) by mouth once daily.     No current facility-administered medications for this visit.        Review of Systems   Constitution: Negative for decreased appetite, weakness, malaise/fatigue, weight gain and weight loss.   Eyes: Negative for visual disturbance.   Cardiovascular: Negative for chest pain, claudication, dyspnea on exertion, irregular heartbeat, orthopnea, palpitations, paroxysmal nocturnal dyspnea and syncope.   Respiratory: Negative for cough, shortness of breath and snoring.    Skin: Negative for rash.   Musculoskeletal: Negative for arthritis, muscle cramps, muscle weakness and myalgias.   Gastrointestinal: Negative for abdominal pain, anorexia, change in bowel habit and nausea.   Genitourinary: Negative for dysuria and frequency.   Neurological: Negative for excessive daytime sleepiness, dizziness, headaches, loss of balance and numbness.   Psychiatric/Behavioral: Negative for depression.       Objective:   Physical Exam   Constitutional: He is oriented to person, place, and time. He appears well-developed and well-nourished.   HENT:   Head: Normocephalic and atraumatic.   Eyes: Pupils are equal, round, and reactive to light.   Neck: Normal  "range of motion. Neck supple.   Cardiovascular: Normal rate, regular rhythm, normal heart sounds, intact distal pulses and normal pulses.  Exam reveals no gallop.    No murmur heard.  Pulmonary/Chest: Effort normal and breath sounds normal.   Abdominal: Soft. Bowel sounds are normal. There is no hepatosplenomegaly. There is no tenderness.   Musculoskeletal: Normal range of motion.   Neurological: He is alert and oriented to person, place, and time.   Skin: Skin is warm and dry.   Psychiatric: He has a normal mood and affect. His speech is normal and behavior is normal. Judgment and thought content normal.       Assessment:     1. Coronary artery disease involving native coronary artery of native heart without angina pectoris    2. Cerebrovascular accident (CVA), unspecified mechanism    3. Essential hypertension    4. Hx of CABG    5. Combined hyperlipidemia    6. Cerebral infarction due to thrombosis of left middle cerebral artery        Plan:   Will obtain cmp and lipids now that he is on statin.  No further cardiac testing necessary at this time.    Recent CVA  With CTA -->"Moderate bilateral atherosclerosis at the carotid bifurcations. Diffusely small caliber left cervical ICA concerning for hemodynamically significant stenosis, though technically with less than 50% narrowing per NASCET criteria.  Correlation with carotid Doppler recommended."-->Obtain carotid dopplers    Recommended stop using cocaine.    Carotids with L 80-99%--->refer to Dr. Benoit  Increase Liipitor to 80 mg daily. Lipids/CMP in 6 wks      "

## 2017-11-06 ENCOUNTER — NURSE TRIAGE (OUTPATIENT)
Dept: ADMINISTRATIVE | Facility: CLINIC | Age: 57
End: 2017-11-06

## 2017-11-06 ENCOUNTER — HOSPITAL ENCOUNTER (EMERGENCY)
Facility: HOSPITAL | Age: 57
Discharge: HOME | End: 2017-11-06
Attending: EMERGENCY MEDICINE
Payer: COMMERCIAL

## 2017-11-06 VITALS
TEMPERATURE: 99 F | HEART RATE: 61 BPM | SYSTOLIC BLOOD PRESSURE: 150 MMHG | HEIGHT: 72 IN | BODY MASS INDEX: 22.62 KG/M2 | RESPIRATION RATE: 10 BRPM | DIASTOLIC BLOOD PRESSURE: 72 MMHG | WEIGHT: 167 LBS | OXYGEN SATURATION: 96 %

## 2017-11-06 DIAGNOSIS — R07.9 CHEST PAIN: ICD-10-CM

## 2017-11-06 DIAGNOSIS — R51.9 HEADACHE: Primary | ICD-10-CM

## 2017-11-06 LAB
ALBUMIN SERPL BCP-MCNC: 3.4 G/DL
ALP SERPL-CCNC: 91 U/L
ALT SERPL W/O P-5'-P-CCNC: 34 U/L
ANION GAP SERPL CALC-SCNC: 8 MMOL/L
APTT BLDCRRT: 23.6 SEC
AST SERPL-CCNC: 28 U/L
BASOPHILS # BLD AUTO: 0.03 K/UL
BASOPHILS NFR BLD: 0.4 %
BILIRUB SERPL-MCNC: 0.4 MG/DL
BILIRUB UR QL STRIP: NEGATIVE
BUN SERPL-MCNC: 14 MG/DL
CALCIUM SERPL-MCNC: 8.9 MG/DL
CHLORIDE SERPL-SCNC: 104 MMOL/L
CLARITY UR REFRACT.AUTO: CLEAR
CO2 SERPL-SCNC: 23 MMOL/L
COLOR UR AUTO: YELLOW
CREAT SERPL-MCNC: 0.8 MG/DL
DIFFERENTIAL METHOD: ABNORMAL
EOSINOPHIL # BLD AUTO: 0.1 K/UL
EOSINOPHIL NFR BLD: 1.2 %
ERYTHROCYTE [DISTWIDTH] IN BLOOD BY AUTOMATED COUNT: 12.2 %
EST. GFR  (AFRICAN AMERICAN): >60 ML/MIN/1.73 M^2
EST. GFR  (NON AFRICAN AMERICAN): >60 ML/MIN/1.73 M^2
GLUCOSE SERPL-MCNC: 121 MG/DL
GLUCOSE UR QL STRIP: NEGATIVE
HCT VFR BLD AUTO: 39.2 %
HGB BLD-MCNC: 13.6 G/DL
HGB UR QL STRIP: NEGATIVE
IMM GRANULOCYTES # BLD AUTO: 0.02 K/UL
IMM GRANULOCYTES NFR BLD AUTO: 0.2 %
INR PPP: 0.9
KETONES UR QL STRIP: NEGATIVE
LEUKOCYTE ESTERASE UR QL STRIP: NEGATIVE
LYMPHOCYTES # BLD AUTO: 2.1 K/UL
LYMPHOCYTES NFR BLD: 25.5 %
MCH RBC QN AUTO: 31.4 PG
MCHC RBC AUTO-ENTMCNC: 34.7 G/DL
MCV RBC AUTO: 91 FL
MONOCYTES # BLD AUTO: 0.7 K/UL
MONOCYTES NFR BLD: 8.2 %
NEUTROPHILS # BLD AUTO: 5.4 K/UL
NEUTROPHILS NFR BLD: 64.5 %
NITRITE UR QL STRIP: NEGATIVE
NRBC BLD-RTO: 0 /100 WBC
PH UR STRIP: 6 [PH] (ref 5–8)
PLATELET # BLD AUTO: 203 K/UL
PMV BLD AUTO: 10.3 FL
POTASSIUM SERPL-SCNC: 3.8 MMOL/L
PROT SERPL-MCNC: 7.2 G/DL
PROT UR QL STRIP: NEGATIVE
PROTHROMBIN TIME: 9.9 SEC
RBC # BLD AUTO: 4.33 M/UL
SODIUM SERPL-SCNC: 135 MMOL/L
SP GR UR STRIP: 1.01 (ref 1–1.03)
TROPONIN I SERPL DL<=0.01 NG/ML-MCNC: <0.006 NG/ML
URN SPEC COLLECT METH UR: NORMAL
UROBILINOGEN UR STRIP-ACNC: NEGATIVE EU/DL
WBC # BLD AUTO: 8.39 K/UL

## 2017-11-06 PROCEDURE — 25000003 PHARM REV CODE 250: Performed by: EMERGENCY MEDICINE

## 2017-11-06 PROCEDURE — 93010 ELECTROCARDIOGRAM REPORT: CPT | Mod: ,,, | Performed by: INTERNAL MEDICINE

## 2017-11-06 PROCEDURE — 81003 URINALYSIS AUTO W/O SCOPE: CPT

## 2017-11-06 PROCEDURE — 96361 HYDRATE IV INFUSION ADD-ON: CPT

## 2017-11-06 PROCEDURE — 96374 THER/PROPH/DIAG INJ IV PUSH: CPT

## 2017-11-06 PROCEDURE — 85730 THROMBOPLASTIN TIME PARTIAL: CPT

## 2017-11-06 PROCEDURE — 93005 ELECTROCARDIOGRAM TRACING: CPT

## 2017-11-06 PROCEDURE — 80053 COMPREHEN METABOLIC PANEL: CPT

## 2017-11-06 PROCEDURE — 63600175 PHARM REV CODE 636 W HCPCS: Performed by: EMERGENCY MEDICINE

## 2017-11-06 PROCEDURE — 87040 BLOOD CULTURE FOR BACTERIA: CPT | Mod: 59

## 2017-11-06 PROCEDURE — 99284 EMERGENCY DEPT VISIT MOD MDM: CPT | Mod: 25

## 2017-11-06 PROCEDURE — 85610 PROTHROMBIN TIME: CPT

## 2017-11-06 PROCEDURE — 99285 EMERGENCY DEPT VISIT HI MDM: CPT | Mod: ,,, | Performed by: EMERGENCY MEDICINE

## 2017-11-06 PROCEDURE — 84484 ASSAY OF TROPONIN QUANT: CPT

## 2017-11-06 PROCEDURE — 85025 COMPLETE CBC W/AUTO DIFF WBC: CPT

## 2017-11-06 RX ORDER — METOCLOPRAMIDE HYDROCHLORIDE 5 MG/ML
10 INJECTION INTRAMUSCULAR; INTRAVENOUS
Status: COMPLETED | OUTPATIENT
Start: 2017-11-06 | End: 2017-11-06

## 2017-11-06 RX ORDER — ACETAMINOPHEN 500 MG
1000 TABLET ORAL
Status: COMPLETED | OUTPATIENT
Start: 2017-11-06 | End: 2017-11-06

## 2017-11-06 RX ADMIN — SODIUM CHLORIDE 1000 ML: 0.9 INJECTION, SOLUTION INTRAVENOUS at 08:11

## 2017-11-06 RX ADMIN — METOCLOPRAMIDE 10 MG: 5 INJECTION, SOLUTION INTRAMUSCULAR; INTRAVENOUS at 08:11

## 2017-11-06 RX ADMIN — ACETAMINOPHEN 1000 MG: 500 TABLET ORAL at 08:11

## 2017-11-06 NOTE — ED PROVIDER NOTES
Encounter Date: 11/6/2017    SCRIBE #1 NOTE: I, Coby Kelly, am scribing for, and in the presence of,  Dr. Ackerman. I have scribed the entire note.       History     Chief Complaint   Patient presents with    Chest Pain     hx cabg, i have a blockage in my neck, headaches     Time patient was seen by the provider: 8:37 AM      The patient is a 57 y.o. male with hx of: CAD and CVA  that presents to the ED with a complaint of headaches for the past two days. Pt states he was having mild headaches for the past two weeks that have resolved, but that this headache gradually onset and has been constant since. Pain is described as pressure in the front area of the head and temples and kept him up at night. Pt denies any hx of headaches otherwise. He also endorses neck soreness that onset yesterday and has since resolved, subj fever, and palpitations. He denies any rhinorrhea, sore throat, recent illness, or photophobia.         The history is provided by the patient and medical records.     Review of patient's allergies indicates:  No Known Allergies  Past Medical History:   Diagnosis Date    Coronary artery disease      Past Surgical History:   Procedure Laterality Date    BYPASS GRAFT      triple bypass 2009    CARDIAC SURGERY      triple bypass x 2    ROTATOR CUFF REPAIR Right      Family History   Problem Relation Age of Onset    No Known Problems Mother      DM II    No Known Problems Father      Heart Disease, MI    No Known Problems Brother      Heart Disease, DM II     Social History   Substance Use Topics    Smoking status: Current Every Day Smoker     Packs/day: 1.50     Years: 20.00     Types: Cigarettes    Smokeless tobacco: Never Used    Alcohol use Yes      Comment: beer daily     Review of Systems   Constitutional: Positive for fever.   HENT: Negative for rhinorrhea and sore throat.    Eyes: Negative for photophobia and visual disturbance.   Respiratory: Negative for shortness of breath.     Cardiovascular: Positive for palpitations. Negative for chest pain.   Gastrointestinal: Negative for nausea.   Genitourinary: Negative for dysuria.   Musculoskeletal: Positive for neck pain (soreness). Negative for back pain.   Skin: Negative for rash.   Neurological: Positive for headaches. Negative for weakness.       Physical Exam     Initial Vitals [11/06/17 0730]   BP Pulse Resp Temp SpO2   126/62 90 18 99.1 °F (37.3 °C) 97 %      MAP       83.33         Physical Exam    Nursing note and vitals reviewed.  Constitutional: He appears well-developed and well-nourished. He is not diaphoretic. No distress.   Comfortable, well appearing, in NAD   HENT:   Head: Normocephalic and atraumatic.   Mouth/Throat: Oropharynx is clear and moist.   Moist mucous membranes   Eyes: Pupils are equal, round, and reactive to light.   No photophobia   Neck: Neck supple.   No nuchal rigidity   Cardiovascular: Normal rate and regular rhythm.   No murmur heard.  Pulmonary/Chest: Breath sounds normal. No respiratory distress. He has no wheezes. He has no rhonchi. He has no rales.   Abdominal: Soft. He exhibits no distension. There is no tenderness.   Musculoskeletal: Normal range of motion. He exhibits no edema.   Neurological: He is alert.   No focal weakness, answers questions appropriately, no confusion.    Skin: Skin is warm and dry. No rash noted.   Psychiatric: He has a normal mood and affect.         ED Course   Procedures  Labs Reviewed   CBC W/ AUTO DIFFERENTIAL - Abnormal; Notable for the following:        Result Value    RBC 4.33 (*)     Hemoglobin 13.6 (*)     Hematocrit 39.2 (*)     MCH 31.4 (*)     All other components within normal limits   COMPREHENSIVE METABOLIC PANEL - Abnormal; Notable for the following:     Sodium 135 (*)     Glucose 121 (*)     Albumin 3.4 (*)     All other components within normal limits   CULTURE, BLOOD   CULTURE, BLOOD   APTT   PROTIME-INR   URINALYSIS, REFLEX TO URINE CULTURE    Narrative:      Preferred Collection Type->Urine, Clean Catch   TROPONIN I     EKG Readings: (Independently Interpreted)   NSR, no acute ST abnormalities          Medical Decision Making:   History:   Old Medical Records: I decided to obtain old medical records.  Old Records Summarized: records from clinic visits and records from previous admission(s).       <> Summary of Records: Pt with a hx of left MCA CVA 9/24/17, CAD s/p CABG, recently evaluated by interventional cardiology on 11/03/17, had a carotid US that showed almost complete occlusion of his left internal carotid artery. Plan was made to do carotid endarterectomy/stent on 11/10/17.   Initial Assessment:   56 yo m, complex recent PMH as above, here with HA x 2-3 days, gradual onset, bifrontal, mild neck soreness yesterday but none today, ? subj fever but none today or measured.  No n/v/d.  No URI sx.  No new neurologic sx.  On exam, pt very well-appearing, afebrile rectally, no photophobia or neck pain/nuchal rigidity.  Neuro exam normal  Differential Diagnosis:   HA, considered meningitis but no fever/leukocytosis or any findings on neuro exam to suggest bacterial meningitis  Also considered ICH, pt on plavix  Sx do not seem suggestive of stroke as no new focal neurologic sx  Independently Interpreted Test(s):   I have ordered and independently interpreted EKG Reading(s) - see prior notes  Clinical Tests:   Lab Tests: Ordered and Reviewed  Radiological Study: Ordered and Reviewed  Medical Tests: Ordered and Reviewed  ED Management:  -labs  -tylenol/reglan  -CT head  -dispo uncertain            Scribe Attestation:   Scribe #1: I performed the above scribed service and the documentation accurately describes the services I performed. I attest to the accuracy of the note.            ED Course      10:02 AM  Labs unremarkable  CT head negative    11:03 AM  Pt reports feeling much better, ED work-up negative  Had brief episode CP last night but none since, EKG unchanged and  troponin negative  Was worried that HA was a sign of stroke and is very anxious about internal carotid stenosis - though he has no new sx suggestive of stroke  Discussed possibility of viral meningitis though think this is very unlikely and pt on plavix, would be very high risk procedure  Think pt safe for d/c home, already has close outpatient     Clinical Impression:   The primary encounter diagnosis was Headache. A diagnosis of Chest pain was also pertinent to this visit.    Disposition:   Disposition: Discharged  Condition: Stable     I, Dr. Nica Ackerman, personally performed the services described in this documentation. All medical record entries made by the scribe were at my direction and in my presence.  I have reviewed the chart and agree that the record reflects my personal performance and is accurate and complete. Nica Ackerman MD.  1:50 AM 11/10/2017                       Nica Ackerman MD  11/10/17 0150

## 2017-11-06 NOTE — ED TRIAGE NOTES
Pt c/o chest pain that began early this am while watching TV. Pt reports pressure like pain, non radiating.  Pain lasted approx couple minutes and felt like his heart was beating fast.  Pt also c/o headache intermittently all weekend.

## 2017-11-06 NOTE — ED NOTES
Appearance:  Pt awake, alert & oriented to person, place & time.  Pt in no acute distress at present time.  Skin:  Skin warm, dry & intact.  Mucous membranes moist.  Skin turgor normal.  Respiratory:  Respirations even, non-labored.    Neurologic:  Pt moving all extremities without difficulty.  Sensation intact.     Peripheral Vascular:  All peripheral pulses present.  Abdomen:  Abdomen soft, non-tender to palpation.

## 2017-11-06 NOTE — TELEPHONE ENCOUNTER
Pt complaining of a headache that he has had all weekend. Rating pain 8/10. Pain was so bad last night that he couldn't sleep. Advised to go to ER     Reason for Disposition   Severe headache, states 'worst headache' of life    Protocols used: ST HEADACHE-A-OH

## 2017-11-06 NOTE — ED NOTES
Pt resting on stretcher, respirations even and unlabored. Stretcher locked and in lowest position, side rails x 2, call bell within reach. Pt denies CP and SOB at this time. Pt informed he is up for discharge, will return with paperwork.

## 2017-11-07 ENCOUNTER — HOSPITAL ENCOUNTER (EMERGENCY)
Facility: HOSPITAL | Age: 57
Discharge: HOME | End: 2017-11-07
Attending: EMERGENCY MEDICINE
Payer: COMMERCIAL

## 2017-11-07 VITALS
RESPIRATION RATE: 20 BRPM | BODY MASS INDEX: 22.62 KG/M2 | DIASTOLIC BLOOD PRESSURE: 84 MMHG | HEART RATE: 82 BPM | HEIGHT: 72 IN | OXYGEN SATURATION: 95 % | SYSTOLIC BLOOD PRESSURE: 161 MMHG | TEMPERATURE: 99 F | WEIGHT: 167 LBS

## 2017-11-07 DIAGNOSIS — Z00.00 NORMAL PHYSICAL EXAM: ICD-10-CM

## 2017-11-07 DIAGNOSIS — R89.9 ABNORMAL LABORATORY TEST RESULT: Primary | ICD-10-CM

## 2017-11-07 PROCEDURE — 99282 EMERGENCY DEPT VISIT SF MDM: CPT

## 2017-11-08 NOTE — ED PROVIDER NOTES
"Encounter Date: 11/7/2017    SCRIBE #1 NOTE: I, Mike Yajaira, am scribing for, and in the presence of,  Luis Armando White MD . I have scribed the entire note.       History     Chief Complaint   Patient presents with    Abnormal Lab     Reports having been instructed by his doctor to come in to the ER for an "infection in my blood".      Time patient was seen by the provider: 8:30 PM      The patient is a 57 y.o. male with hx of: CAD  that presents to the ED with a complaint of abnormal lab result/. The patient states that he is feeling well at home today but was called by nursing staff and told to come to the ED immediately since a blood culture was positive. The patinet states that he denies fevers, N/V, headache, abdominal pain, chills, diaphoresis or any other complaints.           Review of patient's allergies indicates:  No Known Allergies  Past Medical History:   Diagnosis Date    Coronary artery disease      Past Surgical History:   Procedure Laterality Date    BYPASS GRAFT      triple bypass 2009    CARDIAC SURGERY      triple bypass x 2    ROTATOR CUFF REPAIR Right      Family History   Problem Relation Age of Onset    No Known Problems Mother      DM II    No Known Problems Father      Heart Disease, MI    No Known Problems Brother      Heart Disease, DM II     Social History   Substance Use Topics    Smoking status: Current Every Day Smoker     Packs/day: 1.50     Years: 20.00     Types: Cigarettes    Smokeless tobacco: Never Used    Alcohol use Yes      Comment: beer daily     Review of Systems   Constitutional: Negative for diaphoresis and fever.   HENT: Negative for sore throat.    Respiratory: Negative for shortness of breath.    Cardiovascular: Negative for chest pain.   Gastrointestinal: Negative for abdominal pain, nausea and vomiting.   Genitourinary: Negative for dysuria.   Musculoskeletal: Negative for back pain.   Skin: Negative for rash.   Neurological: Negative for weakness. "   Hematological: Does not bruise/bleed easily.       Physical Exam     Initial Vitals [11/07/17 1922]   BP Pulse Resp Temp SpO2   (!) 161/84 82 20 98.6 °F (37 °C) 95 %      MAP       109.67         Physical Exam    Nursing note and vitals reviewed.  Constitutional: He appears well-developed and well-nourished.   HENT:   Head: Normocephalic and atraumatic.   Eyes: Conjunctivae are normal.   Neck: Neck supple.   Cardiovascular: Normal rate, regular rhythm, normal heart sounds and intact distal pulses. Exam reveals no gallop and no friction rub.    No murmur heard.  Pulmonary/Chest: Breath sounds normal. He has no wheezes. He has no rhonchi. He has no rales.   Abdominal: Soft. He exhibits no distension. There is no tenderness.   Musculoskeletal: Normal range of motion.   Neurological: He is alert and oriented to person, place, and time.   Skin: No rash noted. No erythema.   Psychiatric: He has a normal mood and affect.         ED Course   Procedures  Labs Reviewed - No data to display          Medical Decision Making:   ED Management:  57-year-old male who was called back into the ED after was discovered that he had a positive blood culture and lab work drawn yesterday.  One bottle showed no growth and the other one showed staph in clusters.  My suspicion is this is probably a contaminant in this well appearing patient who is afebrile and has no physical complaints.  I will let him go home with instructions to return immediately if he starts to develop fever or chills, pain, nausea, vomiting, diarrhea or any other concerning symptom.  He'll follow-up with his primary physician as needed.                   ED Course      Clinical Impression:     1. Abnormal laboratory test result    2. Normal physical exam          Disposition:   Disposition: Discharged  Condition: Stable     I, Dr. Luis Armando White, personally performed the services described in this documentation. All medical record entries made by the scribtravis were at my  direction and in my presence. I have reviewed the chart and agree that the record reflects my personal performance and is accurate and complete. Luis Armando Parish MD.  12:32 AM 11/08/2017                   Luis Armando Parish MD  11/08/17 0034

## 2017-11-08 NOTE — ED NOTES
Pt to ER stating that he was seen [facility] yesterday for headaches and was called today to return to ER for positive blood culture obtained yesterday. Blood culture shows gram + cocci.  Pt states that he has a headache 3/10 with relief from advil.

## 2017-11-09 LAB
BACTERIA BLD CULT: NORMAL

## 2017-11-10 ENCOUNTER — HOSPITAL ENCOUNTER (INPATIENT)
Facility: HOSPITAL | Age: 57
LOS: 1 days | Discharge: HOME | DRG: 068 | End: 2017-11-11
Attending: INTERNAL MEDICINE | Admitting: INTERNAL MEDICINE
Payer: COMMERCIAL

## 2017-11-10 DIAGNOSIS — I25.10 ATHEROSCLEROTIC HEART DISEASE OF NATIVE CORONARY ARTERY WITHOUT ANGINA PECTORIS: ICD-10-CM

## 2017-11-10 DIAGNOSIS — I63.312 CEREBROVASCULAR ACCIDENT (CVA) DUE TO THROMBOSIS OF LEFT MIDDLE CEREBRAL ARTERY: ICD-10-CM

## 2017-11-10 DIAGNOSIS — I65.22 STENOSIS OF LEFT CAROTID ARTERY: ICD-10-CM

## 2017-11-10 LAB
ABO + RH BLD: NORMAL
ANION GAP SERPL CALC-SCNC: 9 MMOL/L
BASOPHILS # BLD AUTO: 0.02 K/UL
BASOPHILS NFR BLD: 0.3 %
BLD GP AB SCN CELLS X3 SERPL QL: NORMAL
BUN SERPL-MCNC: 14 MG/DL
CALCIUM SERPL-MCNC: 9.1 MG/DL
CHLORIDE SERPL-SCNC: 106 MMOL/L
CO2 SERPL-SCNC: 24 MMOL/L
CREAT SERPL-MCNC: 0.7 MG/DL
DIFFERENTIAL METHOD: ABNORMAL
EOSINOPHIL # BLD AUTO: 0.1 K/UL
EOSINOPHIL NFR BLD: 1.9 %
ERYTHROCYTE [DISTWIDTH] IN BLOOD BY AUTOMATED COUNT: 12.5 %
EST. GFR  (AFRICAN AMERICAN): >60 ML/MIN/1.73 M^2
EST. GFR  (NON AFRICAN AMERICAN): >60 ML/MIN/1.73 M^2
GLUCOSE SERPL-MCNC: 79 MG/DL
HCT VFR BLD AUTO: 39.8 %
HGB BLD-MCNC: 14 G/DL
IMM GRANULOCYTES # BLD AUTO: 0.01 K/UL
IMM GRANULOCYTES NFR BLD AUTO: 0.1 %
INR PPP: 1
LYMPHOCYTES # BLD AUTO: 2 K/UL
LYMPHOCYTES NFR BLD: 28.1 %
MCH RBC QN AUTO: 32.3 PG
MCHC RBC AUTO-ENTMCNC: 35.2 G/DL
MCV RBC AUTO: 92 FL
MONOCYTES # BLD AUTO: 0.5 K/UL
MONOCYTES NFR BLD: 6.6 %
NEUTROPHILS # BLD AUTO: 4.4 K/UL
NEUTROPHILS NFR BLD: 63 %
NRBC BLD-RTO: 0 /100 WBC
PERIPHERAL STENOSIS: ABNORMAL
PLATELET # BLD AUTO: 205 K/UL
PMV BLD AUTO: 10.2 FL
POTASSIUM SERPL-SCNC: 3.9 MMOL/L
PROTHROMBIN TIME: 10.3 SEC
RBC # BLD AUTO: 4.34 M/UL
SODIUM SERPL-SCNC: 139 MMOL/L
WBC # BLD AUTO: 6.94 K/UL

## 2017-11-10 PROCEDURE — 93005 ELECTROCARDIOGRAM TRACING: CPT

## 2017-11-10 PROCEDURE — 93010 ELECTROCARDIOGRAM REPORT: CPT | Mod: ,,, | Performed by: INTERNAL MEDICINE

## 2017-11-10 PROCEDURE — B4101ZZ FLUOROSCOPY OF ABDOMINAL AORTA USING LOW OSMOLAR CONTRAST: ICD-10-PCS | Performed by: INTERNAL MEDICINE

## 2017-11-10 PROCEDURE — B3171ZZ FLUOROSCOPY OF LEFT INTERNAL CAROTID ARTERY USING LOW OSMOLAR CONTRAST: ICD-10-PCS | Performed by: INTERNAL MEDICINE

## 2017-11-10 PROCEDURE — 85610 PROTHROMBIN TIME: CPT

## 2017-11-10 PROCEDURE — 86901 BLOOD TYPING SEROLOGIC RH(D): CPT

## 2017-11-10 PROCEDURE — 25000003 PHARM REV CODE 250: Performed by: STUDENT IN AN ORGANIZED HEALTH CARE EDUCATION/TRAINING PROGRAM

## 2017-11-10 PROCEDURE — 86900 BLOOD TYPING SEROLOGIC ABO: CPT

## 2017-11-10 PROCEDURE — 63600175 PHARM REV CODE 636 W HCPCS

## 2017-11-10 PROCEDURE — 36226 PLACE CATH VERTEBRAL ART: CPT | Mod: 50,,, | Performed by: INTERNAL MEDICINE

## 2017-11-10 PROCEDURE — 80048 BASIC METABOLIC PNL TOTAL CA: CPT

## 2017-11-10 PROCEDURE — 85025 COMPLETE CBC W/AUTO DIFF WBC: CPT

## 2017-11-10 PROCEDURE — 11000001 HC ACUTE MED/SURG PRIVATE ROOM

## 2017-11-10 PROCEDURE — 25000003 PHARM REV CODE 250

## 2017-11-10 PROCEDURE — C1894 INTRO/SHEATH, NON-LASER: HCPCS

## 2017-11-10 PROCEDURE — 36223 PLACE CATH CAROTID/INOM ART: CPT | Mod: 50,51,, | Performed by: INTERNAL MEDICINE

## 2017-11-10 RX ORDER — SODIUM CHLORIDE 9 MG/ML
3 INJECTION, SOLUTION INTRAVENOUS CONTINUOUS
Status: ACTIVE | OUTPATIENT
Start: 2017-11-10 | End: 2017-11-10

## 2017-11-10 RX ORDER — DIPHENHYDRAMINE HCL 50 MG
50 CAPSULE ORAL ONCE
Status: COMPLETED | OUTPATIENT
Start: 2017-11-10 | End: 2017-11-10

## 2017-11-10 RX ADMIN — DIPHENHYDRAMINE HYDROCHLORIDE 50 MG: 50 CAPSULE ORAL at 11:11

## 2017-11-10 RX ADMIN — SODIUM CHLORIDE 3 ML/KG/HR: 0.9 INJECTION, SOLUTION INTRAVENOUS at 11:11

## 2017-11-10 NOTE — DISCHARGE SUMMARY
Discharge Summary  Interventional Cardiology      Admit Date: 11/10/2017    Discharge Date:  11/11/2017    Attending Physician: Mike Benoit MD    Discharge Physician: Todd Peña MD    Principal Diagnoses: Carotid stenosis  Indication for Admission: Carotid angiogram    Discharged Condition: Good    Hospital Course:   Patient presented for outpatient carotid angiogram which went without complication. [facility] was notable for Carotid angiogram notable for severe left proximal ICA stenosis. The lesion was heavily calcified. After his 6 hour bedrest protocol, he was noted to have bleeding from vascular access site. 20 minutes of manual pressure by the RN successfully achieved hemostasis. Pulses intact, no evidence of hematoma or dressing saturation. Monitored overnight with no complications.    Outpatient Plan:  - Follow-up with vascular surgery, Dr. Sanjana Patel. You will be contacted for a set up appointment next week.  - There were no medication changes   - continue to take all medications as prescribed     Discharge Instructions and Care After a Cardiac Catheterization Procedure Performed via the Femoral Artery    Groin Care:   After the procedure, a small dressing is applied to the groin site. You may remove the dressing the day after the exam. Do not reapply a dressing. Keep the site clean and dry.   Avoid lotions, ointments or powders at the groin site for 1 week.    Groin Site Healing:  The healing groin site should be soft and dry. A bruise or a marble-sized lump may be present. Please contact your doctor or the Cardiac Cath Lab doctor if any of the following signs appear:   Redness around the groin.   Drainage from the groin.   A lump at the puncture site that enlarges or is larger than marble size.   Pain at the puncture site that makes walking difficult.   Numbness or tingling in the thigh or leg.   Calf tenderness or pain.   Swelling of the ankle or foot.   Increased area of bruising extending  into the thigh, over the buttock or into the groin.   Discoloration or coolness of the leg or foot.    Activities:  While the groin is healing, bleeding or swelling can occur as a result of stress or strain to the groin and abdominal muscles. Carefully follow these guidelines:   On the day of discharge, limit your activities.   Do not drive for 24 hours.   You may shower after 24 hours, but no tub baths are allowed for 1 week.   Climb stairs with a slow, steady pace until two days after the PCI.   Do not lift more than 10 lbs. for the next 3 days.   For the first 2 weeks after your PCI, avoid any physical activity that would raise yourheart rate above 110 beats per minute, such as tennis, jogging or weightlifting.    Medications:   It is absolutely essential that you continue taking aspirin and Plavix if a stent was placed   Do not stop either of these medicines without talking with your cardiologist first.   Take all other medicines as directed by your doctor. Do not take any extra aspirin or anti-inflammatory medicines such as Motrin, Aleve or others. They can increase your risk of bleeding. Many over-the-counter drugs contain aspirin. If you are unsure about what the drug contains, check with your pharmacist or doctor before taking it. Tylenol may be used for minor pain or fever. Do not exceed 4,000 mg of acetaminophen (Tylenol) in 24 hours.    Go to the nearest Emergency Room if you have:   Chest discomfort or pain   Groin site bleeding    Excessive bruising, blood in urine/stool or black tarry stools.          Diet: Cardiac diet    Activity: Ad hari    Disposition: Home or Self Care    Discharge Medications:      Medication List      CONTINUE taking these medications    aspirin 325 MG tablet  Take 1 tablet (325 mg total) by mouth once daily.     atorvastatin 80 MG tablet  Commonly known as:  LIPITOR  Take 1 tablet (80 mg total) by mouth every evening.     clopidogrel 75 mg tablet  Commonly known as:   PLAVIX  Take 1 tablet (75 mg total) by mouth once daily.     lisinopril 5 MG tablet  Commonly known as:  PRINIVIL,ZESTRIL  Take 1 tablet (5 mg total) by mouth once daily.     sertraline 25 MG tablet  Commonly known as:  ZOLOFT  Take 1 tablet (25 mg total) by mouth every evening.     tadalafil 20 MG Tab  Commonly known as:  CIALIS  Take 1 tablet (20 mg total) by mouth once daily.     varenicline 0.5 mg (11)- 1 mg (42) tablet  Commonly known as:  CHANTIX BLAS          Follow Up:  Follow-up Information     Sanjana Patel MD In 1 week.    Specialties:  Vascular Surgery, General Surgery  Why:  evaluated for CEA. S/p carotid angiogram. Severe ostial left ICA stenosis. Recent CVA  Contact information:  [address] [facility] [address]  [phone]                 Discussed with Dr. Benoit.    Signed:  Todd Peña MD  Cardiology Fellow  Pager: [phone]  11/10/2017 4:59 PM

## 2017-11-10 NOTE — BRIEF OP NOTE
Post Cath Progress Note      Subjective:   Patient has no complaints. He denies chest pain, shortness of breath, back pain, or leg pain. Denies numbness or weakness.      Objective:   Physical Exam   BP (!) 101/58 (BP Location: Left arm, Patient Position: Lying)   Pulse 70   Temp 97.6 °F (36.4 °C) (Oral)   Resp 18   Ht 6' (1.829 m)   Wt 75.8 kg (167 lb)   SpO2 (!) 94%   BMI 22.65 kg/m²    General: alert, awake and oriented, not in distress.  Neck: No JVD present.   Cardiovascular: Normal rate, regular rhythm, normal heart sounds and intact distal pulses.  Exam reveals no gallop and no friction rub. No murmur heard.  Pulmonary/Chest: Effort normal and breath sounds normal. No respiratory distress. He has no wheezes. He has no rales.   Abdominal: Soft. Bowel sounds are normal. He exhibits no distension. There is no tenderness. There is no rebound and no guarding.   Musculoskeletal: He no lower extremity edema. No Groin swelling or hematoma.  PULSES: The right radial pulse was 2+. The left radial pulse was 2+. The right femoral pulse was 2+. The left femoral pulse was 2+. The right post tibial pulse was 2+. The left post tibial pulse was 2+. The right dorsalis pedis pulse was 2+. The left dorsalis pedis pulse was 2+. No evidence of embolic phenomena or s/s of limb ischemia.       Assesment:  Mina Jo is a 57 y.o. male who presented to the short stay unit for elective carotid angiogram. The patient is s/p carotid angiogram. Angiogram notable for severe ostial left ICA stenosis which ws heavily calcified. Access obtained via Right CFA. Perclose attempted for hemostasis but failed. This was followed by manual pressure which was successful in establishing hemostasis.    Plan/Recs:  - Routine post-cath care  - IV fluids at 3 cc/kg/hr  - Continue medical management  - Aspirin 81 mg indefinitely  - Referral to vascular surgery (Dr. Sanjana Patel)  - Full cath report to follow    Todd Peña  MD.  Cardiology Fellow  Pager: [phone]  11/10/2017 - 4:53 PM

## 2017-11-10 NOTE — H&P (VIEW-ONLY)
Subjective:   Patient ID:  Mina Jo is a 57 y.o. male who presents for evaluation of Carotid Artery Disease  Referring: Dr. Zimmerman    HPI: 56 yo M with PMH of left MCA CVA (9/24/17), CAD s/p 3V CABG in [facility] (03/2009; LIMA to LAD, SVG to OM1, SVG to RCA - all patent 9/2011), PAD s/p PTAS of R EIA, MARTÍNEZ (2009), HTN, HLD, active tobacco use (2 PPD x 20 years, now down to 4 cigs/day and on chantix) who presents to interventional clinic after being referred by Dr. Zimmerman for symptomatic carotid artery disease.    On 9/24/17 he developed sudden onset aphasia, facial droop. He lost some motor function of right hand. Could not light cigarette, dropped keys. He came to the hospital and was diagnosed with a left MCA CVA 2/2 thrombosis and was given tPA. His FNDs resolved. His CTA was also concerning for moderate B/L atherosclerosis in carotid bifurcations as well as a hemodynamically significant left ICA stenosis. The patient reports he had stopped taking all his medications and not seen a physician in the last 7 years. He continued to smoke, have 1 beer daily and use cocaine once. He was advised to quit smoking cigarettes and cocaine. He advised on importance of medication compliance. He confirms that he is now compliant. Otherwise feels well and denies any complaints at this time. He went back to work 2 days after discharge. He saw Dr. Zimmerman today in clinic who ordered a carotid U/S given concerning findings on CTA. His carotid ultrasound revealed a 80-99% stenosis in the left proximal ICA (velocities: , ). Denies any prior hx of CEA or CVA prior to 9/24/17.     Past Medical History:   Diagnosis Date    Coronary artery disease        Past Surgical History:   Procedure Laterality Date    BYPASS GRAFT      triple bypass 2009    CARDIAC SURGERY      triple bypass x 2    ROTATOR CUFF REPAIR Right        Social History     Social History    Marital status: Single     Spouse name: N/A     Number of children: N/A    Years of education: N/A     Social History Main Topics    Smoking status: Current Every Day Smoker     Packs/day: 1.50     Years: 20.00     Types: Cigarettes    Smokeless tobacco: Never Used    Alcohol use Yes      Comment: beer daily    Drug use: No    Sexual activity: Not Asked     Other Topics Concern    None     Social History Narrative    None       Family History   Problem Relation Age of Onset    No Known Problems Mother      DM II    No Known Problems Father      Heart Disease, MI    No Known Problems Brother      Heart Disease, DM II       Patient's Medications   New Prescriptions    No medications on file   Previous Medications    ASPIRIN 325 MG TABLET    Take 1 tablet (325 mg total) by mouth once daily.    ATORVASTATIN (LIPITOR) 40 MG TABLET    Take 1 tablet (40 mg total) by mouth every evening.    CLOPIDOGREL (PLAVIX) 75 MG TABLET    Take 1 tablet (75 mg total) by mouth once daily.    LISINOPRIL (PRINIVIL,ZESTRIL) 5 MG TABLET    Take 1 tablet (5 mg total) by mouth once daily.    SERTRALINE (ZOLOFT) 25 MG TABLET    Take 1 tablet (25 mg total) by mouth every evening.    TADALAFIL (CIALIS) 20 MG TAB    Take 1 tablet (20 mg total) by mouth once daily.    VARENICLINE (CHANTIX BLAS) 0.5 MG (11)- 1 MG (42) TABLET    Take by mouth once daily. Take one 0.5mg tab by mouth once daily X3 days,then increase to one 0.5mg tab twice daily X4 days,then increase to one 1mg tab twice daily   Modified Medications    No medications on file   Discontinued Medications    No medications on file       ROS  Constitution: Negative for fever, chills, weight loss or gain.   HENT: Negative for sore throat, rhinorrhea, or headache.  Eyes: Negative for blurred or double vision.   Cardiovascular: See above  Pulmonary: Negative for SOB   Gastrointestinal: Negative for abdominal pain, nausea, vomiting, or diarrhea.   : Negative for dysuria.   Neurological: Negative for focal weakness or sensory  changes.    /66 (BP Location: Right arm, Patient Position: Sitting, BP Method: Large (Automatic))   Pulse 68   Ht 6' (1.829 m)   Wt 76.1 kg (167 lb 12.3 oz)   SpO2 98%   BMI 22.75 kg/m²     Objective:   Physical Exam  Constitutional: NAD, conversant  HEENT: Sclera anicteric, PERRLA, EOMI  Neck: No JVD, no carotid bruits  CV: RRR, no murmur, normal S1/S2, No Pericardial rub  Pulm: CTAB with no wheezes, rales, or rhonchi  GI: Abdomen soft, NTND, +BS  Extremities: No LE edema, warm and well perfused, No cyanosis, No clubbing  Skin: No ecchymosis, erythema, or ulcers  Psych: AOx3, appropriate affect  Neuro: CNII-XII intact, no focal deficits   LEFT RIGHT   RADIAL 2+ 2+   BRACHIAL 2+ 2+   FEMORAL 2+ 2+   DP 2+ 2+   TP 2+ 2+   Miguel A's Test Normal Normal       Lab Results   Component Value Date     11/03/2017    K 4.7 11/03/2017     11/03/2017    CO2 26 11/03/2017    BUN 14 11/03/2017    CREATININE 0.8 11/03/2017     (H) 11/03/2017    HGBA1C 5.2 09/25/2017    MG 2.1 09/26/2017    AST 24 11/03/2017    ALT 28 11/03/2017    ALBUMIN 3.5 11/03/2017    PROT 7.2 11/03/2017    BILITOT 0.6 11/03/2017    WBC 9.22 10/05/2017    HGB 14.8 10/05/2017    HCT 42.8 10/05/2017    HCT 44 09/24/2017    MCV 92 10/05/2017     10/05/2017    INR 0.9 09/25/2017    TSH 2.389 09/25/2017    CHOL 197 11/03/2017    HDL 37 (L) 11/03/2017    LDLCALC 103.0 11/03/2017    TRIG 285 (H) 11/03/2017    BNP 22 06/01/2017       Assessment:     1. Cerebrovascular accident (CVA) due to thrombosis of left middle cerebral artery    2. Essential hypertension    3. Coronary artery disease involving native coronary artery of native heart without angina pectoris    4. Hx of CABG    5. Combined hyperlipidemia    6. PAD (peripheral artery disease)        Plan:     Mina RICHARDSON was seen today for carotid artery disease.    Diagnoses and all orders for this visit:    Cerebrovascular accident (CVA) due to thrombosis of left middle cerebral  artery  - s/p tPA - resolution of FNDs  - c/w ASA and statin  - CD consistent with severe Left ICA stenosis  - informed consent obtained for carotid angiogram +/- stent depending on anatomy  - smoking cessation and recreational drug abstinence advised    - The risks, benefits & alternatives of the procedure were explained to the patient.    - The risks of carotid angiography include but are not limited to:  Bleeding, infection, heart rhythm abnormalities, allergic reactions, kidney injury, stroke and death.    - Should stenting be indicated, the patient has agreed to dual anti-platelet therapy for 1-consecutive year with a drug-eluting stent and a minimum of 1-month with the use of a bare metal stent.    - The risks of moderate sedation include hypotension, respiratory depression, arrhythmias, bronchospasm, & death.    - Informed consent was obtained & the patient is agreeable to proceed with the procedure.  - This patient was discussed with the attending interventional cardiologist who agrees with the above assessment & plan.      Essential hypertension  - c/w lisinopril    Coronary artery disease involving native coronary artery of native heart without angina pectoris  - stable disease  - Exercise stress echo 6/27 - no ischemia  - c/w ASA, lipitor, plavix    Hx of CABG  - patent grafts 9/2011    Combined hyperlipidemia  - c/w lipitor    PAD (peripheral artery disease)  - denies claudication, food wounds, numbness, weakness  - c/w ASA and statin    Thank you for allowing me to participate in this patient's care. Please do not hesitate to contact me with any questions or concerns.    Todd Peña MD  Cardiology Fellow  Pager: [phone]  11/3/2017 10:44 AM    I have personally seen, taken the history and examined the patient.  I agree with the housestaff whose note reflects my findings and plan as above.      His carotid ultrasound today demonstrates a severe L ICA stenosis. This is a symptomatic lesion likely  provoking his stroke in September. He would benefit from revascularization. I would recommend angiography to confirm this stenosis and to determine that there is no intracranial stenosis which was suggested by the CTA as well. I discussed the relative merits of CEA and carotid stenting. He is at usual risk for CEA. Current trial comparing CEA and ZEN demonstrate equivalent efficacy in preventing stroke. Similar event rates of death, CVA and MI. I discussed enrolling him in [facility] as a registry patient. He is not eligible to be enrolled because he is symptomatic.

## 2017-11-10 NOTE — INTERVAL H&P NOTE
The patient has been examined and the H&P has been reviewed:    I concur with the findings and no changes have occurred since H&P was written.    Anesthesia/Surgery risks, benefits and alternative options discussed and understood by patient/family.    Took ASA and plavix this morning  NPO since last night      Active Hospital Problems    Diagnosis  POA    Cerebrovascular accident (CVA) due to thrombosis of left middle cerebral artery [I63.312]  Yes      Resolved Hospital Problems    Diagnosis Date Resolved POA   No resolved problems to display.     Cerebrovascular accident (CVA) due to thrombosis of left middle cerebral artery  - CD consistent with severe Left ICA stenosis  - informed consent obtained for carotid angiogram +/- stent depending on anatomy  - [facility] access    Todd Peña MD  Cardiology Fellow  Pager: [phone]  11/10/2017 11:49 AM

## 2017-11-11 VITALS
RESPIRATION RATE: 18 BRPM | HEART RATE: 63 BPM | OXYGEN SATURATION: 96 % | WEIGHT: 167 LBS | BODY MASS INDEX: 22.62 KG/M2 | TEMPERATURE: 98 F | HEIGHT: 72 IN | SYSTOLIC BLOOD PRESSURE: 147 MMHG | DIASTOLIC BLOOD PRESSURE: 78 MMHG

## 2017-11-11 LAB
BACTERIA BLD CULT: NORMAL
BASOPHILS # BLD AUTO: 0.03 K/UL
BASOPHILS NFR BLD: 0.3 %
DIFFERENTIAL METHOD: ABNORMAL
EOSINOPHIL # BLD AUTO: 0.2 K/UL
EOSINOPHIL NFR BLD: 2.1 %
ERYTHROCYTE [DISTWIDTH] IN BLOOD BY AUTOMATED COUNT: 12.7 %
HCT VFR BLD AUTO: 41.5 %
HGB BLD-MCNC: 14 G/DL
IMM GRANULOCYTES # BLD AUTO: 0.03 K/UL
IMM GRANULOCYTES NFR BLD AUTO: 0.3 %
LYMPHOCYTES # BLD AUTO: 2.2 K/UL
LYMPHOCYTES NFR BLD: 21.8 %
MCH RBC QN AUTO: 31.4 PG
MCHC RBC AUTO-ENTMCNC: 33.7 G/DL
MCV RBC AUTO: 93 FL
MONOCYTES # BLD AUTO: 0.7 K/UL
MONOCYTES NFR BLD: 7.1 %
NEUTROPHILS # BLD AUTO: 6.8 K/UL
NEUTROPHILS NFR BLD: 68.4 %
NRBC BLD-RTO: 0 /100 WBC
PLATELET # BLD AUTO: 216 K/UL
PMV BLD AUTO: 10.4 FL
RBC # BLD AUTO: 4.46 M/UL
WBC # BLD AUTO: 9.92 K/UL

## 2017-11-11 PROCEDURE — 36415 COLL VENOUS BLD VENIPUNCTURE: CPT

## 2017-11-11 PROCEDURE — 85025 COMPLETE CBC W/AUTO DIFF WBC: CPT

## 2017-11-11 NOTE — PROGRESS NOTES
Patient noted to have bleeding from right groin access site after sitting on bedside then walking to bathroom. Does not appear to be a  Tract ooze as bleeding was forceful in nature. Manual pressure applied to groin site for 15 minutes. Instructed to remain on bedrest for now. Notified DR.Gupta. ferris to come assess patient.

## 2017-11-11 NOTE — PROGRESS NOTES
Called by nurse that patient had bleeding from right groin when he walked after finishing his bed rest order. Per nurse's description it did not appear to be track bleeding but blood was squirting out.She held pressure for 15-20 mins and I was called when bleeding had already been stopped.On exam patient's right groin shows no bleeding, no hematoma, No bruise or ecchymoses,no bruit, Distal pulses are intact 2+ DP and PD, /60, HR 60 Patient also examined by Dr Peña. Discussed with Dr Myers-plan for bedrest again for 6 hours and discharge in am if stable and no more bleeding episodes.

## 2017-11-11 NOTE — PLAN OF CARE
Problem: Cardiac Catheterization (Diagnostic/Interventional) (Adult)  Goal: Signs and Symptoms of Listed Potential Problems Will be Absent, Minimized or Managed (Cardiac Catheterization)  Signs and symptoms of listed potential problems will be absent, minimized or managed by discharge/transition of care (reference Cardiac Catheterization (Diagnostic/Interventional) (Adult) CPG).   Outcome: Ongoing (interventions implemented as appropriate)  Pt states adequate pain control. Call bell within reach and enc pt to call when in need. Safety and fall prec in place. Pt states no needs at current. Will monitor.

## 2017-11-11 NOTE — PLAN OF CARE
Received report from HILL Martinez. Patient s/p [facility], AAOx3. VSS, no c/o pain or discomfort at this time, resp even and unlabored. Gauze/tegaderm dressing to r groin is CDI. No active bleeding. No hematoma noted. Post procedure protocol reviewed with patient and patient's family. Understanding verbalized. Family members at bedside. Nurse call bell within reach. Will continue to monitor per post procedure protocol.

## 2017-11-14 ENCOUNTER — INITIAL CONSULT (OUTPATIENT)
Dept: VASCULAR SURGERY | Facility: CLINIC | Age: 57
End: 2017-11-14
Payer: COMMERCIAL

## 2017-11-14 VITALS
BODY MASS INDEX: 22.35 KG/M2 | TEMPERATURE: 98 F | SYSTOLIC BLOOD PRESSURE: 121 MMHG | HEART RATE: 77 BPM | DIASTOLIC BLOOD PRESSURE: 69 MMHG | WEIGHT: 165 LBS | HEIGHT: 72 IN

## 2017-11-14 DIAGNOSIS — I65.22 CAROTID ARTERY STENOSIS, SYMPTOMATIC, LEFT: ICD-10-CM

## 2017-11-14 DIAGNOSIS — Z01.818 PRE-OP EVALUATION: Primary | ICD-10-CM

## 2017-11-14 DIAGNOSIS — I63.239 CAROTID STENOSIS, SYMPTOMATIC, WITH INFARCTION: Primary | ICD-10-CM

## 2017-11-14 PROCEDURE — 99205 OFFICE O/P NEW HI 60 MIN: CPT | Mod: S$GLB,,, | Performed by: SURGERY

## 2017-11-14 PROCEDURE — 99999 PR PBB SHADOW E&M-EST. PATIENT-LVL III: CPT | Mod: PBBFAC,,, | Performed by: SURGERY

## 2017-11-14 RX ORDER — LIDOCAINE HYDROCHLORIDE 10 MG/ML
1 INJECTION, SOLUTION EPIDURAL; INFILTRATION; INTRACAUDAL; PERINEURAL ONCE
Status: CANCELLED | OUTPATIENT
Start: 2017-11-14 | End: 2017-11-14

## 2017-11-14 RX ORDER — MUPIROCIN 20 MG/G
OINTMENT TOPICAL
Status: CANCELLED | OUTPATIENT
Start: 2017-11-14

## 2017-11-14 NOTE — PROGRESS NOTES
Patient ID: Mina Jo is a 57 y.o. male.    I. HISTORY     Chief Complaint: Consult      HPI   Mina Jo is a 57 y.o. man referred from Dr JOSÉ MIGUEL Benoit with recent left MCA stroke (17), CAD s/p CABG x3 (), PAD s/p PTA R EIA / MARTÍNEZ () who was referred for symptomatic severe carotid stenosis.     His CVA in  he had sudden onset aphasia, facial droop and right arm weakness. Found to have L MCA thrombosis. Given tPa with resolution of his deficits.   He reports since released from the hospital he has had no focal neurological deficits. No blurred vision.     He is a current smoker -- 2 ppd x 20 yrs  Stress echo in  with no evidence of ischemia  On aspirin / Plavix / statin  Reports occasional cocaine    No reported symptoms of claudication per the patient  No chest pain or SOB with exertion    Review of Systems   Constitution: Negative for chills, decreased appetite and fever.   HENT: Negative.    Eyes: Positive for blurred vision.   Cardiovascular: Positive for claudication and palpitations. Negative for chest pain, dyspnea on exertion, irregular heartbeat and leg swelling.   Respiratory: Negative for cough and shortness of breath.    Endocrine: Negative for cold intolerance and heat intolerance.   Gastrointestinal: Negative for abdominal pain.   Psychiatric/Behavioral: Negative.        II. PHYSICAL EXAM   Right Arm BP - Sittin/74 (17 1058)  Left Arm BP - Sittin/69 (17 1058)  Pulse: 77  Temp: 97.8 °F (36.6 °C)  Body mass index is 22.38 kg/m².      Physical Exam   Constitutional: He is oriented to person, place, and time. He appears well-developed. No distress.   HENT:   Head: Normocephalic and atraumatic.   Eyes: Pupils are equal, round, and reactive to light. No scleral icterus.   Neck: Normal range of motion. Neck supple. No JVD present.   No carotid bruits   Cardiovascular: Normal rate and regular rhythm.  Exam reveals no gallop and no friction rub.     No murmur heard.  Pulmonary/Chest: Effort normal and breath sounds normal. No respiratory distress.   Abdominal: Soft. Bowel sounds are normal. There is no tenderness. There is no rebound.   Musculoskeletal: Normal range of motion. He exhibits no edema.   wwp     Lymphadenopathy:     He has no cervical adenopathy.   Neurological: He is alert and oriented to person, place, and time. No cranial nerve deficit. Coordination normal.   Vitals reviewed.      III. ASSESSMENT & PLAN (MEDICAL DECISION MAKING)     1. Carotid stenosis, symptomatic, with infarction    2. Carotid artery stenosis, symptomatic, left        Imaging Results:  Carotid Duplex:   R L   40-59% , EDV 53 80-99%  ,           Assessment/Diagnosis and Plan:    Mina Jo is a 57 y.o. CAD (s/p CABG), PAD (s/p PTA) and severe symptomatic left carotid stenosis (80-99%)     Plan for L CEA on 11/17/17  Continue aspirin / Plavix / statin  Smoking cessation counseling    I have explained the risks, benefits, medical treatments, and alternatives of the procedure in detail including discussion of CEA vs ZEN.    Risks include but are not limited to stroke, death, heart attack, bleeding or nerve injury leading problems with tongue, swallowing, and voice, scar or poor wound cosmesis.   The patient voiced understanding,all questions were answered and agreed to proceed as planned.    Sanjana Patel MD FACS [facility]  Vascular & Endovascular Surgery

## 2017-11-14 NOTE — LETTER
November 14, 2017      Mike Benoit MD  [address]  [place] [address]           [facility]  [address]  [place] [address]  Phone: [phone]  Fax: [phone]          Patient: Mina Jo   MR Number: [id number]   YOB: 1960   Date of Visit: 11/14/2017       Dear Dr. Mike Benoit:    Thank you for referring Mina Jo to me for evaluation. Attached you will find relevant portions of my assessment and plan of care.    If you have questions, please do not hesitate to call me. I look forward to following Mina Jo along with you.    Sincerely,    Zuhair Hernández MD    Enclosure  CC:  No Recipients    If you would like to receive this communication electronically, please contact [email or web address] or [phone] to request more information on [employer] Link access.    For providers and/or their staff who would like to refer a patient to [facility], please contact us through our one-stop-shop provider referral line, [facility], at [phone].    If you feel you have received this communication in error or would no longer like to receive these types of communications, please e-mail [email or web address]

## 2017-11-16 ENCOUNTER — TELEPHONE (OUTPATIENT)
Dept: VASCULAR SURGERY | Facility: CLINIC | Age: 57
End: 2017-11-16

## 2017-11-17 ENCOUNTER — RESEARCH ENCOUNTER (OUTPATIENT)
Dept: RESEARCH | Facility: HOSPITAL | Age: 57
End: 2017-11-17

## 2017-11-17 ENCOUNTER — ANESTHESIA (OUTPATIENT)
Dept: SURGERY | Facility: HOSPITAL | Age: 57
End: 2017-11-17
Payer: COMMERCIAL

## 2017-11-17 ENCOUNTER — ANESTHESIA EVENT (OUTPATIENT)
Dept: SURGERY | Facility: HOSPITAL | Age: 57
End: 2017-11-17
Payer: COMMERCIAL

## 2017-11-17 ENCOUNTER — HOSPITAL ENCOUNTER (OUTPATIENT)
Facility: HOSPITAL | Age: 57
LOS: 1 days | Discharge: HOME | End: 2017-11-18
Attending: SURGERY | Admitting: SURGERY
Payer: COMMERCIAL

## 2017-11-17 DIAGNOSIS — I63.239 CAROTID STENOSIS, SYMPTOMATIC, WITH INFARCTION: ICD-10-CM

## 2017-11-17 DIAGNOSIS — I65.22 CAROTID ARTERY STENOSIS, SYMPTOMATIC, LEFT: Primary | ICD-10-CM

## 2017-11-17 LAB
POC ACTIVATED CLOTTING TIME K: 131 SEC (ref 74–137)
POC ACTIVATED CLOTTING TIME K: 252 SEC (ref 74–137)
SAMPLE: ABNORMAL
SAMPLE: NORMAL

## 2017-11-17 PROCEDURE — 71000033 HC RECOVERY, INTIAL HOUR: Performed by: SURGERY

## 2017-11-17 PROCEDURE — 36000706: Performed by: SURGERY

## 2017-11-17 PROCEDURE — 94761 N-INVAS EAR/PLS OXIMETRY MLT: CPT

## 2017-11-17 PROCEDURE — 27201423 OPTIME MED/SURG SUP & DEVICES STERILE SUPPLY: Performed by: SURGERY

## 2017-11-17 PROCEDURE — 63600175 PHARM REV CODE 636 W HCPCS: Performed by: ANESTHESIOLOGY

## 2017-11-17 PROCEDURE — 27800903 OPTIME MED/SURG SUP & DEVICES OTHER IMPLANTS: Performed by: SURGERY

## 2017-11-17 PROCEDURE — 36000707: Performed by: SURGERY

## 2017-11-17 PROCEDURE — 25000003 PHARM REV CODE 250: Performed by: SURGERY

## 2017-11-17 PROCEDURE — C1768 GRAFT, VASCULAR: HCPCS | Performed by: SURGERY

## 2017-11-17 PROCEDURE — 37000009 HC ANESTHESIA EA ADD 15 MINS: Performed by: SURGERY

## 2017-11-17 PROCEDURE — 27201037 HC PRESSURE MONITORING SET UP

## 2017-11-17 PROCEDURE — 25000003 PHARM REV CODE 250: Performed by: ANESTHESIOLOGY

## 2017-11-17 PROCEDURE — 71000039 HC RECOVERY, EACH ADD'L HOUR: Performed by: SURGERY

## 2017-11-17 PROCEDURE — S5010 5% DEXTROSE AND 0.45% SALINE: HCPCS | Performed by: SURGERY

## 2017-11-17 PROCEDURE — D9220A PRA ANESTHESIA: Mod: ,,, | Performed by: ANESTHESIOLOGY

## 2017-11-17 PROCEDURE — 37000008 HC ANESTHESIA 1ST 15 MINUTES: Performed by: SURGERY

## 2017-11-17 PROCEDURE — 63600175 PHARM REV CODE 636 W HCPCS: Performed by: SURGERY

## 2017-11-17 PROCEDURE — 63600175 PHARM REV CODE 636 W HCPCS

## 2017-11-17 PROCEDURE — 35301 RECHANNELING OF ARTERY: CPT | Mod: LT,,, | Performed by: SURGERY

## 2017-11-17 DEVICE — GRAFT VAS GUARD 0.8X8CM BOVINE: Type: IMPLANTABLE DEVICE | Site: ARTERIAL | Status: FUNCTIONAL

## 2017-11-17 RX ORDER — SODIUM CHLORIDE 0.9 % (FLUSH) 0.9 %
3 SYRINGE (ML) INJECTION
Status: DISCONTINUED | OUTPATIENT
Start: 2017-11-17 | End: 2017-11-17

## 2017-11-17 RX ORDER — LIDOCAINE HYDROCHLORIDE 10 MG/ML
INJECTION, SOLUTION EPIDURAL; INFILTRATION; INTRACAUDAL; PERINEURAL
Status: DISCONTINUED | OUTPATIENT
Start: 2017-11-17 | End: 2017-11-17 | Stop reason: HOSPADM

## 2017-11-17 RX ORDER — PHENYLEPHRINE HYDROCHLORIDE 10 MG/ML
INJECTION INTRAVENOUS
Status: DISCONTINUED | OUTPATIENT
Start: 2017-11-17 | End: 2017-11-17

## 2017-11-17 RX ORDER — SODIUM CHLORIDE 9 MG/ML
INJECTION, SOLUTION INTRAVENOUS CONTINUOUS
Status: DISCONTINUED | OUTPATIENT
Start: 2017-11-17 | End: 2017-11-17

## 2017-11-17 RX ORDER — CEFAZOLIN SODIUM 2 G/50ML
2 SOLUTION INTRAVENOUS
Status: COMPLETED | OUTPATIENT
Start: 2017-11-17 | End: 2017-11-18

## 2017-11-17 RX ORDER — DEXTROSE MONOHYDRATE AND SODIUM CHLORIDE 5; .45 G/100ML; G/100ML
INJECTION, SOLUTION INTRAVENOUS CONTINUOUS
Status: DISCONTINUED | OUTPATIENT
Start: 2017-11-17 | End: 2017-11-18 | Stop reason: HOSPADM

## 2017-11-17 RX ORDER — PROTAMINE SULFATE 10 MG/ML
INJECTION, SOLUTION INTRAVENOUS
Status: DISCONTINUED | OUTPATIENT
Start: 2017-11-17 | End: 2017-11-17

## 2017-11-17 RX ORDER — ATORVASTATIN CALCIUM 20 MG/1
80 TABLET, FILM COATED ORAL NIGHTLY
Status: DISCONTINUED | OUTPATIENT
Start: 2017-11-17 | End: 2017-11-18 | Stop reason: HOSPADM

## 2017-11-17 RX ORDER — ONDANSETRON 2 MG/ML
INJECTION INTRAMUSCULAR; INTRAVENOUS
Status: DISCONTINUED | OUTPATIENT
Start: 2017-11-17 | End: 2017-11-17

## 2017-11-17 RX ORDER — GLYCOPYRROLATE 0.2 MG/ML
INJECTION INTRAMUSCULAR; INTRAVENOUS
Status: DISCONTINUED | OUTPATIENT
Start: 2017-11-17 | End: 2017-11-17

## 2017-11-17 RX ORDER — ROCURONIUM BROMIDE 10 MG/ML
INJECTION, SOLUTION INTRAVENOUS
Status: DISCONTINUED | OUTPATIENT
Start: 2017-11-17 | End: 2017-11-17

## 2017-11-17 RX ORDER — VARENICLINE TARTRATE 1 MG/1
1 TABLET, FILM COATED ORAL 2 TIMES DAILY
Status: DISCONTINUED | OUTPATIENT
Start: 2017-11-17 | End: 2017-11-18 | Stop reason: HOSPADM

## 2017-11-17 RX ORDER — KETAMINE HYDROCHLORIDE 100 MG/ML
INJECTION, SOLUTION INTRAMUSCULAR; INTRAVENOUS
Status: DISCONTINUED | OUTPATIENT
Start: 2017-11-17 | End: 2017-11-17

## 2017-11-17 RX ORDER — ESMOLOL HYDROCHLORIDE 10 MG/ML
INJECTION INTRAVENOUS
Status: DISCONTINUED | OUTPATIENT
Start: 2017-11-17 | End: 2017-11-17

## 2017-11-17 RX ORDER — LIDOCAINE HYDROCHLORIDE 10 MG/ML
1 INJECTION, SOLUTION EPIDURAL; INFILTRATION; INTRACAUDAL; PERINEURAL ONCE
Status: DISCONTINUED | OUTPATIENT
Start: 2017-11-17 | End: 2017-11-17 | Stop reason: HOSPADM

## 2017-11-17 RX ORDER — ASPIRIN 325 MG
325 TABLET ORAL DAILY
Status: DISCONTINUED | OUTPATIENT
Start: 2017-11-17 | End: 2017-11-18 | Stop reason: HOSPADM

## 2017-11-17 RX ORDER — LISINOPRIL 5 MG/1
5 TABLET ORAL DAILY
Status: DISCONTINUED | OUTPATIENT
Start: 2017-11-17 | End: 2017-11-18 | Stop reason: HOSPADM

## 2017-11-17 RX ORDER — AMOXICILLIN 250 MG
1 CAPSULE ORAL 2 TIMES DAILY
Status: DISCONTINUED | OUTPATIENT
Start: 2017-11-17 | End: 2017-11-18 | Stop reason: HOSPADM

## 2017-11-17 RX ORDER — FENTANYL CITRATE 50 UG/ML
INJECTION, SOLUTION INTRAMUSCULAR; INTRAVENOUS
Status: DISCONTINUED | OUTPATIENT
Start: 2017-11-17 | End: 2017-11-17

## 2017-11-17 RX ORDER — MUPIROCIN 20 MG/G
OINTMENT TOPICAL
Status: DISCONTINUED | OUTPATIENT
Start: 2017-11-17 | End: 2017-11-17 | Stop reason: HOSPADM

## 2017-11-17 RX ORDER — HYDROCODONE BITARTRATE AND ACETAMINOPHEN 5; 325 MG/1; MG/1
1 TABLET ORAL EVERY 4 HOURS PRN
Status: DISCONTINUED | OUTPATIENT
Start: 2017-11-17 | End: 2017-11-18 | Stop reason: HOSPADM

## 2017-11-17 RX ORDER — DEXAMETHASONE SODIUM PHOSPHATE 4 MG/ML
INJECTION, SOLUTION INTRA-ARTICULAR; INTRALESIONAL; INTRAMUSCULAR; INTRAVENOUS; SOFT TISSUE
Status: DISCONTINUED | OUTPATIENT
Start: 2017-11-17 | End: 2017-11-17

## 2017-11-17 RX ORDER — HEPARIN SODIUM 1000 [USP'U]/ML
INJECTION, SOLUTION INTRAVENOUS; SUBCUTANEOUS
Status: DISCONTINUED | OUTPATIENT
Start: 2017-11-17 | End: 2017-11-17 | Stop reason: HOSPADM

## 2017-11-17 RX ORDER — SERTRALINE HYDROCHLORIDE 25 MG/1
25 TABLET, FILM COATED ORAL NIGHTLY
Status: DISCONTINUED | OUTPATIENT
Start: 2017-11-17 | End: 2017-11-18 | Stop reason: HOSPADM

## 2017-11-17 RX ORDER — MIDAZOLAM HYDROCHLORIDE 1 MG/ML
INJECTION, SOLUTION INTRAMUSCULAR; INTRAVENOUS
Status: DISCONTINUED | OUTPATIENT
Start: 2017-11-17 | End: 2017-11-17

## 2017-11-17 RX ORDER — LIDOCAINE HCL/PF 100 MG/5ML
SYRINGE (ML) INTRAVENOUS
Status: DISCONTINUED | OUTPATIENT
Start: 2017-11-17 | End: 2017-11-17

## 2017-11-17 RX ORDER — MUPIROCIN 20 MG/G
1 OINTMENT TOPICAL 2 TIMES DAILY
Status: DISCONTINUED | OUTPATIENT
Start: 2017-11-17 | End: 2017-11-18 | Stop reason: HOSPADM

## 2017-11-17 RX ORDER — ONDANSETRON 2 MG/ML
4 INJECTION INTRAMUSCULAR; INTRAVENOUS EVERY 12 HOURS PRN
Status: DISCONTINUED | OUTPATIENT
Start: 2017-11-17 | End: 2017-11-18 | Stop reason: HOSPADM

## 2017-11-17 RX ORDER — CLOPIDOGREL BISULFATE 75 MG/1
75 TABLET ORAL DAILY
Status: DISCONTINUED | OUTPATIENT
Start: 2017-11-17 | End: 2017-11-18 | Stop reason: HOSPADM

## 2017-11-17 RX ORDER — ACETAMINOPHEN 10 MG/ML
INJECTION, SOLUTION INTRAVENOUS
Status: DISCONTINUED | OUTPATIENT
Start: 2017-11-17 | End: 2017-11-17

## 2017-11-17 RX ORDER — HYDROMORPHONE HYDROCHLORIDE 1 MG/ML
0.2 INJECTION, SOLUTION INTRAMUSCULAR; INTRAVENOUS; SUBCUTANEOUS EVERY 5 MIN PRN
Status: DISCONTINUED | OUTPATIENT
Start: 2017-11-17 | End: 2017-11-17 | Stop reason: HOSPADM

## 2017-11-17 RX ORDER — PROPOFOL 10 MG/ML
VIAL (ML) INTRAVENOUS
Status: DISCONTINUED | OUTPATIENT
Start: 2017-11-17 | End: 2017-11-17

## 2017-11-17 RX ADMIN — MUPIROCIN 1 G: 20 OINTMENT TOPICAL at 08:11

## 2017-11-17 RX ADMIN — HYDROCODONE BITARTRATE AND ACETAMINOPHEN 1 TABLET: 5; 325 TABLET ORAL at 04:11

## 2017-11-17 RX ADMIN — CEFAZOLIN SODIUM 2 G: 2 SOLUTION INTRAVENOUS at 04:11

## 2017-11-17 RX ADMIN — ONDANSETRON 4 MG: 2 INJECTION INTRAMUSCULAR; INTRAVENOUS at 11:11

## 2017-11-17 RX ADMIN — PHENYLEPHRINE HYDROCHLORIDE 200 MCG: 10 INJECTION INTRAVENOUS at 09:11

## 2017-11-17 RX ADMIN — KETAMINE HYDROCHLORIDE 10 MG: 100 INJECTION, SOLUTION, CONCENTRATE INTRAMUSCULAR; INTRAVENOUS at 10:11

## 2017-11-17 RX ADMIN — PHENYLEPHRINE HYDROCHLORIDE 50 MCG: 10 INJECTION INTRAVENOUS at 10:11

## 2017-11-17 RX ADMIN — GLYCOPYRROLATE 0.2 MG: 0.2 INJECTION, SOLUTION INTRAMUSCULAR; INTRAVENOUS at 09:11

## 2017-11-17 RX ADMIN — VARENICLINE TARTRATE 1 MG: 1 TABLET, FILM COATED ORAL at 08:11

## 2017-11-17 RX ADMIN — PHENYLEPHRINE HYDROCHLORIDE 0.3 MCG/KG/MIN: 10 INJECTION INTRAVENOUS at 09:11

## 2017-11-17 RX ADMIN — ASPIRIN 325 MG ORAL TABLET 325 MG: 325 PILL ORAL at 12:11

## 2017-11-17 RX ADMIN — HYDROMORPHONE HYDROCHLORIDE 0.2 MG: 1 INJECTION, SOLUTION INTRAMUSCULAR; INTRAVENOUS; SUBCUTANEOUS at 12:11

## 2017-11-17 RX ADMIN — ATORVASTATIN CALCIUM 80 MG: 20 TABLET, FILM COATED ORAL at 08:11

## 2017-11-17 RX ADMIN — ACETAMINOPHEN 1000 MG: 10 INJECTION, SOLUTION INTRAVENOUS at 10:11

## 2017-11-17 RX ADMIN — KETAMINE HYDROCHLORIDE 30 MG: 100 INJECTION, SOLUTION, CONCENTRATE INTRAMUSCULAR; INTRAVENOUS at 09:11

## 2017-11-17 RX ADMIN — LIDOCAINE HYDROCHLORIDE 100 MG: 20 INJECTION, SOLUTION INTRAVENOUS at 09:11

## 2017-11-17 RX ADMIN — MIDAZOLAM HYDROCHLORIDE 2 MG: 1 INJECTION, SOLUTION INTRAMUSCULAR; INTRAVENOUS at 09:11

## 2017-11-17 RX ADMIN — SERTRALINE HYDROCHLORIDE 25 MG: 25 TABLET ORAL at 09:11

## 2017-11-17 RX ADMIN — ESMOLOL HYDROCHLORIDE 30 MG: 10 INJECTION INTRAVENOUS at 09:11

## 2017-11-17 RX ADMIN — HYDROCODONE BITARTRATE AND ACETAMINOPHEN 1 TABLET: 5; 325 TABLET ORAL at 11:11

## 2017-11-17 RX ADMIN — FENTANYL CITRATE 50 MCG: 50 INJECTION, SOLUTION INTRAMUSCULAR; INTRAVENOUS at 09:11

## 2017-11-17 RX ADMIN — PHENYLEPHRINE HYDROCHLORIDE 100 MCG: 10 INJECTION INTRAVENOUS at 09:11

## 2017-11-17 RX ADMIN — DEXAMETHASONE SODIUM PHOSPHATE 8 MG: 4 INJECTION, SOLUTION INTRAMUSCULAR; INTRAVENOUS at 09:11

## 2017-11-17 RX ADMIN — SODIUM CHLORIDE, SODIUM GLUCONATE, SODIUM ACETATE, POTASSIUM CHLORIDE, MAGNESIUM CHLORIDE, SODIUM PHOSPHATE, DIBASIC, AND POTASSIUM PHOSPHATE: .53; .5; .37; .037; .03; .012; .00082 INJECTION, SOLUTION INTRAVENOUS at 09:11

## 2017-11-17 RX ADMIN — GLYCOPYRROLATE 0.2 MG: 0.2 INJECTION, SOLUTION INTRAMUSCULAR; INTRAVENOUS at 10:11

## 2017-11-17 RX ADMIN — STANDARDIZED SENNA CONCENTRATE AND DOCUSATE SODIUM 1 TABLET: 8.6; 5 TABLET, FILM COATED ORAL at 12:11

## 2017-11-17 RX ADMIN — PROTAMINE SULFATE 75 MG: 10 INJECTION, SOLUTION INTRAVENOUS at 10:11

## 2017-11-17 RX ADMIN — ROCURONIUM BROMIDE 60 MG: 10 INJECTION, SOLUTION INTRAVENOUS at 09:11

## 2017-11-17 RX ADMIN — ESMOLOL HYDROCHLORIDE 30 MG: 10 INJECTION INTRAVENOUS at 10:11

## 2017-11-17 RX ADMIN — PHENYLEPHRINE HYDROCHLORIDE 50 MCG: 10 INJECTION INTRAVENOUS at 11:11

## 2017-11-17 RX ADMIN — HEPARIN SODIUM 1000 UNITS: 1000 INJECTION, SOLUTION INTRAVENOUS; SUBCUTANEOUS at 09:11

## 2017-11-17 RX ADMIN — SUGAMMADEX 200 MG: 100 INJECTION, SOLUTION INTRAVENOUS at 11:11

## 2017-11-17 RX ADMIN — HYDROCODONE BITARTRATE AND ACETAMINOPHEN 1 TABLET: 5; 325 TABLET ORAL at 08:11

## 2017-11-17 RX ADMIN — SODIUM CHLORIDE: 0.9 INJECTION, SOLUTION INTRAVENOUS at 07:11

## 2017-11-17 RX ADMIN — SODIUM CHLORIDE, SODIUM GLUCONATE, SODIUM ACETATE, POTASSIUM CHLORIDE, MAGNESIUM CHLORIDE, SODIUM PHOSPHATE, DIBASIC, AND POTASSIUM PHOSPHATE: .53; .5; .37; .037; .03; .012; .00082 INJECTION, SOLUTION INTRAVENOUS at 10:11

## 2017-11-17 RX ADMIN — Medication 2 G: at 09:11

## 2017-11-17 RX ADMIN — REMIFENTANIL HYDROCHLORIDE 0.2 MCG/KG/MIN: 1 INJECTION, POWDER, LYOPHILIZED, FOR SOLUTION INTRAVENOUS at 09:11

## 2017-11-17 RX ADMIN — PROPOFOL 150 MG: 10 INJECTION, EMULSION INTRAVENOUS at 09:11

## 2017-11-17 RX ADMIN — PROPOFOL 50 MG: 10 INJECTION, EMULSION INTRAVENOUS at 09:11

## 2017-11-17 RX ADMIN — DEXTROSE AND SODIUM CHLORIDE: 5; .45 INJECTION, SOLUTION INTRAVENOUS at 12:11

## 2017-11-17 RX ADMIN — PHENYLEPHRINE HYDROCHLORIDE 200 MCG: 10 INJECTION INTRAVENOUS at 10:11

## 2017-11-17 RX ADMIN — CLOPIDOGREL 75 MG: 75 TABLET, FILM COATED ORAL at 12:11

## 2017-11-17 RX ADMIN — PHENYLEPHRINE HYDROCHLORIDE 300 MCG: 10 INJECTION INTRAVENOUS at 09:11

## 2017-11-17 RX ADMIN — FENTANYL CITRATE 150 MCG: 50 INJECTION, SOLUTION INTRAMUSCULAR; INTRAVENOUS at 09:11

## 2017-11-17 NOTE — TRANSFER OF CARE
Anesthesia Transfer of Care Note    Patient: Mina Jo    Procedure(s) Performed: Procedure(s) (LRB):  Endarterectomy-Carotid (Left)    Patient location: PACU    Anesthesia Type: general    Transport from OR: Transported from OR on room air with adequate spontaneous ventilation    Post pain: adequate analgesia    Post assessment: no apparent anesthetic complications    Post vital signs: stable    Level of consciousness: awake, alert and oriented    Nausea/Vomiting: no nausea/vomiting    Complications: none    Transfer of care protocol was followed      Last vitals:   Visit Vitals  /77   Pulse 60   Temp 36.5 °C (97.7 °F) (Oral)   Resp 16   Ht 6' (1.829 m)   Wt 75.8 kg (167 lb)   SpO2 98%   BMI 22.65 kg/m²

## 2017-11-17 NOTE — ANESTHESIA PREPROCEDURE EVALUATION
11/17/2017  Mina Jo is a 57 y.o., male.    Anesthesia Evaluation         Review of Systems  Anesthesia Hx:  No problems with previous Anesthesia   Social:  Smoker, Alcohol Use History of cocaine use   Hematology/Oncology:        Hematology Comments: On Plavix, last dose 24 hrs ago   Cardiovascular:   CAD asymptomatic CABG/stent   Denies Angina.             ECG has been reviewed. Echo 2017  CONCLUSIONS     1 - Concentric remodeling.     2 - No wall motion abnormalities.     3 - Normal left ventricular systolic function (EF 60-65%).     4 - Indeterminate LV diastolic function.     5 - Normal right ventricular systolic function .    Pulmonary:  Pulmonary Normal    Neurological:   CVA, no residual symptoms        Physical Exam  General:  Well nourished    Airway/Jaw/Neck:  Airway Findings: Mouth Opening: Small, but > 3cm Tongue: Normal  General Airway Assessment: Adult  Mallampati: III  Improves to II with phonation.  TM Distance: Normal, at least 6 cm  Jaw/Neck Findings:     Neck ROM: Normal ROM      Dental:  Dental Findings: In tact   Chest/Lungs:  Chest/Lungs Findings: Clear to auscultation, Normal Respiratory Rate     Heart/Vascular:  Heart Findings: Rate: Normal  Rhythm: Regular Rhythm  Sounds: Normal        Mental Status:  Mental Status Findings:  Cooperative, Alert and Oriented, Anxious         Anesthesia Plan  Type of Anesthesia, risks & benefits discussed:  Anesthesia Type:  general  Patient's Preference:   Intra-op Monitoring Plan: standard ASA monitors and arterial line  Intra-op Monitoring Plan Comments:   Post Op Pain Control Plan: multimodal analgesia, IV/PO Opioids PRN and per primary service following discharge from PACU  Post Op Pain Control Plan Comments:   Induction:   IV  Beta Blocker:  Patient is not currently on a Beta-Blocker (No further documentation required).       Informed  Consent: Patient understands risks and agrees with Anesthesia plan.  Questions answered. Anesthesia consent signed with patient.  ASA Score: 3     Day of Surgery Review of History & Physical:            Ready For Surgery From Anesthesia Perspective.

## 2017-11-17 NOTE — PLAN OF CARE
Problem: Patient Care Overview  Goal: Plan of Care Review  Outcome: Ongoing (interventions implemented as appropriate)  POC reviewed with pt, verbalized understanding. Pt AAOx4, VSS. IVFs infusing, pt tolerating clear liquid diet. Left neck dressing clean and intact, no drainage noted. Pt remains free of any falls/injury. Call light within reach, family at bedside. Will continue to monitor.

## 2017-11-17 NOTE — ANESTHESIA POSTPROCEDURE EVALUATION
Anesthesia Post Evaluation    Patient: Mina Jo    Procedure(s) Performed: Procedure(s) (LRB):  Endarterectomy-Carotid (Left)    Final Anesthesia Type: general  Patient location during evaluation: PACU  Patient participation: Yes- Able to Participate  Level of consciousness: awake and alert  Post-procedure vital signs: reviewed and stable  Pain management: adequate  Airway patency: patent  PONV status at discharge: No PONV  Anesthetic complications: no      Cardiovascular status: hemodynamically stable and blood pressure returned to baseline  Respiratory status: unassisted, spontaneous ventilation and face mask  Hydration status: euvolemic  Follow-up not needed.        Visit Vitals  /77   Pulse 60   Temp 36.5 °C (97.7 °F) (Oral)   Resp 16   Ht 6' (1.829 m)   Wt 75.8 kg (167 lb)   SpO2 98%   BMI 22.65 kg/m²       Pain/Nyla Score: Pain Assessment Performed: Yes (11/17/2017  8:13 AM)  Presence of Pain: denies (11/17/2017  8:13 AM)

## 2017-11-17 NOTE — BRIEF OP NOTE
[facility]  Brief Operative Note    SUMMARY     Surgery Date: 11/17/2017     Surgeon(s) and Role:     * Sanjana Patel MD - Primary    Assisting Surgeon: Raulito Martinez MD    Pre-op Diagnosis:  Left Carotid stenosis, symptomatic, with infarction [I63.239]     Left Hemispheric stroke    Post-op Diagnosis:  Same    Procedure(s) (LRB):  Endarterectomy-Carotid (Left)    Anesthesia: General    Description of Procedure: left CEA for symptomatic high grade stenosis    Description of the findings of the procedure: friable plaque, Bovine patch    Estimated Blood Loss: 25mL         Specimens:   Specimen (12h ago through future)    Plaque for Biomarker study

## 2017-11-17 NOTE — OP NOTE
11/17/2017      Pre-operative Diagnosis:    Left Carotid stenosis, symptomatic, with infarction [I63.239]  Left Hemispheric stroke    Post-operative Diagnosis:   same.    Procedures:  Endarterectomy-Carotid (Left)    Surgeon: Sanjana Patel MD    Assistants: Raulito Martinez MD    Anesthesia: General    Findings/Key elements:    left CEA for symptomatic high grade stenosis    EBL:  25mL           Complications:  None; patient tolerated the procedure well.           Disposition: PACU.           Condition: stable    Procedure Details:  The patient was seen in the Holding Room. The risks, benefits, complications, treatment options, and expected outcomes were discussed with the patient. The patient concurred with the proposed plan, giving informed consent.  The site of surgery properly noted/marked.   The patient was taken to the operating room and underwent general anesthesia which they tolerated well. left neck was prepped and draped in the usual sterile fashion. A Time Out was held and the above information confirmed. A longitudinal incision was made along the anterior border of the sternocleidomastoid muscle and the dissection carried with electrocautery through the platysma reflecting the sternomastoid posteriorly. The jugular vein was identified and reflected posteriorly by ligating the facial vein identifying the vagus nerve which was carefully avoided and visualized during and at the completion of the procedure and noted to be intact. Sharp dissection was performed down to the level of the common carotid artery  Umbilical tape was placed around the common carotid artery and dissection continued cephalad. The hypoglossal nerve was identified and care was taken not to injure it. The ICA was encircled with the umbilical tape and the external controlled with Silastic vessel loops. The patient was systemically heparinized and ACT utilized to confirm adequate anticoagulation.The internal, external and common carotid  arteries were clamped in that order. A longitudinal arteriotomy was begun on the common carotid artery and then extended through the bulb and into the internal carotid past the area of disease. Focal but near occlusive unstable and friable plaque was noted at the level of bifurcation extending into ICA.  A 10-Portuguese straight shunt was used, placed first in the internal and then the common and good backbleeding was noted.  A suitable endarterectomy plane was established and brought around circumferentially on the common and then sharply divided.  It was then taken up to the external carotid artery where there was little opportunity to do an eversion endarterectomy. The dissection and the endarterectomy then continued up the ICA, where this feathered fairly nicely. Two tacking stitch was required on the 5 and 7 o'clock aspect.  Significant time was then taken in copiously irrigating the endarterectomized surface and all loose debris was meticulously removed under loupe magnification. The endarterectomy was then closed using a bovine patch sewn in a running fashion with a 6-0 Prolene suture. Prior to completion of this, the shunt was removed and there was appropriate back flushing and forward flushing performed. Flow was then restored first to the common, second to the external and finally to the internal, in that order.  There was good hemostasis with no need for further sutures.     Continuous wave Doppler interrogation of the ICA showed continuous diastolic flow.     Protamine reversal was given, and after meticulous hemostasis was achieved with Surgicel, the incision was closed with a running 3-0 Monocryl in the platysma and a running 4-0 subcuticular stitch.  Sterile dressing was applied and the patient was awakened from anesthesia and taken to Recovery Room in stable condition.    Dr. Patel was scrubbed and present for entire procedure.     Raulito Martinez MD  Vascular/Endovascular Surgery Fellow

## 2017-11-17 NOTE — NURSING TRANSFER
Nursing Transfer Note      11/17/2017     Transfer To: 609    Transfer via stretcher    Transfer with cardiac monitoring,IVF    Transported by 2 RN    Medicines sent: NONE    Chart send with patient: No    Notified: friend

## 2017-11-17 NOTE — H&P (VIEW-ONLY)
Patient ID: Mina Jo is a 57 y.o. male.    I. HISTORY     Chief Complaint: Consult      HPI   Mina Jo is a 57 y.o. man referred from Dr JOSÉ MIGUEL Benoit with recent left MCA stroke (17), CAD s/p CABG x3 (), PAD s/p PTA R EIA / MARTÍNEZ () who was referred for symptomatic severe carotid stenosis.     His CVA in  he had sudden onset aphasia, facial droop and right arm weakness. Found to have L MCA thrombosis. Given tPa with resolution of his deficits.   He reports since released from the hospital he has had no focal neurological deficits. No blurred vision.     He is a current smoker -- 2 ppd x 20 yrs  Stress echo in  with no evidence of ischemia  On aspirin / Plavix / statin  Reports occasional cocaine    No reported symptoms of claudication per the patient  No chest pain or SOB with exertion    Review of Systems   Constitution: Negative for chills, decreased appetite and fever.   HENT: Negative.    Eyes: Positive for blurred vision.   Cardiovascular: Positive for claudication and palpitations. Negative for chest pain, dyspnea on exertion, irregular heartbeat and leg swelling.   Respiratory: Negative for cough and shortness of breath.    Endocrine: Negative for cold intolerance and heat intolerance.   Gastrointestinal: Negative for abdominal pain.   Psychiatric/Behavioral: Negative.        II. PHYSICAL EXAM   Right Arm BP - Sittin/74 (17 1058)  Left Arm BP - Sittin/69 (17 1058)  Pulse: 77  Temp: 97.8 °F (36.6 °C)  Body mass index is 22.38 kg/m².      Physical Exam   Constitutional: He is oriented to person, place, and time. He appears well-developed. No distress.   HENT:   Head: Normocephalic and atraumatic.   Eyes: Pupils are equal, round, and reactive to light. No scleral icterus.   Neck: Normal range of motion. Neck supple. No JVD present.   No carotid bruits   Cardiovascular: Normal rate and regular rhythm.  Exam reveals no gallop and no friction rub.     No murmur heard.  Pulmonary/Chest: Effort normal and breath sounds normal. No respiratory distress.   Abdominal: Soft. Bowel sounds are normal. There is no tenderness. There is no rebound.   Musculoskeletal: Normal range of motion. He exhibits no edema.   wwp     Lymphadenopathy:     He has no cervical adenopathy.   Neurological: He is alert and oriented to person, place, and time. No cranial nerve deficit. Coordination normal.   Vitals reviewed.      III. ASSESSMENT & PLAN (MEDICAL DECISION MAKING)     1. Carotid stenosis, symptomatic, with infarction    2. Carotid artery stenosis, symptomatic, left        Imaging Results:  Carotid Duplex:   R L   40-59% , EDV 53 80-99%  ,           Assessment/Diagnosis and Plan:    Mina Jo is a 57 y.o. CAD (s/p CABG), PAD (s/p PTA) and severe symptomatic left carotid stenosis (80-99%)     Plan for L CEA on 11/17/17  Continue aspirin / Plavix / statin  Smoking cessation counseling    I have explained the risks, benefits, medical treatments, and alternatives of the procedure in detail including discussion of CEA vs ZEN.    Risks include but are not limited to stroke, death, heart attack, bleeding or nerve injury leading problems with tongue, swallowing, and voice, scar or poor wound cosmesis.   The patient voiced understanding,all questions were answered and agreed to proceed as planned.    Sanjana Patel MD FACS [facility]  Vascular & Endovascular Surgery

## 2017-11-17 NOTE — INTERVAL H&P NOTE
The patient has been examined and the H&P has been reviewed:    I concur with the findings and no changes have occurred since H&P was written.    Anesthesia/Surgery risks, benefits and alternative options discussed and understood by patient/family.          Active Hospital Problems    Diagnosis  POA    Carotid artery stenosis, symptomatic, left [I65.22]  Yes      Resolved Hospital Problems    Diagnosis Date Resolved POA   No resolved problems to display.

## 2017-11-18 VITALS
TEMPERATURE: 98 F | OXYGEN SATURATION: 95 % | BODY MASS INDEX: 22.62 KG/M2 | DIASTOLIC BLOOD PRESSURE: 60 MMHG | HEART RATE: 61 BPM | WEIGHT: 167 LBS | SYSTOLIC BLOOD PRESSURE: 129 MMHG | RESPIRATION RATE: 20 BRPM | HEIGHT: 72 IN

## 2017-11-18 PROCEDURE — 63600175 PHARM REV CODE 636 W HCPCS: Performed by: SURGERY

## 2017-11-18 PROCEDURE — 25000003 PHARM REV CODE 250: Performed by: SURGERY

## 2017-11-18 RX ORDER — OXYCODONE AND ACETAMINOPHEN 5; 325 MG/1; MG/1
1 TABLET ORAL EVERY 4 HOURS PRN
Qty: 45 TABLET | Refills: 0 | Status: SHIPPED | OUTPATIENT
Start: 2017-11-18 | End: 2018-11-29

## 2017-11-18 RX ADMIN — HYDROCODONE BITARTRATE AND ACETAMINOPHEN 1 TABLET: 5; 325 TABLET ORAL at 08:11

## 2017-11-18 RX ADMIN — CEFAZOLIN SODIUM 2 G: 2 SOLUTION INTRAVENOUS at 12:11

## 2017-11-18 RX ADMIN — MUPIROCIN 1 G: 20 OINTMENT TOPICAL at 09:11

## 2017-11-18 RX ADMIN — HYDROCODONE BITARTRATE AND ACETAMINOPHEN 1 TABLET: 5; 325 TABLET ORAL at 12:11

## 2017-11-18 RX ADMIN — VARENICLINE TARTRATE 1 MG: 1 TABLET, FILM COATED ORAL at 08:11

## 2017-11-18 RX ADMIN — HYDROCODONE BITARTRATE AND ACETAMINOPHEN 1 TABLET: 5; 325 TABLET ORAL at 05:11

## 2017-11-18 RX ADMIN — CLOPIDOGREL 75 MG: 75 TABLET, FILM COATED ORAL at 08:11

## 2017-11-18 RX ADMIN — ASPIRIN 325 MG ORAL TABLET 325 MG: 325 PILL ORAL at 08:11

## 2017-11-18 RX ADMIN — LISINOPRIL 5 MG: 5 TABLET ORAL at 08:11

## 2017-11-18 NOTE — NURSING
Discontinued the patient's peripheral IV and arterial line per Dr. Rajput's order, catheters intact. Read the patient his discharge and Rx instructions. The patient verbalized understanding of all instructions. Patient refused a wheelchair and will walk-out once his ride arrives.

## 2017-11-18 NOTE — PLAN OF CARE
Problem: Patient Care Overview  Goal: Individualization & Mutuality  Outcome: Ongoing (interventions implemented as appropriate)  Pt is AOx4 and was injury free during night shift.  Breathing is regular equal and unlabored bilaterally on room air.  IV infused during night shift. Dressing on left side of neck dry and intact, but there is some noticeable swelling on the left side of neck.  Pain controlled with oral pain medication. Pt does complain of pain on left side of neck and it hurts to swallow, but the pt has no problem swallowing water and medication. The pt prefers ice water to drink .

## 2017-11-19 NOTE — DISCHARGE SUMMARY
[facility]  Orthopedics  Discharge Summary      Patient Name: Mina Jo  MRN: [id number]  Admission Date: 11/17/2017  Hospital Length of Stay: 1 days  Discharge Date and Time: 11/18/2017  1:53 PM  Attending Physician: No att. providers found   Discharging Provider: Fermin Rajput MD  Primary Care Provider: Primary Doctor No    HPI: HPI   Mina Jo is a 57 y.o. man referred from Dr JOSÉ MIGUEL Benoit with recent left MCA stroke (9/24/17), CAD s/p CABG x3 ('09), PAD s/p PTA R EIA / MARTÍNEZ ('09) who was referred for symptomatic severe carotid stenosis.      His CVA in Sept '17 he had sudden onset aphasia, facial droop and right arm weakness. Found to have L MCA thrombosis. Given tPa with resolution of his deficits.   He reports since released from the hospital he has had no focal neurological deficits. No blurred vision.      He is a current smoker -- 2 ppd x 20 yrs  Stress echo in June '17 with no evidence of ischemia  On aspirin / Plavix / statin  Reports occasional cocaine    Procedure(s) (LRB):  Endarterectomy-Carotid (Left)      Hospital Course: Patient presented for above procedure.  Tolerated it well and was discharged home POD 1 after voiding, tolerating diet, ambulating, pain controlled.  Discharge instructions, follow-up appointment, and med rec are below.          Significant Diagnostic Studies: No pertinent studies.    Pending Diagnostic Studies:     None        Final Active Diagnoses:    Diagnosis Date Noted POA    PRINCIPAL PROBLEM:  Carotid artery stenosis, symptomatic, left [I65.22] 11/17/2017 Yes      Problems Resolved During this Admission:    Diagnosis Date Noted Date Resolved POA      Discharged Condition: stable    Disposition: Home or Self Care    Follow Up:  Follow-up Information     Sanjana Patel MD In 1 month.    Specialties:  Vascular Surgery, General Surgery  Contact information:  [address] [facility] [address] [phone]                 Patient  Instructions:     VAS US Doppler Carotid Bilateral   Standing Status: Future  Standing Exp. Date: 11/18/18     Diet general     Call MD for:  temperature >100.4     Call MD for:  persistent nausea and vomiting or diarrhea     Call MD for:  severe uncontrolled pain     Call MD for:  redness, tenderness, or signs of infection (pain, swelling, redness, odor or green/yellow discharge around incision site)     Call MD for:  difficulty breathing or increased cough     Call MD for:  severe persistent headache     Call MD for:  worsening rash     Call MD for:  persistent dizziness, light-headedness, or visual disturbances     Call MD for:  increased confusion or weakness       Medications:  Reconciled Home Medications:   Discharge Medication List as of 11/18/2017 12:09 PM      START taking these medications    Details   oxyCODONE-acetaminophen (PERCOCET) 5-325 mg per tablet Take 1 tablet by mouth every 4 (four) hours as needed for Pain., Starting Sat 11/18/2017, Print         CONTINUE these medications which have NOT CHANGED    Details   aspirin 325 MG tablet Take 1 tablet (325 mg total) by mouth once daily., Starting Wed 9/27/2017, Until Thu 9/27/2018, Normal      atorvastatin (LIPITOR) 80 MG tablet Take 1 tablet (80 mg total) by mouth every evening., Starting Fri 11/3/2017, Until Sat 11/3/2018, Normal      clopidogrel (PLAVIX) 75 mg tablet Take 1 tablet (75 mg total) by mouth once daily., Starting Fri 11/3/2017, Until Sat 11/3/2018, Normal      lisinopril (PRINIVIL,ZESTRIL) 5 MG tablet Take 1 tablet (5 mg total) by mouth once daily., Starting Fri 11/3/2017, Normal      sertraline (ZOLOFT) 25 MG tablet Take 1 tablet (25 mg total) by mouth every evening., Starting Thu 10/5/2017, Until Thu 11/16/2017, No Print      tadalafil (CIALIS) 20 MG Tab Take 1 tablet (20 mg total) by mouth once daily., Starting Fri 11/3/2017, Until Sat 11/3/2018, Normal      varenicline (CHANTIX BLAS) 0.5 mg (11)- 1 mg (42) tablet Take by mouth every  evening. Take one 0.5mg tab by mouth once daily X3 days,then increase to one 0.5mg tab twice daily X4 days,then increase to one 1mg tab twice daily , Historical Med             Fermin Rajput MD  Orthopedics  [facility]

## 2017-11-20 LAB
POC ACTIVATED CLOTTING TIME K: 318 SEC (ref 74–137)
SAMPLE: ABNORMAL

## 2017-11-20 RX ORDER — SERTRALINE HYDROCHLORIDE 25 MG/1
TABLET, FILM COATED ORAL
Qty: 30 TABLET | Refills: 1 | OUTPATIENT
Start: 2017-11-20

## 2017-11-21 NOTE — PROGRESS NOTES
IRB# [id number]  Comparison of Molecular Signaling Study  JOSE DANIEL Patel    Patient was consented by Pau Back on 11/17/2017 for the above study. Copy of consent scanned into EPIC and original can be found in the research office study binder.

## 2017-11-27 RX ORDER — AMLODIPINE BESYLATE 5 MG/1
TABLET ORAL
Qty: 30 TABLET | Refills: 11 | Status: SHIPPED | OUTPATIENT
Start: 2017-11-27 | End: 2018-11-29

## 2017-12-06 ENCOUNTER — TELEPHONE (OUTPATIENT)
Dept: NEUROLOGY | Facility: CLINIC | Age: 57
End: 2017-12-06

## 2017-12-06 NOTE — TELEPHONE ENCOUNTER
----- Message from Marty Ricks MD sent at 12/5/2017  8:45 AM CST -----  January 9th. Clinic please.      ----- Message -----  From: Sanjana Patel MD  Sent: 11/17/2017  11:06 AM  To: MD Blank Stewart -     [place] looking over the chart for Mr Jo. Admitted in Sept for a left hemispheric stroke and had a high grade left ICA stenosis which was under estimated by the CT report.    I did his CEA today after ICA stenosis identified by cardiology.    TS.

## 2017-12-12 DIAGNOSIS — I63.412 CEREBROVASCULAR ACCIDENT (CVA) DUE TO EMBOLISM OF LEFT MIDDLE CEREBRAL ARTERY: ICD-10-CM

## 2017-12-12 RX ORDER — CLOPIDOGREL BISULFATE 75 MG/1
75 TABLET ORAL DAILY
Qty: 90 TABLET | Refills: 3 | Status: SHIPPED | OUTPATIENT
Start: 2017-12-12 | End: 2018-11-29 | Stop reason: SDUPTHER

## 2017-12-13 ENCOUNTER — HOSPITAL ENCOUNTER (OUTPATIENT)
Dept: VASCULAR SURGERY | Facility: CLINIC | Age: 57
Discharge: HOME | End: 2017-12-13
Attending: STUDENT IN AN ORGANIZED HEALTH CARE EDUCATION/TRAINING PROGRAM
Payer: COMMERCIAL

## 2017-12-13 ENCOUNTER — OFFICE VISIT (OUTPATIENT)
Dept: VASCULAR SURGERY | Facility: CLINIC | Age: 57
End: 2017-12-13
Payer: COMMERCIAL

## 2017-12-13 VITALS
WEIGHT: 165 LBS | HEART RATE: 95 BPM | SYSTOLIC BLOOD PRESSURE: 115 MMHG | TEMPERATURE: 98 F | DIASTOLIC BLOOD PRESSURE: 70 MMHG | HEIGHT: 72 IN | BODY MASS INDEX: 22.35 KG/M2

## 2017-12-13 DIAGNOSIS — I65.23 BILATERAL CAROTID ARTERY STENOSIS: ICD-10-CM

## 2017-12-13 DIAGNOSIS — I63.312 CEREBRAL INFARCTION DUE TO THROMBOSIS OF LEFT MIDDLE CEREBRAL ARTERY: Primary | ICD-10-CM

## 2017-12-13 DIAGNOSIS — I65.22 CAROTID ARTERY STENOSIS, SYMPTOMATIC, LEFT: ICD-10-CM

## 2017-12-13 LAB — RETIRED EF AND QEF - SEE NOTES: 60 (ref 55–65)

## 2017-12-13 PROCEDURE — 93880 EXTRACRANIAL BILAT STUDY: CPT | Mod: S$GLB,,, | Performed by: SURGERY

## 2017-12-13 PROCEDURE — 99999 PR PBB SHADOW E&M-EST. PATIENT-LVL III: CPT | Mod: PBBFAC,,, | Performed by: SURGERY

## 2017-12-13 PROCEDURE — 99024 POSTOP FOLLOW-UP VISIT: CPT | Mod: S$GLB,,, | Performed by: SURGERY

## 2017-12-13 NOTE — PROGRESS NOTES
Patient ID: Mina Jo is a 57 y.o. male.    I. HISTORY     Chief Complaint: L CEA, 4 weeks postop check       HPI   Mina Jo is a 57 y.o. man with sever symptomatic left carotid stenosis (80-99%) and L MCA stroke s/p L CEA on 11/17/17. He is here for 4 weeks follow up. He is doing very well. He reports some intermittent early am HA. He denies any stroke like sx or TIA. No weakness, numbness, speech difficulty or vision changes. He is on ASA, plavix and statin. He still smoke 2 cigarettes a day.    Interval HPI from 11/14/14:  referred from Dr JOSÉ MIGUEL Benoit with recent left MCA stroke (9/24/17), CAD s/p CABG x3 ('09), PAD s/p PTA R EIA / MARTÍNEZ ('09) who was referred for symptomatic severe carotid stenosis.     His CVA in Sept '17 he had sudden onset aphasia, facial droop and right arm weakness. Found to have L MCA thrombosis. Given tPa with resolution of his deficits.   He reports since released from the hospital he has had no focal neurological deficits. No blurred vision.     He is a current smoker -- 2 ppd x 20 yrs  Stress echo in June '17 with no evidence of ischemia  On aspirin / Plavix / statin  Reports occasional cocaine    No reported symptoms of claudication per the patient  No chest pain or SOB with exertion    Review of Systems   Constitution: Negative for chills, decreased appetite and fever.   HENT: Negative.    Eyes: Negative for blurred vision and double vision.   Cardiovascular: Negative for chest pain, claudication, dyspnea on exertion, irregular heartbeat, leg swelling and palpitations.   Respiratory: Negative for cough and shortness of breath.    Endocrine: Negative for cold intolerance and heat intolerance.   Musculoskeletal: Negative for joint pain.   Gastrointestinal: Negative for abdominal pain and constipation.   Neurological: Positive for headaches. Negative for difficulty with concentration, focal weakness, light-headedness, numbness, paresthesias, seizures and sensory change.    Psychiatric/Behavioral: Negative.        II. PHYSICAL EXAM     Right Arm BP - Sittin/71 (17 1506)  Left Arm BP - Sittin/70 (17 1506)  Pulse: 95  Temp: 98 °F (36.7 °C)  Body mass index is 22.38 kg/m².      Physical Exam   Constitutional: He is oriented to person, place, and time. He appears well-developed. No distress.   HENT:   Head: Normocephalic and atraumatic.   Eyes: Pupils are equal, round, and reactive to light. No scleral icterus.   Neck: Normal range of motion. Neck supple. No JVD present.   No carotid bruits  Wound clean dry intact and healing well  +2 pulse    Cardiovascular: Normal rate and regular rhythm.  Exam reveals no gallop and no friction rub.    No murmur heard.  Pulmonary/Chest: Effort normal and breath sounds normal. No respiratory distress.   Abdominal: Soft. Bowel sounds are normal. There is no tenderness. There is no rebound.   Musculoskeletal: Normal range of motion. He exhibits no edema.        Lymphadenopathy:     He has no cervical adenopathy.   Neurological: He is alert and oriented to person, place, and time. No cranial nerve deficit. Coordination normal.   Vitals reviewed.      Imaging Results:    Carotid Duplex 17:  R L   50-69% , EDV 57  PSV prox 90 and distal 81, EDV prox 33 and distal 37          III. ASSESSMENT & PLAN (MEDICAL DECISION MAKING)     1. Cerebral infarction due to thrombosis of left middle cerebral artery    2. Bilateral carotid artery stenosis        Mina Jo is a 57 y.o. CAD (s/p CABG), PAD (s/p PTA) and severe symptomatic left carotid stenosis (80-99%) s/p L CEA on 17.He is at 4 weeks postop and doing well.       Continue aspirin / Plavix / statin  RCT in 1 year with carotid duplex  Smoking cessation counseling     Sanjana Patel MD FACS [facility]  Vascular & Endovascular Surgery

## 2017-12-14 PROBLEM — F14.10 COCAINE ABUSE: Status: RESOLVED | Noted: 2017-09-26 | Resolved: 2017-12-14

## 2018-01-09 ENCOUNTER — OFFICE VISIT (OUTPATIENT)
Dept: NEUROLOGY | Facility: CLINIC | Age: 58
End: 2018-01-09
Payer: COMMERCIAL

## 2018-01-09 ENCOUNTER — LAB VISIT (OUTPATIENT)
Dept: LAB | Facility: HOSPITAL | Age: 58
End: 2018-01-09
Attending: INTERNAL MEDICINE
Payer: COMMERCIAL

## 2018-01-09 VITALS
WEIGHT: 169 LBS | SYSTOLIC BLOOD PRESSURE: 110 MMHG | DIASTOLIC BLOOD PRESSURE: 73 MMHG | BODY MASS INDEX: 22.4 KG/M2 | HEIGHT: 73 IN | HEART RATE: 91 BPM

## 2018-01-09 DIAGNOSIS — I63.9 CEREBROVASCULAR ACCIDENT (CVA), UNSPECIFIED MECHANISM: ICD-10-CM

## 2018-01-09 DIAGNOSIS — I63.232 STROKE DUE TO STENOSIS OF LEFT CAROTID ARTERY: ICD-10-CM

## 2018-01-09 PROBLEM — I63.412 CEREBRAL INFARCTION DUE TO EMBOLISM OF LEFT MIDDLE CEREBRAL ARTERY: Status: ACTIVE | Noted: 2017-09-25

## 2018-01-09 PROBLEM — I63.132: Status: ACTIVE | Noted: 2017-09-25

## 2018-01-09 LAB
ALBUMIN SERPL BCP-MCNC: 3.5 G/DL
ALP SERPL-CCNC: 99 U/L
ALT SERPL W/O P-5'-P-CCNC: 27 U/L
ANION GAP SERPL CALC-SCNC: 8 MMOL/L
AST SERPL-CCNC: 24 U/L
BILIRUB SERPL-MCNC: 0.7 MG/DL
BUN SERPL-MCNC: 13 MG/DL
CALCIUM SERPL-MCNC: 9.1 MG/DL
CHLORIDE SERPL-SCNC: 106 MMOL/L
CHOLEST SERPL-MCNC: 219 MG/DL
CHOLEST/HDLC SERPL: 6.1 {RATIO}
CO2 SERPL-SCNC: 24 MMOL/L
CREAT SERPL-MCNC: 0.8 MG/DL
EST. GFR  (AFRICAN AMERICAN): >60 ML/MIN/1.73 M^2
EST. GFR  (NON AFRICAN AMERICAN): >60 ML/MIN/1.73 M^2
GLUCOSE SERPL-MCNC: 102 MG/DL
HDLC SERPL-MCNC: 36 MG/DL
HDLC SERPL: 16.4 %
LDLC SERPL CALC-MCNC: 104 MG/DL
NONHDLC SERPL-MCNC: 183 MG/DL
POTASSIUM SERPL-SCNC: 4.1 MMOL/L
PROT SERPL-MCNC: 7.5 G/DL
SODIUM SERPL-SCNC: 138 MMOL/L
TRIGL SERPL-MCNC: 395 MG/DL

## 2018-01-09 PROCEDURE — 99215 OFFICE O/P EST HI 40 MIN: CPT | Mod: S$GLB,,, | Performed by: PSYCHIATRY & NEUROLOGY

## 2018-01-09 PROCEDURE — 80053 COMPREHEN METABOLIC PANEL: CPT

## 2018-01-09 PROCEDURE — 80061 LIPID PANEL: CPT

## 2018-01-09 PROCEDURE — 36415 COLL VENOUS BLD VENIPUNCTURE: CPT

## 2018-01-09 PROCEDURE — 99999 PR PBB SHADOW E&M-EST. PATIENT-LVL III: CPT | Mod: PBBFAC,,, | Performed by: PSYCHIATRY & NEUROLOGY

## 2018-01-09 NOTE — PROGRESS NOTES
Vascular Neurology  Clinic Note      Reason for Consult (Chief Complaint): Follow up    SUBJECTIVE:     History of Present Illness:  Patient is a 57 y.o. male who presents to clinic today as a follow up.  Patient had an embolic stroke to L MCA territory due to high grade stenosis of the left internal carotid artery.  He was successfully treated after discharge with CEA.  Reports has not had any recurrent episodes and has been compliant with his medications.  Has continued to smoke and is trying to quit, but no more cocaine.        Past Medical History:   Diagnosis Date    Cerebral infarction due to embolism of left middle cerebral artery 9/25/2017    Coronary artery disease     Stroke     Stroke due to stenosis of left carotid artery 1/9/2018    Stroke due to stenosis of left carotid artery 1/9/2018     Past Surgical History:   Procedure Laterality Date    BYPASS GRAFT      triple bypass 2009    CARDIAC SURGERY      triple bypass x 2    ROTATOR CUFF REPAIR Right      Family History   Problem Relation Age of Onset    No Known Problems Mother      DM II    No Known Problems Father      Heart Disease, MI    No Known Problems Brother      Heart Disease, DM II     Social History   Substance Use Topics    Smoking status: Current Every Day Smoker     Packs/day: 1.50     Years: 20.00     Types: Cigarettes    Smokeless tobacco: Never Used    Alcohol use Yes      Comment: beer daily     Review of patient's allergies indicates:  No Known Allergies    Medications:   I have reviewed the current medication administration record.  Antiplatelet therapy:  Plavix 75mg, Aspirin 325mg  Statin therapy: Atorvastatin- 80 mg oral daily  For a complete list of medications please see the medication list.    Review of Systems:  Constitutional: no fever, no chills, no fatigue  Eyes: no eyesight worsening, no double vision, no eye pain  ENT/Mouth: no nasal congestion or nose bleeds, no sore throat or hoarseness.  Cardiovascular:  "no chest pain w/exertion, no palpitations, no leg pain while walking, no irregular heartbeat  Respiratory: no cough or shortness of breath, no coughing up blood  Gastrointestinal: no nausea or vomiting, no abdominal pain or change in bowel habits, no black/bloody stools  Genitourinary: no frequent voiding or blood in urine  Musculoskeletal: no joint pain, no muscle pain   Skin/Breast: negative for rash or skin lesions  Hematologic: no easy bruising  Neurological: no headaches, dizziness, or confusion  Sleep: negative for snoring or breath holding  Psychiatric: no auditory or visual hallucinations, no anxiety, no depression/worrying alot  Endocrine: no heat or cold intolerance, no excessive thirst    OBJECTIVE:     Vital Signs (Most Recent):  /73   Pulse 91   Ht 6' 1" (1.854 m)   Wt 76.7 kg (169 lb)   BMI 22.30 kg/m²     Physical Exam:  General: well developed, well nourished  Head/Neck: atraumatic, thyroid normal, no palpable mass  Eyes: fundus normal, no vessel changes  Respiratory: clear to auscultation  Vascular: no carotid bruits  Cardiac: regular rate and rhythm  Abdomen: soft, non-tender  Skin: negative for bruises or rash    Scores:  NIHSS: 0 (01/09/18 0812)   ABCD2:     Modified [place]: No Significant Disability (01/09/18 0812)   CHADS2:       Neurological Exam:   LOC: alert and follows requests  Language: No aphasia  Speech: No dysarthria  Orientation: Person, Place, Time  Memory: Recent memory intact, Remote memory intact, Age correct, Month correct  Visual Fields (CN II): Full  EOM (CN III, IV, VI): Full/intact  Oculocephalics: normal  Pupils (CN III, IV, VI): PERRL  Facial Sensation (CN V): Symmetric, Corneal reflex intact  Facial Movement (CN VII): symmetric facial expression  Hearing (CN VIII): intact bilaterally  Gag Reflex (CN IX, X): normal/symmetric  Shoulder/Neck (CN XI): SCM-Left: Normal ; SCM-Right: Normal ; Shoulder Shrug: Normal/Symetric  Tongue (CN XII): to midline  Reflexes: flexor " plantar responses bilaterally and 2+ throughout  Motor*: Arm Left:  Normal (5/5), Leg Left:   Normal (5/5), Arm Right:   Normal (5/5), Leg Right:   Normal (5/5)  Cerebellar*: Normal limb, Normal gait  , Normal stance  Sensation: intact to light touch, temperature and vibration  Tone: Arm-Left: normal; Leg-Left: normal; Arm-Right: normal; Leg-Right: normal  Very mild and subtle word finding difficulty.    Laboratory:  BMP:   Lab Results   Component Value Date     11/10/2017    K 3.9 11/10/2017     11/10/2017    CO2 24 11/10/2017    BUN 14 11/10/2017    CREATININE 0.7 11/10/2017    CALCIUM 9.1 11/10/2017     CBC:   Lab Results   Component Value Date    WBC 9.92 11/11/2017    RBC 4.46 (L) 11/11/2017    HGB 14.0 11/11/2017    HCT 41.5 11/11/2017    HCT 44 09/24/2017     11/11/2017    MCV 93 11/11/2017    MCH 31.4 (H) 11/11/2017    MCHC 33.7 11/11/2017     Lipid Panel:   Lab Results   Component Value Date    CHOL 197 11/03/2017    LDLCALC 103.0 11/03/2017    HDL 37 (L) 11/03/2017    TRIG 285 (H) 11/03/2017     Coagulation:   Lab Results   Component Value Date    INR 1.0 11/10/2017    APTT 23.6 11/06/2017     Hgb A1C:   Lab Results   Component Value Date    HGBA1C 5.2 09/25/2017     TSH:   Lab Results   Component Value Date    TSH 2.389 09/25/2017       Diagnostic Results:  Brain Imaging: MRI Head. Date: 9.2017  Acute Pathology: Infarct  Location: scattered L MCA    Cerebrovascular Imaging: CTA Head. Date: 9.2017  Intracranial Pathology: High-grade stenosis  Location: Carotid:  both      ASSESSMENT/PLAN:     Diagnosis:  Stroke due to stenosis of the L ICA causing embolism to L MCA  Stroke Risk Factors:  305.11 Smoker  Effects of Stroke:  Very mild subtle aphasia    Recommendations:  Antithrombotics: Aspirin: 325 mg oral daily  Clopidogrel: 75 mg oral daily  Statins: Atorvastatin- 80 mg oral daily  Risk factor modification. Smoking cessation.     Patient/Family Stroke Education    I have discussed the  patient's stroke risk factors and the importance to modify them in order to reduce the risk of future events.  Also, the importance of a healthy diet and daily exercise in order to reduce the risk of future strokes.    I went over the usual stroke warning signs and symptoms, and the need to activate EMS (call 911) as soon as the symptoms present.  - Sudden onset numbness or weakness of the face, arm, or leg; especially on one side of the body  - Sudden confusion, trouble speaking, or understanding  - Sudden trouble seeing in one or both eyes  - Sudden trouble walking, dizziness, loss of coordination  - Sudden severe headache with no known cause      Marty Ricks MD  Vascular and Interventional Neurology Staff  Director of Comprehensive Stroke Center  [facility]  [phone]

## 2018-09-18 ENCOUNTER — TELEPHONE (OUTPATIENT)
Dept: CARDIOLOGY | Facility: CLINIC | Age: 58
End: 2018-09-18

## 2018-09-18 ENCOUNTER — HOSPITAL ENCOUNTER (EMERGENCY)
Facility: HOSPITAL | Age: 58
Discharge: HOME | End: 2018-09-18
Attending: EMERGENCY MEDICINE
Payer: COMMERCIAL

## 2018-09-18 VITALS
WEIGHT: 168 LBS | HEIGHT: 72 IN | HEART RATE: 72 BPM | BODY MASS INDEX: 22.75 KG/M2 | RESPIRATION RATE: 19 BRPM | OXYGEN SATURATION: 98 % | DIASTOLIC BLOOD PRESSURE: 87 MMHG | SYSTOLIC BLOOD PRESSURE: 162 MMHG | TEMPERATURE: 99 F

## 2018-09-18 DIAGNOSIS — I65.23 CAROTID STENOSIS, ASYMPTOMATIC, BILATERAL: Primary | ICD-10-CM

## 2018-09-18 DIAGNOSIS — Z98.890 HISTORY OF CAROTID ENDARTERECTOMY: Primary | ICD-10-CM

## 2018-09-18 DIAGNOSIS — R07.9 CHEST PAIN: ICD-10-CM

## 2018-09-18 LAB
ALBUMIN SERPL BCP-MCNC: 3.8 G/DL
ALP SERPL-CCNC: 90 U/L
ALT SERPL W/O P-5'-P-CCNC: 24 U/L
ANION GAP SERPL CALC-SCNC: 9 MMOL/L
AST SERPL-CCNC: 20 U/L
BASOPHILS # BLD AUTO: 0.04 K/UL
BASOPHILS NFR BLD: 0.5 %
BILIRUB SERPL-MCNC: 0.4 MG/DL
BNP SERPL-MCNC: 31 PG/ML
BUN SERPL-MCNC: 14 MG/DL
CALCIUM SERPL-MCNC: 9.3 MG/DL
CHLORIDE SERPL-SCNC: 107 MMOL/L
CO2 SERPL-SCNC: 23 MMOL/L
CREAT SERPL-MCNC: 0.9 MG/DL
DIFFERENTIAL METHOD: ABNORMAL
EOSINOPHIL # BLD AUTO: 0.1 K/UL
EOSINOPHIL NFR BLD: 1.5 %
ERYTHROCYTE [DISTWIDTH] IN BLOOD BY AUTOMATED COUNT: 12.8 %
EST. GFR  (AFRICAN AMERICAN): >60 ML/MIN/1.73 M^2
EST. GFR  (NON AFRICAN AMERICAN): >60 ML/MIN/1.73 M^2
GLUCOSE SERPL-MCNC: 91 MG/DL
HCT VFR BLD AUTO: 43.9 %
HGB BLD-MCNC: 15.3 G/DL
IMM GRANULOCYTES # BLD AUTO: 0.01 K/UL
IMM GRANULOCYTES NFR BLD AUTO: 0.1 %
LYMPHOCYTES # BLD AUTO: 2.7 K/UL
LYMPHOCYTES NFR BLD: 31.7 %
MCH RBC QN AUTO: 32.6 PG
MCHC RBC AUTO-ENTMCNC: 34.9 G/DL
MCV RBC AUTO: 93 FL
MONOCYTES # BLD AUTO: 0.6 K/UL
MONOCYTES NFR BLD: 6.6 %
NEUTROPHILS # BLD AUTO: 5.1 K/UL
NEUTROPHILS NFR BLD: 59.6 %
NRBC BLD-RTO: 0 /100 WBC
PLATELET # BLD AUTO: 221 K/UL
PMV BLD AUTO: 10 FL
POTASSIUM SERPL-SCNC: 3.5 MMOL/L
PROT SERPL-MCNC: 7.5 G/DL
RBC # BLD AUTO: 4.7 M/UL
SODIUM SERPL-SCNC: 139 MMOL/L
TROPONIN I SERPL DL<=0.01 NG/ML-MCNC: <0.006 NG/ML
TROPONIN I SERPL DL<=0.01 NG/ML-MCNC: <0.006 NG/ML
WBC # BLD AUTO: 8.6 K/UL

## 2018-09-18 PROCEDURE — 93010 ELECTROCARDIOGRAM REPORT: CPT | Mod: ,,, | Performed by: INTERNAL MEDICINE

## 2018-09-18 PROCEDURE — 83880 ASSAY OF NATRIURETIC PEPTIDE: CPT

## 2018-09-18 PROCEDURE — 84484 ASSAY OF TROPONIN QUANT: CPT | Mod: 91

## 2018-09-18 PROCEDURE — 80053 COMPREHEN METABOLIC PANEL: CPT

## 2018-09-18 PROCEDURE — 85025 COMPLETE CBC W/AUTO DIFF WBC: CPT

## 2018-09-18 PROCEDURE — 99284 EMERGENCY DEPT VISIT MOD MDM: CPT | Mod: ,,, | Performed by: EMERGENCY MEDICINE

## 2018-09-18 PROCEDURE — 99284 EMERGENCY DEPT VISIT MOD MDM: CPT | Mod: 25

## 2018-09-18 NOTE — HPI
58M with h/o left MCA stroke (9/24/17), CAD s/p CABG x3 ('09), PAD s/p PTA R EIA / MARTÍNEZ ('09) s/p L CEA 11/17 who presents to the ED complaining of CP, SOB, and episodes of swelling and soreness over prior L CEA incision.     He states his episode of chest pain began over the weekend. He was sitting down relaxing when he noticed pain over his left chest. He describes the pain as substernal, sharp, non radiating and constant for 8-10 min. It resolved on its own, but recurred a couple of times since. He believes these episodes were associated with stress form his job and from caring for his special needs daughter. Additionally, he states he has noted increased tenderness over his L CEA incision for the past week. He states that he had one episode in particular where this area become swollen, pulsatile, and painful, but subsequently resolved on its own. He denies residual swelling, but claims the tenderness persisted, although it is not currently tender. He denies any dizziness, weakness, focal neurological deficits, or changes in vision.    Patient's cardiopulmonary workup negative to this point. Vascular Surgery consulted to evaluate L CEA incision.

## 2018-09-18 NOTE — TELEPHONE ENCOUNTER
----- Message from Lisa Gary sent at 9/18/2018  9:34 AM CDT -----  Contact: pt  Pt called to schedule his recall and appt and then he asked for a sooner appt. He states for over 1 week he has been having SOB, throbbing veins in his neck, pains in his chest, and just weak. Pt advised to go to ER, but he wants to talk to the doctor.  LOV 11/3/17 Dr. Zimmerman    Thanks

## 2018-09-18 NOTE — TELEPHONE ENCOUNTER
Pt said that for the last week he has been feeling very weak,c/o c/p and states that the veins in his neck are buldging.Reports that he is SOB,feels his sx's are similar to when he had a stroke.Advised the pt go to the ED ASAP to be treated.Pt reports understanding of these instructions.

## 2018-09-18 NOTE — CONSULTS
[facility]  Vascular Surgery  Consult Note    Consults  Subjective:     Chief Complaint/Reason for Admission: CP, SOB, Neck tenderness    History of Present Illness: 58M  with h/o left MCA stroke (9/24/17), CAD s/p CABG x3 ('09), PAD s/p PTA R EIA / MARTÍNEZ ('09) s/p L CEA 11/17 who presents to the ED complaining of CP, SOB, and episodes of swelling and soreness over prior L CEA incision.     He states his episode of chest pain began over the weekend. He was sitting down relaxing when he noticed pain over his left chest. He describes the pain as substernal, sharp, non radiating and constant for 8-10 min. It resolved on its own, but recurred a couple of times since. He believes these episodes were associated with stress form his job and from caring for his special needs daughter. Additionally, he states he has noted increased tenderness over his L CEA incision for the past week. He states that he had one episode in particular where this area become swollen, pulsatile, and painful, but subsequently resolved on its own. He denies residual swelling, but claims the tenderness persisted, although it is not currently tender. He denies any dizziness, weakness, focal neurological deficits, or changes in vision.    Patient's cardiopulmonary workup negative to this point. Vascular Surgery consulted to evaluate L CEA incision.             (Not in a hospital admission)    Review of patient's allergies indicates:  No Known Allergies    Past Medical History:   Diagnosis Date    Cerebral infarction due to embolism of left middle cerebral artery 9/25/2017    Coronary artery disease     Stroke     Stroke due to stenosis of left carotid artery 1/9/2018    Stroke due to stenosis of left carotid artery 1/9/2018     Past Surgical History:   Procedure Laterality Date    BYPASS GRAFT      triple bypass 2009    CARDIAC SURGERY      triple bypass x 2    Endarterectomy-Carotid Left 11/17/2017    Performed by Sanjana SANCHEZ  MD Jorge at [facility] OR 2ND [facility]    ROTATOR CUFF REPAIR Right      Family History     Problem Relation (Age of Onset)    No Known Problems Mother, Father, Brother        Tobacco Use    Smoking status: Current Every Day Smoker     Packs/day: 0.75     Years: 20.00     Pack years: 15.00     Types: Cigarettes    Smokeless tobacco: Never Used   Substance and Sexual Activity    Alcohol use: Yes     Comment: beer daily    Drug use: No    Sexual activity: Not Currently     Partners: Female     Review of Systems   Constitutional: Negative for activity change and appetite change.   HENT:        Left neck pain   Eyes: Negative for visual disturbance.   Respiratory: Positive for shortness of breath. Negative for chest tightness and wheezing.    Cardiovascular: Positive for chest pain. Negative for palpitations.   Gastrointestinal: Negative for abdominal distention and abdominal pain.   Musculoskeletal: Negative.    Skin: Negative for color change, pallor and wound.   Psychiatric/Behavioral:        + anxiety     Objective:     Vital Signs (Most Recent):  Temp: 98.8 °F (37.1 °C) (09/18/18 1525)  Pulse: 75 (09/18/18 1732)  Resp: 20 (09/18/18 1732)  BP: (!) 157/81 (09/18/18 1732)  SpO2: 97 % (09/18/18 1732) Vital Signs (24h Range):  Temp:  [98.8 °F (37.1 °C)] 98.8 °F (37.1 °C)  Pulse:  [74-96] 75  Resp:  [18-20] 20  SpO2:  [95 %-97 %] 97 %  BP: (139-157)/(70-81) 157/81     Weight: 76.2 kg (168 lb)  Body mass index is 22.78 kg/m².    Physical Exam   Constitutional: He is oriented to person, place, and time. He appears well-developed and well-nourished. No distress.   HENT:   Head: Normocephalic and atraumatic.   Eyes: Conjunctivae and EOM are normal.   Neck: Normal range of motion. Neck supple.   Left CEA incision well-healed  Subq scar tissue present under incision   No focal areas of swelling, fluctuance, tenderness, or redness  Carotid artery easily palpable under incision.  No bruit appreciated.    Cardiovascular: Normal rate,  regular rhythm and normal heart sounds.   Pulmonary/Chest: Effort normal and breath sounds normal. No respiratory distress.   Abdominal: Soft. He exhibits no distension and no mass. There is no tenderness.   Musculoskeletal:   Radial a 2+ bilaterally  Femoral a 2+ bilaterally   Pedal pulses 2+ bilaterally     Neurological: He is alert and oriented to person, place, and time.   Skin: Skin is warm and dry.   Psychiatric: His mood appears anxious.       Significant Labs:  Cardiac markers:   Recent Labs   Lab  09/18/18   1630   TROPONINI  <0.006     CBC:   Recent Labs   Lab  09/18/18   1630   WBC  8.60   RBC  4.70   HGB  15.3   HCT  43.9   PLT  221   MCV  93   MCH  32.6*   MCHC  34.9     CMP:   Recent Labs   Lab  09/18/18   1630   GLU  91   CALCIUM  9.3   ALBUMIN  3.8   PROT  7.5   NA  139   K  3.5   CO2  23   CL  107   BUN  14   CREATININE  0.9   ALKPHOS  90   ALT  24   AST  20   BILITOT  0.4       Significant Diagnostics:  CXR: X-ray Chest Pa And Lateral    Result Date: 9/18/2018  1. No acute cardiopulmonary process. Electronically signed by: Michael Banks MD Date:    09/18/2018 Time:    17:24    Assessment/Plan:     History of carotid endarterectomy    57M with h/o left MCA stroke (9/24/17), CAD s/p CABG x3 ('09), PAD s/p PTA R EIA / MARTÍNEZ ('09) s/p L CEA 11/17 who presents to the ED complaining of CP, SOB, and episodes of swelling and soreness over prior L CEA incision.    - Reassuring clinical exam - no focal swelling, fluctuance, purulence, or pulsatile mass/concern for pseudoaneurysm.   - Patient reassured and comfortable following up as outpatient, which would be the best course of action at this time.   - Patient will follow up next Monday with VAS carotid duplex (order placed)            Thank you for your consult. I will sign off. Please contact us if you have any additional questions.    Romero Aaron MD  Vascular Surgery  [facility]

## 2018-09-18 NOTE — ED PROVIDER NOTES
Encounter Date: 9/18/2018       History     Chief Complaint   Patient presents with    Chest Pain     hx cabg     HPI   57M with recent left MCA stroke (9/24/17), CAD s/p CABG x3 ('09), PAD s/p PTA R EIA / MARTÍNEZ ('09) s/p L CEA 11/17 who presents to the ED complaining of swelling, soreness and tenderness over prior L CEA incision, chest pain, and increased shortness of breath.    He states his episode of chest pain began over the weekend after cooking a meal with his daughter. He was sitting down relaxing when he noticed pain over his left chest. He describes the pain as sharp, non radiating and constant for 8-10 min. It self resolved at this time but recurred a couple of times since. He believes these episodes were associated with some diaphoresis but is not sure. He denies any aggravating or relieving factors. He states he told his cardiologist about this feeling and was told to come to the ED.    Additionally, he states he has noted increased swelling and discomfort over his L CEA incision. He denies any inciting factors responsible for this. He denies any dizziness, weakness or changes in vision.        Review of patient's allergies indicates:  No Known Allergies  Past Medical History:   Diagnosis Date    Cerebral infarction due to embolism of left middle cerebral artery 9/25/2017    Coronary artery disease     Stroke     Stroke due to stenosis of left carotid artery 1/9/2018    Stroke due to stenosis of left carotid artery 1/9/2018     Past Surgical History:   Procedure Laterality Date    BYPASS GRAFT      triple bypass 2009    CARDIAC SURGERY      triple bypass x 2    Endarterectomy-Carotid Left 11/17/2017    Performed by [facility]el [facility] at [facility]    ROTATOR CUFF REPAIR Right      Family History   Problem Relation Age of Onset    No Known Problems Mother         DM II    No Known Problems Father         Heart Disease, MI    No Known Problems Brother         Heart Disease, DM II      Social History     Tobacco Use    Smoking status: Current Every Day Smoker     Packs/day: 0.75     Years: 20.00     Pack years: 15.00     Types: Cigarettes    Smokeless tobacco: Never Used   Substance Use Topics    Alcohol use: Yes     Comment: beer daily    Drug use: No     Review of Systems   Constitutional: Denies weight loss, chills, or weakness.  Eyes: denies vision changes, loss or blurry vision.   ENT: Denies dysphagia, nasal discharge, ear pain or discharge.  Cardiovascular: per HPI  Respiratory: Denies cough, hemoptysis and wheezing.  GI: Denies nausea/vomiting, hematochezia, melena, abd pain, or changes in appetite.  : Denies dysuria, incontinence, or hematuria.  Musculoskeletal: Denies joint pain or myalgias.  Skin/breast: Denies rashes, lumps, lesions, or discharge.  Neurologic: Denies headache, dizziness, vertigo, or paresthesias.  Psychiatric: denies suicidal ideation and changes in mood. Denies depression   Endocrine: Denies polyuria, polydipsia, heat/cold intolerance.  Hematologic/Lymph: Denies lymphadenopathy, easy bruising or easy bleeding.  Extremities: denies claudication; denies edema   Allergic/Immunologic: Denies rash, rhinitis.      Physical Exam     Initial Vitals [09/18/18 1525]   BP Pulse Resp Temp SpO2   139/79 96 18 98.8 °F (37.1 °C) 97 %      MAP       --         Physical Exam     Physical Exam:  General: alert, oriented and appears stated age  HENT: NC/AT.Conjunctivae/corneas clear. PERRL, EOMI. Nares normal. Septum midline. Mucosa normal. No drainage or sinus tenderness.  Neck:small area of firm swelling under L CEA incision; no fluctuant masses appreciated  Back: symmetric, no curvature. ROM normal. No CVA tenderness.  Lungs: clear to auscultation bilaterally, respiratory effort normal  CV: RRR, S1 and S2 Normal. No chest wall tenderness  Abdomen: S, NT, ND. Bowel sounds normal   Extremities: WWP, intact distal pulses. no cyanosis or edema  Skin: Skin color, texture, turgor  normal. No rashes or lesions  Lymph nodes: Cervical, supraclavicular, and axillary nodes normal.  Neurologic: Grossly normal      ED Course   Procedures  Labs Reviewed   CBC W/ AUTO DIFFERENTIAL   COMPREHENSIVE METABOLIC PANEL   TROPONIN I   TROPONIN I   B-TYPE NATRIURETIC PEPTIDE          Imaging Results    None                APC / Resident Notes:   Vascular surgery called and evaluating area of swelling underlying CEA scar. EKG wnl, troponin and BNP normal.  Kendra Chappell MD  5:43 PM    Vascular evaluated patient; feel patient can be seen in clinic with US. Awaiting second troponin and then will likely be discharged.  .6:41 PM    2nd troponin negative, will discharge at this time with vascular surgery follow up Monday of next week.  Kendra Chappell MD  8:18 PM           Attending Attestation:   Physician Attestation Statement for Resident:  As the supervising MD  I agree with the above history. -:   As the supervising MD I agree with the above PE.    As the supervising MD I agree with the above treatment, course, plan, and disposition.  I have reviewed and agree with the residents interpretation of the following: lab data, x-rays and EKG.  I have reviewed the following: old records at this facility and old EKGs.                       Clinical Impression:   The primary encounter diagnosis was History of carotid endarterectomy. A diagnosis of Chest pain was also pertinent to this visit.      Disposition:   Disposition: Discharged  Condition: Stable                        Edward Do MD  09/19/18 [id number]

## 2018-09-18 NOTE — SUBJECTIVE & OBJECTIVE
(Not in a hospital admission)    Review of patient's allergies indicates:  No Known Allergies    Past Medical History:   Diagnosis Date    Cerebral infarction due to embolism of left middle cerebral artery 9/25/2017    Coronary artery disease     Stroke     Stroke due to stenosis of left carotid artery 1/9/2018    Stroke due to stenosis of left carotid artery 1/9/2018     Past Surgical History:   Procedure Laterality Date    BYPASS GRAFT      triple bypass 2009    CARDIAC SURGERY      triple bypass x 2    Endarterectomy-Carotid Left 11/17/2017    [facility]    ROTATOR CUFF REPAIR Right      Family History     Problem Relation (Age of Onset)    No Known Problems Mother, Father, Brother        Tobacco Use    Smoking status: Current Every Day Smoker     Packs/day: 0.75     Years: 20.00     Pack years: 15.00     Types: Cigarettes    Smokeless tobacco: Never Used   Substance and Sexual Activity    Alcohol use: Yes     Comment: beer daily    Drug use: No    Sexual activity: Not Currently     Partners: Female     Review of Systems   Constitutional: Negative for activity change and appetite change.   HENT:        Left neck pain   Eyes: Negative for visual disturbance.   Respiratory: Positive for shortness of breath. Negative for chest tightness and wheezing.    Cardiovascular: Positive for chest pain. Negative for palpitations.   Gastrointestinal: Negative for abdominal distention and abdominal pain.   Musculoskeletal: Negative.    Skin: Negative for color change, pallor and wound.   Psychiatric/Behavioral:        + anxiety     Objective:     Vital Signs (Most Recent):  Temp: 98.8 °F (37.1 °C) (09/18/18 1525)  Pulse: 75 (09/18/18 1732)  Resp: 20 (09/18/18 1732)  BP: (!) 157/81 (09/18/18 1732)  SpO2: 97 % (09/18/18 1732) Vital Signs (24h Range):  Temp:  [98.8 °F (37.1 °C)] 98.8 °F (37.1 °C)  Pulse:  [74-96] 75  Resp:  [18-20] 20  SpO2:  [95 %-97 %] 97 %  BP:  (139-157)/(70-81) 157/81     Weight: 76.2 kg (168 lb)  Body mass index is 22.78 kg/m².    Physical Exam   Constitutional: He is oriented to person, place, and time. He appears well-developed and well-nourished. No distress.   HENT:   Head: Normocephalic and atraumatic.   Eyes: Conjunctivae and EOM are normal.   Neck: Normal range of motion. Neck supple.   Left CEA incision well-healed  Subq scar tissue present under incision   No focal areas of swelling, fluctuance, tenderness, or redness  Carotid artery easily palpable under incision.  No bruit appreciated.    Cardiovascular: Normal rate, regular rhythm and normal heart sounds.   Pulmonary/Chest: Effort normal and breath sounds normal. No respiratory distress.   Abdominal: Soft. He exhibits no distension and no mass. There is no tenderness.   Musculoskeletal:   Radial a 2+ bilaterally  Femoral a 2+ bilaterally   Pedal pulses 2+ bilaterally     Neurological: He is alert and oriented to person, place, and time.   Skin: Skin is warm and dry.   Psychiatric: His mood appears anxious.       Significant Labs:  Cardiac markers:   Recent Labs   Lab  09/18/18   1630   TROPONINI  <0.006     CBC:   Recent Labs   Lab  09/18/18   1630   WBC  8.60   RBC  4.70   HGB  15.3   HCT  43.9   PLT  221   MCV  93   MCH  32.6*   MCHC  34.9     CMP:   Recent Labs   Lab  09/18/18   1630   GLU  91   CALCIUM  9.3   ALBUMIN  3.8   PROT  7.5   NA  139   K  3.5   CO2  23   CL  107   BUN  14   CREATININE  0.9   ALKPHOS  90   ALT  24   AST  20   BILITOT  0.4       Significant Diagnostics:  CXR: X-ray Chest Pa And Lateral    Result Date: 9/18/2018  1. No acute cardiopulmonary process. Electronically signed by: Michael Banks MD Date:    09/18/2018 Time:    17:24

## 2018-09-18 NOTE — ASSESSMENT & PLAN NOTE
57M with h/o left MCA stroke (9/24/17), CAD s/p CABG x3 ('09), PAD s/p PTA R EIA / MARTÍNEZ ('09) s/p L CEA 11/17 who presents to the ED complaining of CP, SOB, and episodes of swelling and soreness over prior L CEA incision.    - Reassuring clinical exam - no focal swelling, fluctuance, purulence, or pulsatile mass/concern for pseudoaneurysm.   - Patient reassured and comfortable following up as outpatient, which would be the best course of action at this time.   - Patient will follow up next Monday with VAS carotid duplex (order placed)

## 2018-09-18 NOTE — PROVIDER PROGRESS NOTES - EMERGENCY DEPT.
Encounter Date: 9/18/2018    ED Physician Progress Notes         EKG - STEMI Decision  Initial Reading: No STEMI present.    Heart Rate: 98 bpm, NSR, normal intervals, normal ECG    I, Julio Cesar Gustafson, am scribing for, and in the presence of, Dr. Fritz. I performed the above scribed service and the documentation accurately describes the services I performed. I attest to the accuracy of the note.     \I, Dr. Rodrigo Fritz, personally performed the services described in this documentation. All medical record entries made by the scribe were at my direction and in my presence.  I have reviewed the chart and agree that the record reflects my personal performance and is accurate and complete.       Rodrigo Fritz, DO  Dept of Emergency Medicine   [facility]  Spectralink: [id number]

## 2018-09-18 NOTE — ED TRIAGE NOTES
Mina Jo, a 58 y.o. male presents to the ED w/ complaint of CP, pain and swelling to L neck over carotid, that begin last night, SOB and fatigue.  Triage note:  Chief Complaint   Patient presents with    Chest Pain     hx cabg     Review of patient's allergies indicates:  No Known Allergies  Past Medical History:   Diagnosis Date    Cerebral infarction due to embolism of left middle cerebral artery 9/25/2017    Coronary artery disease     Stroke     Stroke due to stenosis of left carotid artery 1/9/2018    Stroke due to stenosis of left carotid artery 1/9/2018     LOC: Patient name and date of birth verified. The patient is awake, alert and aware of environment with an appropriate affect, the patient is oriented x 3 and speaking appropriately.   APPEARANCE: Patient is clean and well groomed, patient's clothing is properly fastened.  SKIN: The skin is warm and dry, color consistent with ethnicity, patient has normal skin turgor and moist mucus membranes, skin intact, no breakdown or bruising noted.  MUSCULOSKELETAL: Patient moving all extremities well, no obvious deformities noted.   RESPIRATORY: Respirations are spontaneous, patient has a normal effort and rate, no accessory muscle use noted.  CARDIAC: Patient has a normal rate, no periphreal edema noted, capillary refill < 3 seconds.  ABDOMEN: Soft and non tender, no distention noted.   NEUROLOGIC: Eyes open spontaneously, behavior appropriate to situation, follows commands, facial expression symmetrical, bilateral hand grasp equal and even, purposeful motor response noted, normal sensation in all extremities when touched with a finger.

## 2018-09-24 ENCOUNTER — OFFICE VISIT (OUTPATIENT)
Dept: VASCULAR SURGERY | Facility: CLINIC | Age: 58
End: 2018-09-24
Payer: COMMERCIAL

## 2018-09-24 ENCOUNTER — HOSPITAL ENCOUNTER (OUTPATIENT)
Dept: VASCULAR SURGERY | Facility: CLINIC | Age: 58
Discharge: HOME | End: 2018-09-24
Attending: STUDENT IN AN ORGANIZED HEALTH CARE EDUCATION/TRAINING PROGRAM
Payer: COMMERCIAL

## 2018-09-24 VITALS
DIASTOLIC BLOOD PRESSURE: 72 MMHG | BODY MASS INDEX: 22.4 KG/M2 | WEIGHT: 165.38 LBS | HEART RATE: 78 BPM | HEIGHT: 72 IN | SYSTOLIC BLOOD PRESSURE: 121 MMHG | TEMPERATURE: 98 F

## 2018-09-24 DIAGNOSIS — I65.23 BILATERAL CAROTID ARTERY STENOSIS: ICD-10-CM

## 2018-09-24 DIAGNOSIS — I65.23 BILATERAL CAROTID ARTERY STENOSIS: Primary | ICD-10-CM

## 2018-09-24 DIAGNOSIS — K21.9 GASTROESOPHAGEAL REFLUX DISEASE, ESOPHAGITIS PRESENCE NOT SPECIFIED: ICD-10-CM

## 2018-09-24 DIAGNOSIS — Z98.890 HISTORY OF CAROTID ENDARTERECTOMY: ICD-10-CM

## 2018-09-24 PROCEDURE — 3078F DIAST BP <80 MM HG: CPT | Mod: CPTII,S$GLB,, | Performed by: SURGERY

## 2018-09-24 PROCEDURE — 93880 EXTRACRANIAL BILAT STUDY: CPT | Mod: S$GLB,,, | Performed by: SURGERY

## 2018-09-24 PROCEDURE — 3008F BODY MASS INDEX DOCD: CPT | Mod: CPTII,S$GLB,, | Performed by: SURGERY

## 2018-09-24 PROCEDURE — 99999 PR PBB SHADOW E&M-EST. PATIENT-LVL III: CPT | Mod: PBBFAC,,, | Performed by: SURGERY

## 2018-09-24 PROCEDURE — 99214 OFFICE O/P EST MOD 30 MIN: CPT | Mod: S$GLB,,, | Performed by: SURGERY

## 2018-09-24 PROCEDURE — 3074F SYST BP LT 130 MM HG: CPT | Mod: CPTII,S$GLB,, | Performed by: SURGERY

## 2018-09-24 RX ORDER — NITROGLYCERIN 0.4 MG/1
0.4 TABLET SUBLINGUAL
Status: ON HOLD | COMMUNITY
Start: 2015-03-11 | End: 2019-07-08

## 2018-09-24 NOTE — PROGRESS NOTES
Patient ID: Mina Jo is a 58 y.o. male.    I. HISTORY     Chief Complaint: Follow-up      h/o left MCA stroke (9/24/17), CAD s/p CABG x3 ('09), PAD s/p PTA R EIA / MARTÍNEZ ('09) s/p L CEA 11/17 who presened to the ED complaining of CP, SOB, and episodes of swelling and soreness over prior L CEA incision on 9/18/18,.     He states his episode of chest pain began about a week and a half before he went to the ED. He was sitting down relaxing when he noticed pain over his left chest. He describes the pain as substernal, sharp, non radiating and constant for 8-10 min. It resolved on its own, but recurred a couple of times since. He believes these episodes were associated with stress form his job and from caring for his special needs daughter. He states that this pain that brought him to the emergency department has not returned. Cardiac workup in the ED was negative. However, for the last 3 weeks, he states that he has had a burning in his chest whenever he eats and a sensation that food gets stuck in his esophagus. He has had nausea and vomiting associated with eating for the last three weeks.    Prior to his ED visit 9/18/18, he states he has noted increased tenderness over his L CEA incision for the week prior. He states that he had one episode in particular where this area become swollen, pulsatile, and painful, but subsequently resolved on its own. He states that the episode occurred at work when he got angry with a co-woerk. Today, he denies residual swelling or pain. No erythema. Today, he denies any dizziness, weakness, focal neurological deficits, or changes in vision.         Review of Systems   Constitution: Negative for chills, fever, weakness, malaise/fatigue and night sweats.   Cardiovascular: Negative for chest pain.   Respiratory: Negative for shortness of breath.    Gastrointestinal: Positive for diarrhea, dysphagia, heartburn, nausea and vomiting. Negative for abdominal pain and constipation.    Neurological: Negative for dizziness, focal weakness, numbness and paresthesias.       II. PHYSICAL EXAM   Right Arm BP - Sittin/72 (18 0857)  Left Arm BP - Sittin/71 (18 0857)  Pulse: 78  Temp: 98.4 °F (36.9 °C)  Body mass index is 22.42 kg/m².      Physical Exam   Constitutional: He is oriented to person, place, and time. He appears well-developed and well-nourished.   HENT:   Head: Normocephalic and atraumatic.   Eyes: EOM are normal. Pupils are equal, round, and reactive to light.   Neck:   Well-healed CEA scar on left neck  No tenderness, swelling, erythema, fluctuance noted   Cardiovascular: Normal rate and regular rhythm.   Pulmonary/Chest: Effort normal. No respiratory distress.   Abdominal: Soft. He exhibits no distension.   Neurological: He is alert and oriented to person, place, and time. No cranial nerve deficit.   Skin: Skin is warm and dry.   Psychiatric: He has a normal mood and affect. His behavior is normal. Judgment and thought content normal.       III. ASSESSMENT & PLAN (MEDICAL DECISION MAKING)     1. Bilateral carotid artery stenosis        Imaging Results:  Carotid Duplex 18:   R L     50-60% , EDV 68 Endarterectomized         Assessment/Diagnosis and Plan:    58-year-old male h/o left MCA stroke (17), CAD s/p CABG x3 ('09), PAD s/p PTA R EIA / MARTÍNEZ ('09) s/p L CEA  who presened to the ED complaining of CP, SOB, and episodes of swelling and soreness over prior L CEA incision on 18    No problems based on carotid duplex.   Left neck swelling has resolved.  PCP referral for CLARKE Patel MD FACS [facility]  Vascular & Endovascular Surgery

## 2018-11-29 ENCOUNTER — OFFICE VISIT (OUTPATIENT)
Dept: INTERNAL MEDICINE | Facility: CLINIC | Age: 58
End: 2018-11-29
Payer: COMMERCIAL

## 2018-11-29 VITALS
DIASTOLIC BLOOD PRESSURE: 50 MMHG | OXYGEN SATURATION: 96 % | BODY MASS INDEX: 28.25 KG/M2 | HEART RATE: 94 BPM | HEIGHT: 65 IN | SYSTOLIC BLOOD PRESSURE: 90 MMHG | WEIGHT: 169.56 LBS

## 2018-11-29 DIAGNOSIS — I73.9 PERIPHERAL VASCULAR DISEASE: ICD-10-CM

## 2018-11-29 DIAGNOSIS — I95.9 HYPOTENSION, UNSPECIFIED HYPOTENSION TYPE: ICD-10-CM

## 2018-11-29 DIAGNOSIS — Z00.00 HEALTHCARE MAINTENANCE: ICD-10-CM

## 2018-11-29 DIAGNOSIS — I63.412 CEREBROVASCULAR ACCIDENT (CVA) DUE TO EMBOLISM OF LEFT MIDDLE CEREBRAL ARTERY: ICD-10-CM

## 2018-11-29 DIAGNOSIS — K21.9 GASTROESOPHAGEAL REFLUX DISEASE, ESOPHAGITIS PRESENCE NOT SPECIFIED: ICD-10-CM

## 2018-11-29 DIAGNOSIS — F41.9 ANXIETY: Primary | ICD-10-CM

## 2018-11-29 DIAGNOSIS — Z72.0 TOBACCO ABUSE: ICD-10-CM

## 2018-11-29 DIAGNOSIS — I63.9 CEREBROVASCULAR ACCIDENT (CVA), UNSPECIFIED MECHANISM: ICD-10-CM

## 2018-11-29 DIAGNOSIS — Z11.59 NEED FOR HEPATITIS C SCREENING TEST: ICD-10-CM

## 2018-11-29 DIAGNOSIS — I25.10 CORONARY ARTERY DISEASE INVOLVING NATIVE CORONARY ARTERY OF NATIVE HEART WITHOUT ANGINA PECTORIS: ICD-10-CM

## 2018-11-29 PROCEDURE — 3008F BODY MASS INDEX DOCD: CPT | Mod: CPTII,S$GLB,, | Performed by: STUDENT IN AN ORGANIZED HEALTH CARE EDUCATION/TRAINING PROGRAM

## 2018-11-29 PROCEDURE — 99214 OFFICE O/P EST MOD 30 MIN: CPT | Mod: S$GLB,,, | Performed by: STUDENT IN AN ORGANIZED HEALTH CARE EDUCATION/TRAINING PROGRAM

## 2018-11-29 PROCEDURE — 3078F DIAST BP <80 MM HG: CPT | Mod: CPTII,S$GLB,, | Performed by: STUDENT IN AN ORGANIZED HEALTH CARE EDUCATION/TRAINING PROGRAM

## 2018-11-29 PROCEDURE — 3074F SYST BP LT 130 MM HG: CPT | Mod: CPTII,S$GLB,, | Performed by: STUDENT IN AN ORGANIZED HEALTH CARE EDUCATION/TRAINING PROGRAM

## 2018-11-29 PROCEDURE — 99999 PR PBB SHADOW E&M-EST. PATIENT-LVL IV: CPT | Mod: PBBFAC,,, | Performed by: STUDENT IN AN ORGANIZED HEALTH CARE EDUCATION/TRAINING PROGRAM

## 2018-11-29 RX ORDER — ATORVASTATIN CALCIUM 80 MG/1
80 TABLET, FILM COATED ORAL NIGHTLY
Qty: 90 TABLET | Refills: 3 | Status: SHIPPED | OUTPATIENT
Start: 2018-11-29 | End: 2019-10-25 | Stop reason: SDUPTHER

## 2018-11-29 RX ORDER — SERTRALINE HYDROCHLORIDE 25 MG/1
25 TABLET, FILM COATED ORAL DAILY
Qty: 30 TABLET | Refills: 11 | Status: SHIPPED | OUTPATIENT
Start: 2018-11-29 | End: 2021-11-30

## 2018-11-29 RX ORDER — CLOPIDOGREL BISULFATE 75 MG/1
75 TABLET ORAL DAILY
Qty: 90 TABLET | Refills: 3 | Status: SHIPPED | OUTPATIENT
Start: 2018-11-29 | End: 2019-10-25 | Stop reason: SDUPTHER

## 2018-11-29 RX ORDER — OMEPRAZOLE 40 MG/1
40 CAPSULE, DELAYED RELEASE ORAL DAILY
Qty: 30 CAPSULE | Refills: 11 | Status: SHIPPED | OUTPATIENT
Start: 2018-11-29 | End: 2021-11-30

## 2018-11-29 RX ORDER — NAPROXEN SODIUM 220 MG/1
81 TABLET, FILM COATED ORAL DAILY
COMMUNITY

## 2018-11-29 NOTE — PROGRESS NOTES
INTERNAL MEDICINE RESIDENT CLINIC  CLINIC NOTE    Patient Name: Mina Jo  YOB: 1960    PRESENTING HISTORY       History of Present Illness:  Mr. Mina Jo is a 58 y.o. male w/ significant PMHx of left MCA stroke (9/24/17), CAD s/p CABG x3 (2009), PAD s/p PTA R EIA / MARTÍNEZ ('09), L carotid stenosis s/p L CEA 11/17 who presented to the ED on 9/18/18 for neck pain, soreness and tenderness over prior L CEA incision, as well as chest pain. Troponins at that time negative x 2 and he was evaluated by vascular surgery then discharged & evaluated w/ carotid US.     Today he states his neck pain only occurs when he gets anxious at work due to the stress of his job. He denies that caring for his daughter w/ gold has been adding to the stress. He admits to eating poorly and not performing structured exercise.   Additionally, he states he has noticed burning in his chest whenever he eats, which is exacerbated when he lies down. He has tried zantac which helped but he still had symptoms.     Review of Systems:  Constitutional: no fever or chills  Eyes: no visual changes  ENT: no nasal congestion or sore throat  Respiratory: no cough or shortness of breath  Cardiovascular: no chest pain   Gastrointestinal: positive for retrosternal burning & nausea after eating, no vomiting, no abdominal pain or change in bowel habits  Genitourinary: no dysuria  Musculoskeletal: no arthralgias or myalgias  Neurological: no numbness    PAST HISTORY:     Past Medical History:   Diagnosis Date    Cerebral infarction due to embolism of left middle cerebral artery 9/25/2017    Coronary artery disease     Stroke     Stroke due to stenosis of left carotid artery 1/9/2018    Stroke due to stenosis of left carotid artery 1/9/2018       Past Surgical History:   Procedure Laterality Date    BYPASS GRAFT      triple bypass 2009    CARDIAC SURGERY      triple bypass x 2    Endarterectomy-Carotid Left 11/17/2017     [facility]    ROTATOR CUFF REPAIR Right        Family History   Problem Relation Age of Onset    Stroke Mother     Heart disease Mother     Diabetes Mother     Heart disease Father     Heart disease Brother 56    Cancer Neg Hx     Kidney disease Neg Hx     Hypertension Neg Hx        Social History     Socioeconomic History    Marital status:      Spouse name: None    Number of children: None    Years of education: None    Highest education level: None   Social Needs    Financial resource strain: None    Food insecurity - worry: None    Food insecurity - inability: None    Transportation needs - medical: None    Transportation needs - non-medical: None   Occupational History    None   Tobacco Use    Smoking status: Current Every Day Smoker     Packs/day: 1.00     Years: 20.00     Pack years: 20.00     Types: Cigarettes    Smokeless tobacco: Never Used   Substance and Sexual Activity    Alcohol use: Yes     Comment: beer daily    Drug use: No    Sexual activity: Not Currently     Partners: Female   Other Topics Concern    None   Social History Narrative    Works as a  for [employer]   [employer] 102/64    MEDICATIONS & ALLERGIES:     Current Outpatient Medications on File Prior to Visit   Medication Sig    aspirin 81 MG Chew Take 81 mg by mouth once daily.    nitroGLYCERIN (NITROSTAT) 0.4 MG SL tablet Place 0.4 mg under the tongue.    [DISCONTINUED] amLODIPine (NORVASC) 5 MG tablet TAKE 1 TABLET (5 MG TOTAL) BY MOUTH ONCE DAILY.    [DISCONTINUED] atorvastatin (LIPITOR) 80 MG tablet Take 1 tablet (80 mg total) by mouth every evening.    [DISCONTINUED] clopidogrel (PLAVIX) 75 mg tablet Take 1 tablet (75 mg total) by mouth once daily.    [DISCONTINUED] lisinopril (PRINIVIL,ZESTRIL) 5 MG tablet Take 1 tablet (5 mg total) by mouth once daily. (Patient taking differently: Take 5 mg by mouth once daily. Takes after lunch)     "[DISCONTINUED] oxyCODONE-acetaminophen (PERCOCET) 5-325 mg per tablet Take 1 tablet by mouth every 4 (four) hours as needed for Pain.    [DISCONTINUED] sertraline (ZOLOFT) 25 MG tablet Take 1 tablet (25 mg total) by mouth every evening.    [DISCONTINUED] tadalafil (CIALIS) 20 MG Tab Take 1 tablet (20 mg total) by mouth once daily.    [DISCONTINUED] varenicline (CHANTIX BLAS) 0.5 mg (11)- 1 mg (42) tablet Take by mouth every evening. Take one 0.5mg tab by mouth once daily X3 days,then increase to one 0.5mg tab twice daily X4 days,then increase to one 1mg tab twice daily      No current facility-administered medications on file prior to visit.        Review of patient's allergies indicates:  No Known Allergies    OBJECTIVE:   Vital Signs:  Vitals:    11/29/18 1312   BP: (!) 90/50   Pulse: 94   SpO2: 96%   Weight: 76.9 kg (169 lb 8.5 oz)   Height: 5' 5" (1.651 m)   /64 when retaken manually    No results found for this or any previous visit (from the past 24 hour(s)).      Physical Exam:   General:  Well developed, well nourished, no acute distress  HEENT:  Normocephalic, atraumatic, PERRL, EOMI, clear sclera, throat clear without erythema or exudates, uvula deviated to R (states he is unsure if this is chronic since prior stroke)  CVS:  RRR, S1 and S2 normal, no murmurs, rubs, gallops  Resp:  Lungs clear to auscultation, no wheezes, rales, rhonchi  GI:  Abdomen soft, non-tender, non-distended, normoactive bowel sounds  MSK:  No muscle atrophy, peripheral edema, full range of motion  Skin:  No rashes, erythema  Neuro:  CNII-XII grossly intact  Psych:  Alert and oriented to person, place, and time    ASSESSMENT & PLAN:     Mina RICHARDSON was seen today for annual exam and stress.    Diagnoses and all orders for this visit:    Anxiety  Stated zoloft helped with his anxiety in the past, will restart  -     sertraline (ZOLOFT) 25 MG tablet; Take 1 tablet (25 mg total) by mouth once daily.    Gastroesophageal reflux " disease, esophagitis presence not specified  Retrosternal burning after eating & when lying after eating  Symptoms despite zantac  Advised patient to take prilosec  -     omeprazole (PRILOSEC) 40 MG capsule; Take 1 capsule (40 mg total) by mouth once daily.    Tobacco abuse  Smokes ~1 ppd x 20 years  Advised patient to stop smoking  Educated him on the deleterious effects including stroke, HTN, cancer  Patient is interested in quitting  -     Ambulatory referral to Smoking Cessation Program    Cerebrovascular accident (CVA) due to embolism of left middle cerebral artery  Peripheral vascular disease  -     atorvastatin (LIPITOR) 80 MG tablet; Take 1 tablet (80 mg total) by mouth every evening.  -     clopidogrel (PLAVIX) 75 mg tablet; Take 1 tablet (75 mg total) by mouth once daily.  -     Lipid panel; Future  -     Hemoglobin A1c; Future    Coronary artery disease involving native coronary artery of native heart without angina pectoris  C/w aspirin & lipitor  Patient not currently on a beta blocker as to prior notes 2/2 cocaine use  Patient currently denies illicit drug use  Will re-evaluate beta blocker next visit    Hypotension, unspecified hypotension type  BP 90/50 and 102/64 when retaken  Patient states SBP at home usually runs ~100   He denies any symptoms  Lisinopril d/c 2/2 hypotension    Healthcare maintenance  Diet and exercise counseling given  Tdap and flu vaccination ordered  He will need pneumococcal 23 vaccine next visit    Need for hepatitis C screening test  -     Hepatitis C antibody; Future

## 2018-11-30 ENCOUNTER — IMMUNIZATION (OUTPATIENT)
Dept: PHARMACY | Facility: CLINIC | Age: 58
End: 2018-11-30

## 2018-11-30 ENCOUNTER — IMMUNIZATION (OUTPATIENT)
Dept: PHARMACY | Facility: CLINIC | Age: 58
End: 2018-11-30
Payer: COMMERCIAL

## 2018-12-03 NOTE — PROGRESS NOTES
Teaching Statement  I have personally taken the history and examined this patient and agree with the resident's history, exam and assessment and plan as stated above.  Fred Carmona MD

## 2018-12-05 ENCOUNTER — LAB VISIT (OUTPATIENT)
Dept: LAB | Facility: HOSPITAL | Age: 58
End: 2018-12-05
Payer: COMMERCIAL

## 2018-12-05 DIAGNOSIS — Z11.59 NEED FOR HEPATITIS C SCREENING TEST: ICD-10-CM

## 2018-12-05 DIAGNOSIS — I73.9 PERIPHERAL VASCULAR DISEASE: ICD-10-CM

## 2018-12-05 LAB
CHOLEST SERPL-MCNC: 190 MG/DL
CHOLEST/HDLC SERPL: 4 {RATIO}
ESTIMATED AVG GLUCOSE: 100 MG/DL
HBA1C MFR BLD HPLC: 5.1 %
HCV AB SERPL QL IA: POSITIVE
HDLC SERPL-MCNC: 48 MG/DL
HDLC SERPL: 25.3 %
LDLC SERPL CALC-MCNC: 117.8 MG/DL
NONHDLC SERPL-MCNC: 142 MG/DL
TRIGL SERPL-MCNC: 121 MG/DL

## 2018-12-05 PROCEDURE — 86803 HEPATITIS C AB TEST: CPT

## 2018-12-05 PROCEDURE — 83036 HEMOGLOBIN GLYCOSYLATED A1C: CPT

## 2018-12-05 PROCEDURE — 36415 COLL VENOUS BLD VENIPUNCTURE: CPT

## 2018-12-05 PROCEDURE — 80061 LIPID PANEL: CPT

## 2018-12-10 ENCOUNTER — TELEPHONE (OUTPATIENT)
Dept: INTERNAL MEDICINE | Facility: CLINIC | Age: 58
End: 2018-12-10

## 2018-12-10 DIAGNOSIS — R76.8 HEPATITIS C ANTIBODY POSITIVE IN BLOOD: Primary | ICD-10-CM

## 2018-12-10 NOTE — TELEPHONE ENCOUNTER
Spoke to patient via phone. Informed him of lipid profile, HbA1c, as well as positive hepatitis C ab. Informed patient of need to perform f/u lab results to rule out false positive vs infection which has already been cleared vs chronic ongoing infection. Amb referral to hepatology placed as well. Patient informed and on board with plan.

## 2018-12-13 ENCOUNTER — TELEPHONE (OUTPATIENT)
Dept: SMOKING CESSATION | Facility: CLINIC | Age: 58
End: 2018-12-13

## 2018-12-14 ENCOUNTER — DOCUMENTATION ONLY (OUTPATIENT)
Dept: TRANSPLANT | Facility: CLINIC | Age: 58
End: 2018-12-14

## 2018-12-14 NOTE — NURSING
Pt records reviewed.   Pt will be referred to Hepatitis C clinic    Initial referral received  from the [facility]que.   Referring Provider/diagnosis  ROXANE HERNANDEZ Provider: Roxane Hernandez MD   Diagnosis: Hepatitis C antibody positive in blood          Referral letter sent to  patient.

## 2018-12-14 NOTE — LETTER
December 14, 2018    Mina Jo  [address]  [address]  [facility] [address]      Dear Mina Jo:    Your doctor has referred you to the [facility]. You will be contacted by our office and an initial appointment will then be scheduled for you.    We look forward to seeing you soon. If you have any further questions, please contact us at [phone].       Sincerely,        [facility]   [facility] [place], [place] [id number] [phone]

## 2018-12-24 ENCOUNTER — INITIAL CONSULT (OUTPATIENT)
Dept: HEPATOLOGY | Facility: CLINIC | Age: 58
End: 2018-12-24
Payer: COMMERCIAL

## 2018-12-24 ENCOUNTER — LAB VISIT (OUTPATIENT)
Dept: LAB | Facility: HOSPITAL | Age: 58
End: 2018-12-24
Payer: COMMERCIAL

## 2018-12-24 VITALS
WEIGHT: 163.13 LBS | HEART RATE: 95 BPM | BODY MASS INDEX: 22.09 KG/M2 | TEMPERATURE: 98 F | DIASTOLIC BLOOD PRESSURE: 86 MMHG | HEIGHT: 72 IN | OXYGEN SATURATION: 97 % | SYSTOLIC BLOOD PRESSURE: 150 MMHG

## 2018-12-24 DIAGNOSIS — R76.8 HCV ANTIBODY POSITIVE: Primary | ICD-10-CM

## 2018-12-24 DIAGNOSIS — R76.8 HCV ANTIBODY POSITIVE: ICD-10-CM

## 2018-12-24 LAB
ALBUMIN SERPL BCP-MCNC: 4.3 G/DL
ALP SERPL-CCNC: 99 U/L
ALT SERPL W/O P-5'-P-CCNC: 32 U/L
ANION GAP SERPL CALC-SCNC: 11 MMOL/L
AST SERPL-CCNC: 30 U/L
BASOPHILS # BLD AUTO: 0.05 K/UL
BASOPHILS NFR BLD: 0.6 %
BILIRUB SERPL-MCNC: 0.6 MG/DL
BUN SERPL-MCNC: 11 MG/DL
CALCIUM SERPL-MCNC: 9.4 MG/DL
CHLORIDE SERPL-SCNC: 103 MMOL/L
CO2 SERPL-SCNC: 25 MMOL/L
CREAT SERPL-MCNC: 0.9 MG/DL
DIFFERENTIAL METHOD: ABNORMAL
EOSINOPHIL # BLD AUTO: 0.1 K/UL
EOSINOPHIL NFR BLD: 1.8 %
ERYTHROCYTE [DISTWIDTH] IN BLOOD BY AUTOMATED COUNT: 12.6 %
EST. GFR  (AFRICAN AMERICAN): >60 ML/MIN/1.73 M^2
EST. GFR  (NON AFRICAN AMERICAN): >60 ML/MIN/1.73 M^2
GLUCOSE SERPL-MCNC: 90 MG/DL
HBV CORE AB SERPL QL IA: NEGATIVE
HBV SURFACE AB SER-ACNC: NEGATIVE M[IU]/ML
HCT VFR BLD AUTO: 49.5 %
HEPATITIS A ANTIBODY, IGG: NEGATIVE
HGB BLD-MCNC: 16.8 G/DL
HIV 1+2 AB+HIV1 P24 AG SERPL QL IA: NEGATIVE
IMM GRANULOCYTES # BLD AUTO: 0.02 K/UL
IMM GRANULOCYTES NFR BLD AUTO: 0.3 %
INR PPP: 0.9
LYMPHOCYTES # BLD AUTO: 2.4 K/UL
LYMPHOCYTES NFR BLD: 30.5 %
MCH RBC QN AUTO: 32.2 PG
MCHC RBC AUTO-ENTMCNC: 33.9 G/DL
MCV RBC AUTO: 95 FL
MONOCYTES # BLD AUTO: 0.6 K/UL
MONOCYTES NFR BLD: 8.2 %
NEUTROPHILS # BLD AUTO: 4.6 K/UL
NEUTROPHILS NFR BLD: 58.6 %
NRBC BLD-RTO: 0 /100 WBC
PLATELET # BLD AUTO: 264 K/UL
PMV BLD AUTO: 9.7 FL
POTASSIUM SERPL-SCNC: 4.9 MMOL/L
PROT SERPL-MCNC: 8.6 G/DL
PROTHROMBIN TIME: 10 SEC
RBC # BLD AUTO: 5.21 M/UL
SODIUM SERPL-SCNC: 139 MMOL/L
WBC # BLD AUTO: 7.77 K/UL

## 2018-12-24 PROCEDURE — 86704 HEP B CORE ANTIBODY TOTAL: CPT

## 2018-12-24 PROCEDURE — 86790 VIRUS ANTIBODY NOS: CPT

## 2018-12-24 PROCEDURE — 3079F DIAST BP 80-89 MM HG: CPT | Mod: CPTII,S$GLB,, | Performed by: PHYSICIAN ASSISTANT

## 2018-12-24 PROCEDURE — 85610 PROTHROMBIN TIME: CPT

## 2018-12-24 PROCEDURE — 80053 COMPREHEN METABOLIC PANEL: CPT

## 2018-12-24 PROCEDURE — 87522 HEPATITIS C REVRS TRNSCRPJ: CPT

## 2018-12-24 PROCEDURE — 99204 OFFICE O/P NEW MOD 45 MIN: CPT | Mod: S$GLB,,, | Performed by: PHYSICIAN ASSISTANT

## 2018-12-24 PROCEDURE — 36415 COLL VENOUS BLD VENIPUNCTURE: CPT

## 2018-12-24 PROCEDURE — 86703 HIV-1/HIV-2 1 RESULT ANTBDY: CPT

## 2018-12-24 PROCEDURE — 3077F SYST BP >= 140 MM HG: CPT | Mod: CPTII,S$GLB,, | Performed by: PHYSICIAN ASSISTANT

## 2018-12-24 PROCEDURE — 3008F BODY MASS INDEX DOCD: CPT | Mod: CPTII,S$GLB,, | Performed by: PHYSICIAN ASSISTANT

## 2018-12-24 PROCEDURE — 85025 COMPLETE CBC W/AUTO DIFF WBC: CPT

## 2018-12-24 PROCEDURE — 99999 PR PBB SHADOW E&M-EST. PATIENT-LVL III: CPT | Mod: PBBFAC,,, | Performed by: PHYSICIAN ASSISTANT

## 2018-12-24 PROCEDURE — 86706 HEP B SURFACE ANTIBODY: CPT

## 2018-12-24 NOTE — PROGRESS NOTES
HEPATOLOGY CLINIC VISIT NOTE - HCV clinic    REFERRING PROVIDER:Dr. Med Bowie  OUTSIDE REFERRING PROVIDER:    CHIEF COMPLAINT: HCV AB (+)     HISTORY: This is a 58 y.o. White male with a positive HCV AB here for initial evaluation. He was screened by birth cohort. Risk factors for HCV include: h/o drug use-APRIL many years ago, and unregulated tattoos.     Review of most recent labs shows normal liver enzymes, normal LFTs. PLTs are well preserved. He has not had recent abdominal imaging or formal fibrosis staging.     He denies FH of liver disease. He reports daily alcohol use, 6 pack per day 10-15 years     HCV history:  HCV AB (+)    Risk Factors:  Tattoos- (+) unregulated  IV/APRIL- (+)    Liver staging:  No formal staging  Results for JESSE WRIGHT (MRN [id number]) as of 12/24/2018 10:15   Ref. Range 12/24/2018 08:36   Albumin Latest Ref Range: 3.5 - 5.2 g/dL 4.3   Total Bilirubin Latest Ref Range: 0.1 - 1.0 mg/dL 0.6   AST Latest Ref Range: 10 - 40 U/L 30   ALT Latest Ref Range: 10 - 44 U/L 32     Denies jaundice, dark urine, abdominal distention, hematemesis, melena, slowed mentation.   No abnormal skin rashes. No generalized joint pain.                     Past Medical History:   Diagnosis Date    Cerebral infarction due to embolism of left middle cerebral artery 9/25/2017    Coronary artery disease     Stroke     Stroke due to stenosis of left carotid artery 1/9/2018    Stroke due to stenosis of left carotid artery 1/9/2018     Past Surgical History:   Procedure Laterality Date    BYPASS GRAFT      triple bypass 2009    CARDIAC SURGERY      triple bypass x 2    Endarterectomy-Carotid Left 11/17/2017    [facility]    ROTATOR CUFF REPAIR Right      FAMILY HISTORY: Negative for liver disease    SOCIAL HISTORY:   Social History     Tobacco Use   Smoking Status Current Every Day Smoker    Packs/day: 1.00    Years: 20.00    Pack years: 20.00    Types:  Cigarettes   Smokeless Tobacco Never Used       Social History     Substance and Sexual Activity   Alcohol Use Yes    Comment: beer daily       Social History     Substance and Sexual Activity   Drug Use No       ROS:   No fever, chills,  fatigue  No chest pain, dyspnea, cough  No abdominal pain,nausea, vomiting  No headaches, visual changes  No lower extremity edema  No depression or anxiety      PHYSICAL EXAM:  Friendly White male, in no acute distress; alert and oriented to person, place and time  VITALS: reviewed  HEENT: Sclerae anicteric. (+) whitish plaque to right hard palate- instructed to f/u with dentist   NECK: Supple  CVS: Regular rate and rhythm. No murmurs  LUNGS: Normal respiratory effort. Clear bilaterally  ABDOMEN: Flat, soft, nontender. No organomegaly or masses. No ascites or hernias  SKIN: Warm and dry. No jaundice, No obvious rashes.   EXTREMITIES: No lower extremity edema  NEURO/PSYCH: Normal gate. Memory intact. Thought and speech pattern appropriate. Behavior normal. No depression or anxiety noted.    RECENT LABS:  Lab Results   Component Value Date    WBC 8.60 09/18/2018    HGB 15.3 09/18/2018     09/18/2018     Lab Results   Component Value Date    INR 1.0 11/10/2017     Lab Results   Component Value Date    AST 20 09/18/2018    ALT 24 09/18/2018    BILITOT 0.4 09/18/2018    ALBUMIN 3.8 09/18/2018    ALKPHOS 90 09/18/2018    CREATININE 0.9 09/18/2018    BUN 14 09/18/2018     09/18/2018    K 3.5 09/18/2018     RECENT IMAGING:  n/a    ASSESSMENT  58 y.o. White male with:  1. HCV AB (+)  -- HCV genotype  -- MELD labs  -- HAV, HBV and HIV studies  -- normal liver enzymes     2. DAILY ALCOHOL USE  -- discussed drinking is significant  -- normal liver enzymes    EDUCATION:   The natural history of Hepatitis C, including potential progression to cirrhosis was reviewed. Complications of cirrhosis, including hepatic decompensation, liver cancer, liver failure, need for liver transplant,  and death were reviewed.     We discussed the increased progression of liver disease secondary to alcohol use; patient was advised to avoid alcohol completely.     Transmission of Hepatitis C was reviewed, including possible sexual transmission. Sexual contacts should be screened.     Risk of vertical transmission of Hepatitis C from mother to baby was reviewed. Children should be screened.     Patient should avoid sharing personal products such as razors, toothbrushes, etc.     We reviewed that vaccination against Hepatitis A and Hepatitis B is recommended if patient does not already have immunity.    Recommend avoiding raw seafood.    Limit acetaminophen to 2000mg daily.    PLAN:  1. Labs today  2. F/u TBD     Mac Torrez PA-C

## 2018-12-24 NOTE — LETTER
December 24, 2018      Med Bowie MD  [address]  [facility] [address]           [facility] Arcos - Hepatitis C  [address]  [facility] [address]  Phone: [phone]  Fax: [phone]          Patient: Mina Jo   MR Number: [id number]   YOB: 1960   Date of Visit: 12/24/2018       Dear Dr. Med Bowie:    Thank you for referring Mina Jo to me for evaluation. Attached you will find relevant portions of my assessment and plan of care.    If you have questions, please do not hesitate to call me. I look forward to following Mina Jo along with you.    Sincerely,    Mac Torrez PA-C    Enclosure  CC:  No Recipients    If you would like to receive this communication electronically, please contact [email or web address] or [phone] to request more information on [employer] access.    For providers and/or their staff who would like to refer a patient to [facility], please contact us through our one-stop-shop provider referral line, [facility], at [phone].    If you feel you have received this communication in error or would no longer like to receive these types of communications, please e-mail [email or web address]

## 2018-12-26 LAB
HCV GENTYP SERPL NAA+PROBE: NORMAL
HCV RNA SERPL NAA+PROBE-LOG IU: <1.08 LOG (10) IU/ML
HCV RNA SERPL QL NAA+PROBE: NOT DETECTED
HCV RNA SPEC NAA+PROBE-ACNC: <12 IU/ML

## 2018-12-27 DIAGNOSIS — R76.8 HCV ANTIBODY POSITIVE: Primary | ICD-10-CM

## 2018-12-28 ENCOUNTER — TELEPHONE (OUTPATIENT)
Dept: HEPATOLOGY | Facility: CLINIC | Age: 58
End: 2018-12-28

## 2018-12-28 NOTE — TELEPHONE ENCOUNTER
----- Message from Mac Torrez PA-C sent at 12/27/2018  9:28 AM CST -----  Please let him know that HCV test was negative. The AB was likely a false positive or a previously resolved infection. Please have him repeat HCV RNA in 12 weeks to confirm

## 2019-01-22 ENCOUNTER — OFFICE VISIT (OUTPATIENT)
Dept: CARDIOLOGY | Facility: CLINIC | Age: 59
End: 2019-01-22
Payer: COMMERCIAL

## 2019-01-22 VITALS
WEIGHT: 170 LBS | DIASTOLIC BLOOD PRESSURE: 59 MMHG | SYSTOLIC BLOOD PRESSURE: 125 MMHG | HEART RATE: 93 BPM | BODY MASS INDEX: 23.03 KG/M2 | HEIGHT: 72 IN

## 2019-01-22 DIAGNOSIS — Z95.1 HX OF CABG: ICD-10-CM

## 2019-01-22 DIAGNOSIS — I73.9 PAD (PERIPHERAL ARTERY DISEASE): ICD-10-CM

## 2019-01-22 DIAGNOSIS — E78.2 COMBINED HYPERLIPIDEMIA: ICD-10-CM

## 2019-01-22 DIAGNOSIS — I10 ESSENTIAL HYPERTENSION: ICD-10-CM

## 2019-01-22 DIAGNOSIS — Z98.890 HISTORY OF CAROTID ENDARTERECTOMY: ICD-10-CM

## 2019-01-22 DIAGNOSIS — I65.23 BILATERAL CAROTID ARTERY STENOSIS: ICD-10-CM

## 2019-01-22 DIAGNOSIS — F17.200 TOBACCO DEPENDENCE SYNDROME: ICD-10-CM

## 2019-01-22 DIAGNOSIS — I25.10 CORONARY ARTERY DISEASE INVOLVING NATIVE CORONARY ARTERY OF NATIVE HEART WITHOUT ANGINA PECTORIS: Primary | ICD-10-CM

## 2019-01-22 PROBLEM — R07.9 CHEST PAIN: Status: RESOLVED | Noted: 2017-06-01 | Resolved: 2019-01-22

## 2019-01-22 PROCEDURE — 99999 PR PBB SHADOW E&M-EST. PATIENT-LVL III: ICD-10-PCS | Mod: PBBFAC,,, | Performed by: INTERNAL MEDICINE

## 2019-01-22 PROCEDURE — 3074F SYST BP LT 130 MM HG: CPT | Mod: CPTII,S$GLB,, | Performed by: INTERNAL MEDICINE

## 2019-01-22 PROCEDURE — 3074F PR MOST RECENT SYSTOLIC BLOOD PRESSURE < 130 MM HG: ICD-10-PCS | Mod: CPTII,S$GLB,, | Performed by: INTERNAL MEDICINE

## 2019-01-22 PROCEDURE — 3078F DIAST BP <80 MM HG: CPT | Mod: CPTII,S$GLB,, | Performed by: INTERNAL MEDICINE

## 2019-01-22 PROCEDURE — 99214 OFFICE O/P EST MOD 30 MIN: CPT | Mod: 25,S$GLB,, | Performed by: INTERNAL MEDICINE

## 2019-01-22 PROCEDURE — 3008F PR BODY MASS INDEX (BMI) DOCUMENTED: ICD-10-PCS | Mod: CPTII,S$GLB,, | Performed by: INTERNAL MEDICINE

## 2019-01-22 PROCEDURE — 99214 PR OFFICE/OUTPT VISIT, EST, LEVL IV, 30-39 MIN: ICD-10-PCS | Mod: 25,S$GLB,, | Performed by: INTERNAL MEDICINE

## 2019-01-22 PROCEDURE — 99406 BEHAV CHNG SMOKING 3-10 MIN: CPT | Mod: S$GLB,,, | Performed by: INTERNAL MEDICINE

## 2019-01-22 PROCEDURE — 3078F PR MOST RECENT DIASTOLIC BLOOD PRESSURE < 80 MM HG: ICD-10-PCS | Mod: CPTII,S$GLB,, | Performed by: INTERNAL MEDICINE

## 2019-01-22 PROCEDURE — 99999 PR PBB SHADOW E&M-EST. PATIENT-LVL III: CPT | Mod: PBBFAC,,, | Performed by: INTERNAL MEDICINE

## 2019-01-22 PROCEDURE — 99406 PR TOBACCO USE CESSATION INTERMEDIATE 3-10 MINUTES: ICD-10-PCS | Mod: S$GLB,,, | Performed by: INTERNAL MEDICINE

## 2019-01-22 PROCEDURE — 3008F BODY MASS INDEX DOCD: CPT | Mod: CPTII,S$GLB,, | Performed by: INTERNAL MEDICINE

## 2019-01-22 RX ORDER — EZETIMIBE 10 MG/1
10 TABLET ORAL DAILY
Qty: 90 TABLET | Refills: 3 | Status: ON HOLD | OUTPATIENT
Start: 2019-01-22 | End: 2019-07-08

## 2019-01-22 NOTE — PROGRESS NOTES
Subjective:   Patient ID:  Mina Jo is a 58 y.o. 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 At the time of his CVA, he received tPA and has no residual deficits.  He was placed on ASA and plavix after his evaluation.  He was cocaine+ at the time of presentation.  He is followed by Vascular surgery - Dr. Patel and has 60 - 79% right ICA stenosis.  He continues to smoke and is interested in tob cessation classes.  He states that he was enrolled and has had trouble making contact with the instructors. He denies any exertional chest discomfort, exertional dyspnea, palpitations, TIA's, syncope or presyncope    ECHO 9-2017  CONCLUSIONS     1 - Concentric remodeling.     2 - No wall motion abnormalities.     3 - Normal left ventricular systolic function (EF 60-65%).     4 - Indeterminate LV diastolic function.     5 - Normal right ventricular systolic function .     Stress echo 6-2017  CONCLUSIONS     1 - No wall motion abnormalities.     2 - Normal left ventricular systolic function (EF 55-60%).     3 - Normal left ventricular diastolic function.     4 - Negative stress echocardiogram for ischemia at a high workload .         Vitals:    01/22/19 1109   BP: (!) 125/59   BP Location: Left arm   Patient Position: Sitting   BP Method: Large (Automatic)   Pulse: 93   Weight: 77.1 kg (169 lb 15.6 oz)   Height: 6' (1.829 m)     Body mass index is 23.05 kg/m².  CrCl cannot be calculated (Patient's most recent lab result is older than the maximum 7 days allowed.).    Lab Results   Component Value Date     12/24/2018    K 4.9 12/24/2018     12/24/2018    CO2 25 12/24/2018    BUN 11 12/24/2018    CREATININE 0.9 12/24/2018    GLU 90 12/24/2018    HGBA1C 5.1 12/05/2018    MG 2.1 09/26/2017    AST 30 12/24/2018    ALT 32 12/24/2018    ALBUMIN  4.3 12/24/2018    PROT 8.6 (H) 12/24/2018    BILITOT 0.6 12/24/2018    WBC 7.77 12/24/2018    HGB 16.8 12/24/2018    HCT 49.5 12/24/2018    HCT 44 09/24/2017    MCV 95 12/24/2018     12/24/2018    INR 0.9 12/24/2018    TSH 2.389 09/25/2017    CHOL 190 12/05/2018    HDL 48 12/05/2018    LDLCALC 117.8 12/05/2018    TRIG 121 12/05/2018       Current Outpatient Medications   Medication Sig    aspirin 81 MG Chew Take 81 mg by mouth once daily.    atorvastatin (LIPITOR) 80 MG tablet Take 1 tablet (80 mg total) by mouth every evening.    clopidogrel (PLAVIX) 75 mg tablet Take 1 tablet (75 mg total) by mouth once daily.    omeprazole (PRILOSEC) 40 MG capsule Take 1 capsule (40 mg total) by mouth once daily.    sertraline (ZOLOFT) 25 MG tablet Take 1 tablet (25 mg total) by mouth once daily.    ezetimibe (ZETIA) 10 mg tablet Take 1 tablet (10 mg total) by mouth once daily.    nitroGLYCERIN (NITROSTAT) 0.4 MG SL tablet Place 0.4 mg under the tongue.     No current facility-administered medications for this visit.        Review of Systems   Constitution: Negative for decreased appetite, weakness, malaise/fatigue, weight gain and weight loss.   Eyes: Negative for visual disturbance.   Cardiovascular: Negative for chest pain, claudication, dyspnea on exertion, irregular heartbeat, orthopnea, palpitations, paroxysmal nocturnal dyspnea and syncope.   Respiratory: Negative for cough, shortness of breath and snoring.    Skin: Negative for rash.   Musculoskeletal: Negative for arthritis, muscle cramps, muscle weakness and myalgias.   Gastrointestinal: Negative for abdominal pain, anorexia, change in bowel habit and nausea.   Genitourinary: Negative for dysuria and frequency.   Neurological: Negative for excessive daytime sleepiness, dizziness, headaches, loss of balance and numbness.   Psychiatric/Behavioral: Negative for depression.       Objective:   Physical Exam   Constitutional: He is oriented to person, place, and  time. He appears well-developed and well-nourished.   HENT:   Head: Normocephalic and atraumatic.   Eyes: Pupils are equal, round, and reactive to light.   Neck: Normal range of motion. Neck supple.   Cardiovascular: Normal rate, regular rhythm, normal heart sounds, intact distal pulses and normal pulses. Exam reveals no gallop.   No murmur heard.  Pulmonary/Chest: Effort normal and breath sounds normal.   Abdominal: Soft. Bowel sounds are normal. There is no hepatosplenomegaly. There is no tenderness.   Musculoskeletal: Normal range of motion.   Neurological: He is alert and oriented to person, place, and time.   Skin: Skin is warm and dry.   Psychiatric: He has a normal mood and affect. His speech is normal and behavior is normal. Judgment and thought content normal.       Assessment:     1. Coronary artery disease involving native coronary artery of native heart without angina pectoris    2. Hx of CABG    3. PAD (peripheral artery disease)    4. History of carotid endarterectomy    5. Essential hypertension    6. Combined hyperlipidemia    7. Bilateral carotid artery stenosis        Plan:   Pt is clinically stable and without angina.  Pt is compliant with max dose statin but LDL well above goal.  Add zetia 10 and check labs in 6 wks.  Will have staff assist with getting patient into smoking cessation classes.       Orders Placed This Encounter   Procedures    Lipid panel    Comprehensive metabolic panel         Tobacco Use/Cessation:  I assessed Mina Jo and discussed smoking cessation with him for 3-10 minutes. He  Social History     Tobacco Use   Smoking Status Current Every Day Smoker    Packs/day: 1.00    Years: 20.00    Pack years: 20.00    Types: Cigarettes   Smokeless Tobacco Never Used

## 2019-01-22 NOTE — Clinical Note
He is supposed to be in smoking cessation classes but said that he is having trouble getting in.  Can you assist in getting him the correct contact information?

## 2019-02-11 ENCOUNTER — DOCUMENTATION ONLY (OUTPATIENT)
Dept: CARDIOLOGY | Facility: CLINIC | Age: 59
End: 2019-02-11

## 2019-02-11 NOTE — PROGRESS NOTES
""cleareance" for dental surgery requested.  Pt has no active cardiac condition (ACS/USA, decompenstated CHF, significant arrhythmias or severe valvular disease) and can easily achieve 4 METS.  Pt does not require further cardiac evaluation prior to undergoing dental surgery.  Aspirin and/or plavix  therapy can be held but should be restarted as soon as safely possible.  The remaining cardiac meds can beheld as needed but should also be restarted after the procedure. These recommendations follow the most current Guideline on Perioperative Cardiovascular Evaluation and Management of Patients Undergoing Noncardiac Surgery released by the ACC/AHA. (JACC 2014.07.944).    "

## 2019-03-22 ENCOUNTER — LAB VISIT (OUTPATIENT)
Dept: LAB | Facility: HOSPITAL | Age: 59
End: 2019-03-22
Payer: COMMERCIAL

## 2019-03-22 DIAGNOSIS — E78.2 COMBINED HYPERLIPIDEMIA: ICD-10-CM

## 2019-03-22 DIAGNOSIS — R76.8 HCV ANTIBODY POSITIVE: ICD-10-CM

## 2019-03-22 DIAGNOSIS — I25.10 CORONARY ARTERY DISEASE INVOLVING NATIVE CORONARY ARTERY OF NATIVE HEART WITHOUT ANGINA PECTORIS: ICD-10-CM

## 2019-03-22 LAB
ALBUMIN SERPL BCP-MCNC: 3.8 G/DL
ALP SERPL-CCNC: 100 U/L
ALT SERPL W/O P-5'-P-CCNC: 27 U/L
ANION GAP SERPL CALC-SCNC: 13 MMOL/L
AST SERPL-CCNC: 24 U/L
BILIRUB SERPL-MCNC: 0.5 MG/DL
BUN SERPL-MCNC: 14 MG/DL
CALCIUM SERPL-MCNC: 9.4 MG/DL
CHLORIDE SERPL-SCNC: 106 MMOL/L
CHOLEST SERPL-MCNC: 197 MG/DL
CHOLEST/HDLC SERPL: 4.2 {RATIO}
CO2 SERPL-SCNC: 22 MMOL/L
CREAT SERPL-MCNC: 0.8 MG/DL
EST. GFR  (AFRICAN AMERICAN): >60 ML/MIN/1.73 M^2
EST. GFR  (NON AFRICAN AMERICAN): >60 ML/MIN/1.73 M^2
GLUCOSE SERPL-MCNC: 164 MG/DL
HDLC SERPL-MCNC: 47 MG/DL
HDLC SERPL: 23.9 %
LDLC SERPL CALC-MCNC: 105 MG/DL
NONHDLC SERPL-MCNC: 150 MG/DL
POTASSIUM SERPL-SCNC: 4.1 MMOL/L
PROT SERPL-MCNC: 7.4 G/DL
SODIUM SERPL-SCNC: 141 MMOL/L
TRIGL SERPL-MCNC: 225 MG/DL

## 2019-03-22 PROCEDURE — 80053 COMPREHEN METABOLIC PANEL: CPT

## 2019-03-22 PROCEDURE — 87522 HEPATITIS C REVRS TRNSCRPJ: CPT

## 2019-03-22 PROCEDURE — 36415 COLL VENOUS BLD VENIPUNCTURE: CPT

## 2019-03-22 PROCEDURE — 80061 LIPID PANEL: CPT

## 2019-03-25 LAB
HCV RNA SERPL NAA+PROBE-LOG IU: <1.08 LOG (10) IU/ML
HCV RNA SERPL QL NAA+PROBE: NOT DETECTED IU/ML
HCV RNA SPEC NAA+PROBE-ACNC: <12 IU/ML

## 2019-03-26 ENCOUNTER — TELEPHONE (OUTPATIENT)
Dept: HEPATOLOGY | Facility: CLINIC | Age: 59
End: 2019-03-26

## 2019-03-26 NOTE — TELEPHONE ENCOUNTER
----- Message from Mac Torrez PA-C sent at 3/26/2019  1:18 PM CDT -----  HCV negative x 2, doesn't have HCV, doesn't need f/u

## 2019-04-02 ENCOUNTER — HOSPITAL ENCOUNTER (EMERGENCY)
Facility: HOSPITAL | Age: 59
Discharge: HOME | End: 2019-04-02
Attending: EMERGENCY MEDICINE
Payer: COMMERCIAL

## 2019-04-02 VITALS
BODY MASS INDEX: 22.89 KG/M2 | DIASTOLIC BLOOD PRESSURE: 75 MMHG | HEART RATE: 85 BPM | WEIGHT: 169 LBS | HEIGHT: 72 IN | OXYGEN SATURATION: 99 % | RESPIRATION RATE: 15 BRPM | SYSTOLIC BLOOD PRESSURE: 154 MMHG | TEMPERATURE: 99 F

## 2019-04-02 DIAGNOSIS — Z87.81 HX OF FRACTURE OF RIB: ICD-10-CM

## 2019-04-02 DIAGNOSIS — S22.49XA RIB FRACTURES: ICD-10-CM

## 2019-04-02 PROCEDURE — 93010 EKG 12-LEAD: ICD-10-PCS | Mod: ,,, | Performed by: INTERNAL MEDICINE

## 2019-04-02 PROCEDURE — 25000003 PHARM REV CODE 250: Performed by: PHYSICIAN ASSISTANT

## 2019-04-02 PROCEDURE — 99284 EMERGENCY DEPT VISIT MOD MDM: CPT

## 2019-04-02 PROCEDURE — 93005 ELECTROCARDIOGRAM TRACING: CPT

## 2019-04-02 PROCEDURE — 99284 PR EMERGENCY DEPT VISIT,LEVEL IV: ICD-10-PCS | Mod: ,,, | Performed by: PHYSICIAN ASSISTANT

## 2019-04-02 PROCEDURE — 99284 EMERGENCY DEPT VISIT MOD MDM: CPT | Mod: ,,, | Performed by: PHYSICIAN ASSISTANT

## 2019-04-02 PROCEDURE — 93010 ELECTROCARDIOGRAM REPORT: CPT | Mod: ,,, | Performed by: INTERNAL MEDICINE

## 2019-04-02 RX ORDER — LIDOCAINE 50 MG/G
1 PATCH TOPICAL DAILY
Qty: 15 PATCH | Refills: 0 | Status: ON HOLD | OUTPATIENT
Start: 2019-04-02 | End: 2019-07-08

## 2019-04-02 RX ORDER — OXYCODONE AND ACETAMINOPHEN 5; 325 MG/1; MG/1
1 TABLET ORAL EVERY 4 HOURS PRN
Status: ON HOLD | COMMUNITY
End: 2019-07-09 | Stop reason: HOSPADM

## 2019-04-02 RX ORDER — LIDOCAINE 50 MG/G
1 PATCH TOPICAL ONCE
Status: DISCONTINUED | OUTPATIENT
Start: 2019-04-02 | End: 2019-04-02 | Stop reason: HOSPADM

## 2019-04-02 RX ADMIN — LIDOCAINE 1 PATCH: 50 PATCH TOPICAL at 06:04

## 2019-04-02 NOTE — ED PROVIDER NOTES
Encounter Date: 4/2/2019    SCRIBE #1 NOTE: I, Wayne Patel, am scribing for, and in the presence of,  Jannet Babb M.D. I have scribed the following portions of the note - the APC attestation.       History     Chief Complaint   Patient presents with    Rib Injury     seen at  yest told 2 broken ribs, pain has increased     5:39 PM    Patient is a 59-year-old male with a history of CAD, stroke, anxiety who presents the ED with left rib pain. Patient fell off the back of a tractor and landed on his left side.  He went to an urgent care yesterday and was told that he broke 2 ribs.  Patient presents the ED due to increase in pain.  He denies any recent injury or trauma.  He did not his head or have any LOC when he fell.  He is currently complaining of 8/10 pain mainly located to his left lower lateral ribs.  He denies any cough, hemoptysis, nausea, vomiting, abdominal pain.  He has not had difficulty tolerating fluids or food.  He last had a pain medication 2 hours PTA.        Review of patient's allergies indicates:  No Known Allergies  Past Medical History:   Diagnosis Date    Anxiety     Cerebral infarction due to embolism of left middle cerebral artery 9/25/2017    Coronary artery disease     Stroke     Stroke due to stenosis of left carotid artery 1/9/2018    Stroke due to stenosis of left carotid artery 1/9/2018     Past Surgical History:   Procedure Laterality Date    BYPASS GRAFT      triple bypass 2009    CARDIAC SURGERY      triple bypass x 2    Endarterectomy-Carotid Left 11/17/2017    Performed by Sanjana Patel [facility] at [facility] OR [facility]    ROTATOR CUFF REPAIR Right      Family History   Problem Relation Age of Onset    Stroke Mother     Heart disease Mother     Diabetes Mother     Heart disease Father     Heart disease Brother 56    Cancer Neg Hx     Kidney disease Neg Hx     Hypertension Neg Hx      Social History     Tobacco Use    Smoking status: Current Every Day Smoker      Packs/day: 1.00     Years: 20.00     Pack years: 20.00     Types: Cigarettes    Smokeless tobacco: Never Used   Substance Use Topics    Alcohol use: Yes     Comment: every 3 days, none today    Drug use: No     Review of Systems   Constitutional: Negative for chills and fever.   HENT: Negative for facial swelling and sore throat.    Eyes: Negative for photophobia.   Respiratory: Negative for cough and shortness of breath.         No hemoptysis.   Cardiovascular: Negative for palpitations.   Gastrointestinal: Negative for nausea.   Endocrine: Negative for polyuria.   Genitourinary: Negative for dysuria and hematuria.   Musculoskeletal: Positive for arthralgias. Negative for back pain.   Skin: Negative for rash.   Neurological: Positive for dizziness (after fall, resolved). Negative for syncope, weakness and headaches.   Hematological: Does not bruise/bleed easily.       Physical Exam     Initial Vitals [04/02/19 1642]   BP Pulse Resp Temp SpO2   (!) 152/83 100 18 98.9 °F (37.2 °C) 97 %      MAP       --         Physical Exam    Vitals reviewed.  Constitutional: He appears well-developed and well-nourished. He is not diaphoretic. No distress.   HENT:   Head: Normocephalic and atraumatic.   Nose: Nose normal.   Eyes: Conjunctivae and EOM are normal.   Neck: Normal range of motion.   Cardiovascular: Normal rate, regular rhythm and normal heart sounds. Exam reveals no friction rub.    No murmur heard.  Pulmonary/Chest: Breath sounds normal. No respiratory distress. He has no wheezes. He has no rales. He exhibits bony tenderness.       Abdominal: Soft. Bowel sounds are normal. He exhibits no distension. There is no tenderness.   Musculoskeletal: Normal range of motion.   Neurological: He is alert and oriented to person, place, and time. He has normal strength. No sensory deficit.   Skin: Skin is warm and dry. No erythema.   Psychiatric: He has a normal mood and affect. Thought content normal.         ED Course    Procedures  Labs Reviewed - No data to display     ECG Results          EKG 12-lead (In process)  Result time 04/03/19 07:55:19    [facility] (04/03/19 07:55:19)                 Narrative:    Test Reason : S22.39XA,    Vent. Rate : 099 BPM     Atrial Rate : 099 BPM     P-R Int : 170 ms          QRS Dur : 090 ms      QT Int : 366 ms       P-R-T Axes : 051 080 051 degrees     QTc Int : 469 ms    Normal sinus rhythm  Normal ECG  When compared with ECG of 18-SEP-2018 16:15,  No significant change was found    Referred By: [email or web address]   SELF           Confirmed By:                             Imaging Results          X-Ray Chest PA And Lateral (Final result)  Result time 04/02/19 18:11:37    Final result by Michael Banks MD (04/02/19 18:11:37)                 Impression:      1. Acute fracture of left inferolateral rib 10.  2. Obscuration of the bilateral costophrenic angles, may reflect small effusions versus atelectasis.  3. Increased interstitial attenuation bilaterally, likely accentuated by shallow inspiratory effort, early edema is a consideration.      Electronically signed by: Michael Banks MD  Date:    04/02/2019  Time:    18:11             Narrative:    EXAMINATION:  XR CHEST PA AND LATERAL; XR RIBS 2 VIEW LEFT    CLINICAL HISTORY:  Personal history of (healed) traumatic fracture    TECHNIQUE:  PA and lateral views of the chest were performed.  Three views left ribs    COMPARISON:  09/18/2018    FINDINGS:  The cardiomediastinal silhouette not enlarged, noting calcification of the aorta..  There is obscuration of the bilateral costophrenic angles, right greater than left, may reflect small effusions or atelectasis..  The trachea is midline.  The lungs are symmetrically expanded bilaterally mildly coarse interstitial attenuation and bilateral basilar subsegmental atelectasis.  There may be a calcified granuloma projected over the right lower lung zone..  No large focal  consolidation seen.  There is no pneumothorax.  The osseous structures are remarkable for degenerative changes.  Sternotomy noted.  There is acute appearing fracture of left inferolateral rib 10..                               X-Ray Ribs 2 View Left (Final result)  Result time 04/02/19 18:11:37    Final result by Michael Banks MD (04/02/19 18:11:37)                 Impression:      1. Acute fracture of left inferolateral rib 10.  2. Obscuration of the bilateral costophrenic angles, may reflect small effusions versus atelectasis.  3. Increased interstitial attenuation bilaterally, likely accentuated by shallow inspiratory effort, early edema is a consideration.      Electronically signed by: Michael Banks MD  Date:    04/02/2019  Time:    18:11             Narrative:    EXAMINATION:  XR CHEST PA AND LATERAL; XR RIBS 2 VIEW LEFT    CLINICAL HISTORY:  Personal history of (healed) traumatic fracture    TECHNIQUE:  PA and lateral views of the chest were performed.  Three views left ribs    COMPARISON:  09/18/2018    FINDINGS:  The cardiomediastinal silhouette not enlarged, noting calcification of the aorta..  There is obscuration of the bilateral costophrenic angles, right greater than left, may reflect small effusions or atelectasis..  The trachea is midline.  The lungs are symmetrically expanded bilaterally mildly coarse interstitial attenuation and bilateral basilar subsegmental atelectasis.  There may be a calcified granuloma projected over the right lower lung zone..  No large focal consolidation seen.  There is no pneumothorax.  The osseous structures are remarkable for degenerative changes.  Sternotomy noted.  There is acute appearing fracture of left inferolateral rib 10..                              Vitals:    04/02/19 1642 04/02/19 1816 04/02/19 1900   BP: (!) 152/83 (!) 165/72 (!) 154/75   BP Location:  Right arm    Patient Position:  Sitting    Pulse: 100 89 85   Resp: 18 18 15   Temp: 98.9 °F (37.2  °C) 99.3 °F (37.4 °C) 98.5 °F (36.9 °C)   TempSrc: Oral Oral    SpO2: 97% 97% 99%   Weight: 76.7 kg (169 lb)     Height: 6' (1.829 m)          Medical Decision Making:   History:   Old Medical Records: I decided to obtain old medical records.  Old Records Summarized: records from another hospital.       <> Summary of Records: Patient had a piece of paper with [facility] that had instructions for rib fracture. Patient was prescribed Percocet.  Initial Assessment:   Patient is a 59-year-old male that presents the ED with rib pain. Patient went to an urgent care after a fall and was told that he had 2 broken ribs on the left.  He presents the ED today due to worsening pain.  Differential Diagnosis:   Includes but is not limited to rib fracture, atelectasis, pneumonia, pneumothorax, hemopneumothorax.  Abdomen is soft and nontender. I doubt intra-abdominal pathology.  Clinical Tests:   Radiological Study: Ordered and Reviewed  ED Management:  X-rays with acute fracture of left inferior lateral rib 10.  No significant pleural effusions.  No pneumothorax.    Patient updated with results.  His pain is most likely due to rib fracture.  I explain etiology.  Also explained that his pain will be severe for the next few weeks.  He was provided with a spirometer and educated on use to prevent complications.  Patient to continue Tylenol for mild-to-moderate pain.  He is to take narcotic pain medication that was prescribed to him for severe pain only.  I prescribed him Lidoderm patch, (but pharmacy called me and needed a prior authorization.  I changed it to Voltaren gel.)  All questions were answered.  Patient is comfortable with plan and stable for discharge.    I have reviewed patient's chart and discussed this case with my supervising MD.     Mary James PA-C  Emergent Department  [facility]  Spectralink #[id number] or #[id number]              Scribe Attestation:   Scribe #1: I performed the above  scribed service and the documentation accurately describes the services I performed. I attest to the accuracy of the note.    Attending Attestation:     Physician Attestation Statement for NP/PA:   I discussed this assessment and plan of this patient with the NP/PA, but I did not personally examine the patient. The face to face encounter was performed by the NP/PA.    Other NP/PA Attestation Additions:    History of Present Illness: 59 year old male with history of fall, here with worsening pain. Diagnosed with rib fracture. X ray shows rib fractuire at 10th rib. Instructed to continue pain medicines, use incentive spirometer, and return to ER for fever or worsening SOB.                       Clinical Impression:       ICD-10-CM ICD-9-CM   1. Rib fractures S22.39XA 807.00   2. Hx of fracture of rib Z87.81 V15.51   3. Hx of fracture of rib Z87.81 V15.51         Disposition:   Disposition: Discharged  Condition: Stable                        Mary James PA-C  04/03/19 [id number]

## 2019-04-02 NOTE — ED NOTES
Patient identifiers verified and correct for Mr Jo  C/C: Left rib pain SEE NN  APPEARANCE: awake and alert in NAD.  SKIN: warm, dry and intact. No breakdown or bruising.  MUSCULOSKELETAL: Patient moving all extremities spontaneously, no obvious swelling or deformities noted. Ambulates independently.  RESPIRATORY: Positive shortness of breath.Respirations unlabored. Positive weak cough< Breath Sounds coarse left  CARDIAC: Positive ;eft CP/rib pain , 2+ distal pulses; no peripheral edema  ABDOMEN: S/ND/NT, Denies nausea  : voids spontaneously, denies difficulty  Neurologic: AAO x 4; follows commands equal strength in all extremities; denies numbness/tingling. Denies dizziness Positive gen weakness

## 2019-04-02 NOTE — ED TRIAGE NOTES
Patient states he fell out of back of truck yesterday, went to Urgent care, was told he had rib fracture.Pain to left ribs, onset yesterday after fall. Percocet rx 2 hours PTA. Pain worse with any movement.

## 2019-04-03 NOTE — DISCHARGE INSTRUCTIONS
Continue taking Percocet as prescribed for pain relief.  Use incentive spirometer 4 times a day which is every 6 hr.    Call and follow up with primary care physician if your symptoms do not improve or worsen return to the emergency department for signs and symptoms such as fever, chills coughing up blood, chest pain, difficulty breathing, or any concerning signs or symptoms.    No future appointments.    Our goal in the emergency department is to always give you outstanding care and exceptional service. You may receive a survey by mail or e-mail in the next week regarding your experience in our ED. We would greatly appreciate your completing and returning the survey. Your feedback provides us with a way to recognize our staff who give very good care and it helps us learn how to improve when your experience was below our aspiration of excellence.

## 2019-07-08 ENCOUNTER — HOSPITAL ENCOUNTER (OUTPATIENT)
Facility: HOSPITAL | Age: 59
Discharge: HOME | End: 2019-07-09
Attending: EMERGENCY MEDICINE | Admitting: FAMILY MEDICINE
Payer: COMMERCIAL

## 2019-07-08 DIAGNOSIS — R55 NEAR SYNCOPE: ICD-10-CM

## 2019-07-08 DIAGNOSIS — R07.9 CHEST PAIN: ICD-10-CM

## 2019-07-08 DIAGNOSIS — I24.9 ACS (ACUTE CORONARY SYNDROME): ICD-10-CM

## 2019-07-08 DIAGNOSIS — I10 HYPERTENSION, UNSPECIFIED TYPE: Primary | ICD-10-CM

## 2019-07-08 PROBLEM — K21.9 GASTROESOPHAGEAL REFLUX DISEASE WITHOUT ESOPHAGITIS: Status: ACTIVE | Noted: 2019-07-08

## 2019-07-08 PROBLEM — F10.10 ALCOHOL ABUSE: Status: ACTIVE | Noted: 2019-07-08

## 2019-07-08 LAB
ALBUMIN SERPL BCP-MCNC: 3.9 G/DL (ref 3.5–5.2)
ALP SERPL-CCNC: 100 U/L (ref 55–135)
ALT SERPL W/O P-5'-P-CCNC: 36 U/L (ref 10–44)
ANION GAP SERPL CALC-SCNC: 12 MMOL/L (ref 8–16)
AST SERPL-CCNC: 33 U/L (ref 10–40)
BASOPHILS # BLD AUTO: 0.03 K/UL (ref 0–0.2)
BASOPHILS NFR BLD: 0.4 % (ref 0–1.9)
BILIRUB SERPL-MCNC: 0.5 MG/DL (ref 0.1–1)
BNP SERPL-MCNC: 29 PG/ML (ref 0–99)
BUN SERPL-MCNC: 12 MG/DL (ref 6–20)
CALCIUM SERPL-MCNC: 9.4 MG/DL (ref 8.7–10.5)
CHLORIDE SERPL-SCNC: 105 MMOL/L (ref 95–110)
CO2 SERPL-SCNC: 21 MMOL/L (ref 23–29)
CREAT SERPL-MCNC: 1 MG/DL (ref 0.5–1.4)
CV STRESS BASE HR: 89 BPM
DIASTOLIC BLOOD PRESSURE: 83 MMHG
DIFFERENTIAL METHOD: ABNORMAL
EOSINOPHIL # BLD AUTO: 0.1 K/UL (ref 0–0.5)
EOSINOPHIL NFR BLD: 0.8 % (ref 0–8)
ERYTHROCYTE [DISTWIDTH] IN BLOOD BY AUTOMATED COUNT: 13.3 % (ref 11.5–14.5)
EST. GFR  (AFRICAN AMERICAN): >60 ML/MIN/1.73 M^2
EST. GFR  (NON AFRICAN AMERICAN): >60 ML/MIN/1.73 M^2
GLUCOSE SERPL-MCNC: 163 MG/DL (ref 70–110)
HCT VFR BLD AUTO: 45.6 % (ref 40–54)
HGB BLD-MCNC: 15.7 G/DL (ref 14–18)
LYMPHOCYTES # BLD AUTO: 2.2 K/UL (ref 1–4.8)
LYMPHOCYTES NFR BLD: 27.5 % (ref 18–48)
MCH RBC QN AUTO: 32.7 PG (ref 27–31)
MCHC RBC AUTO-ENTMCNC: 34.4 G/DL (ref 32–36)
MCV RBC AUTO: 95 FL (ref 82–98)
MONOCYTES # BLD AUTO: 0.4 K/UL (ref 0.3–1)
MONOCYTES NFR BLD: 5.3 % (ref 4–15)
NEUTROPHILS # BLD AUTO: 5.2 K/UL (ref 1.8–7.7)
NEUTROPHILS NFR BLD: 66 % (ref 38–73)
OHS CV CPX 1 MINUTE RECOVERY HEART RATE: 107 BPM
OHS CV CPX 85 PERCENT MAX PREDICTED HEART RATE MALE: 137
OHS CV CPX ESTIMATED METS: 7
OHS CV CPX MAX PREDICTED HEART RATE: 161
OHS CV CPX PATIENT IS FEMALE: 0
OHS CV CPX PATIENT IS MALE: 1
OHS CV CPX PEAK DIASTOLIC BLOOD PRESSURE: 84 MMHG
OHS CV CPX PEAK HEAR RATE: 139 BPM
OHS CV CPX PEAK RATE PRESSURE PRODUCT: NORMAL
OHS CV CPX PEAK SYSTOLIC BLOOD PRESSURE: 217 MMHG
OHS CV CPX PERCENT MAX PREDICTED HEART RATE ACHIEVED: 86
OHS CV CPX RATE PRESSURE PRODUCT PRESENTING: NORMAL
PLATELET # BLD AUTO: 218 K/UL (ref 150–350)
PMV BLD AUTO: 9.6 FL (ref 9.2–12.9)
POTASSIUM SERPL-SCNC: 4 MMOL/L (ref 3.5–5.1)
PROT SERPL-MCNC: 7.6 G/DL (ref 6–8.4)
RBC # BLD AUTO: 4.8 M/UL (ref 4.6–6.2)
SODIUM SERPL-SCNC: 138 MMOL/L (ref 136–145)
STRESS ECHO POST EXERCISE DUR MIN: 5 MINUTES
STRESS ECHO POST EXERCISE DUR SEC: 17 SECONDS
STRESS ECHO TARGET HR: 136.85 BPM
SYSTOLIC BLOOD PRESSURE: 135 MMHG
TROPONIN I SERPL DL<=0.01 NG/ML-MCNC: <0.006 NG/ML (ref 0–0.03)
TROPONIN I SERPL DL<=0.01 NG/ML-MCNC: <0.006 NG/ML (ref 0–0.03)
WBC # BLD AUTO: 7.93 K/UL (ref 3.9–12.7)

## 2019-07-08 PROCEDURE — 99285 EMERGENCY DEPT VISIT HI MDM: CPT | Mod: 25

## 2019-07-08 PROCEDURE — 93005 ELECTROCARDIOGRAM TRACING: CPT

## 2019-07-08 PROCEDURE — 36415 COLL VENOUS BLD VENIPUNCTURE: CPT

## 2019-07-08 PROCEDURE — 85025 COMPLETE CBC W/AUTO DIFF WBC: CPT

## 2019-07-08 PROCEDURE — 99220 PR INITIAL OBSERVATION CARE,LEVL III: ICD-10-PCS | Mod: ,,, | Performed by: INTERNAL MEDICINE

## 2019-07-08 PROCEDURE — G0378 HOSPITAL OBSERVATION PER HR: HCPCS

## 2019-07-08 PROCEDURE — 25000003 PHARM REV CODE 250: Performed by: EMERGENCY MEDICINE

## 2019-07-08 PROCEDURE — 84484 ASSAY OF TROPONIN QUANT: CPT | Mod: 91

## 2019-07-08 PROCEDURE — 94761 N-INVAS EAR/PLS OXIMETRY MLT: CPT

## 2019-07-08 PROCEDURE — 99220 PR INITIAL OBSERVATION CARE,LEVL III: CPT | Mod: ,,, | Performed by: INTERNAL MEDICINE

## 2019-07-08 PROCEDURE — 80053 COMPREHEN METABOLIC PANEL: CPT

## 2019-07-08 PROCEDURE — 83880 ASSAY OF NATRIURETIC PEPTIDE: CPT

## 2019-07-08 PROCEDURE — 25000003 PHARM REV CODE 250: Performed by: STUDENT IN AN ORGANIZED HEALTH CARE EDUCATION/TRAINING PROGRAM

## 2019-07-08 PROCEDURE — 84484 ASSAY OF TROPONIN QUANT: CPT

## 2019-07-08 RX ORDER — EZETIMIBE 10 MG/1
10 TABLET ORAL DAILY
Status: DISCONTINUED | OUTPATIENT
Start: 2019-07-08 | End: 2019-07-09 | Stop reason: HOSPADM

## 2019-07-08 RX ORDER — CLOPIDOGREL BISULFATE 75 MG/1
75 TABLET ORAL DAILY
Status: DISCONTINUED | OUTPATIENT
Start: 2019-07-08 | End: 2019-07-09 | Stop reason: HOSPADM

## 2019-07-08 RX ORDER — NITROGLYCERIN 0.4 MG/1
0.4 TABLET SUBLINGUAL EVERY 5 MIN PRN
Status: DISCONTINUED | OUTPATIENT
Start: 2019-07-08 | End: 2019-07-09 | Stop reason: HOSPADM

## 2019-07-08 RX ORDER — LORAZEPAM 1 MG/1
1 TABLET ORAL
Status: COMPLETED | OUTPATIENT
Start: 2019-07-08 | End: 2019-07-08

## 2019-07-08 RX ORDER — THIAMINE HCL 100 MG
100 TABLET ORAL DAILY
Status: DISCONTINUED | OUTPATIENT
Start: 2019-07-08 | End: 2019-07-09 | Stop reason: HOSPADM

## 2019-07-08 RX ORDER — SERTRALINE HYDROCHLORIDE 25 MG/1
25 TABLET, FILM COATED ORAL DAILY
Status: DISCONTINUED | OUTPATIENT
Start: 2019-07-08 | End: 2019-07-09 | Stop reason: HOSPADM

## 2019-07-08 RX ORDER — AMOXICILLIN 250 MG
1 CAPSULE ORAL DAILY
Status: DISCONTINUED | OUTPATIENT
Start: 2019-07-08 | End: 2019-07-09 | Stop reason: HOSPADM

## 2019-07-08 RX ORDER — ACETAMINOPHEN 325 MG/1
650 TABLET ORAL EVERY 4 HOURS PRN
Status: DISCONTINUED | OUTPATIENT
Start: 2019-07-08 | End: 2019-07-09 | Stop reason: HOSPADM

## 2019-07-08 RX ORDER — ONDANSETRON 2 MG/ML
4 INJECTION INTRAMUSCULAR; INTRAVENOUS EVERY 8 HOURS PRN
Status: DISCONTINUED | OUTPATIENT
Start: 2019-07-08 | End: 2019-07-09 | Stop reason: HOSPADM

## 2019-07-08 RX ORDER — NAPROXEN SODIUM 220 MG/1
81 TABLET, FILM COATED ORAL DAILY
Status: DISCONTINUED | OUTPATIENT
Start: 2019-07-08 | End: 2019-07-09 | Stop reason: HOSPADM

## 2019-07-08 RX ORDER — PANTOPRAZOLE SODIUM 40 MG/1
40 TABLET, DELAYED RELEASE ORAL DAILY
Status: DISCONTINUED | OUTPATIENT
Start: 2019-07-08 | End: 2019-07-09 | Stop reason: HOSPADM

## 2019-07-08 RX ORDER — ASPIRIN 325 MG
325 TABLET ORAL
Status: COMPLETED | OUTPATIENT
Start: 2019-07-08 | End: 2019-07-08

## 2019-07-08 RX ORDER — ATORVASTATIN CALCIUM 40 MG/1
80 TABLET, FILM COATED ORAL NIGHTLY
Status: DISCONTINUED | OUTPATIENT
Start: 2019-07-08 | End: 2019-07-09 | Stop reason: HOSPADM

## 2019-07-08 RX ORDER — ACETAMINOPHEN 325 MG/1
650 TABLET ORAL EVERY 8 HOURS PRN
Status: DISCONTINUED | OUTPATIENT
Start: 2019-07-08 | End: 2019-07-09 | Stop reason: HOSPADM

## 2019-07-08 RX ORDER — MORPHINE SULFATE 4 MG/ML
4 INJECTION, SOLUTION INTRAMUSCULAR; INTRAVENOUS EVERY 4 HOURS PRN
Status: DISCONTINUED | OUTPATIENT
Start: 2019-07-08 | End: 2019-07-09 | Stop reason: HOSPADM

## 2019-07-08 RX ORDER — SODIUM CHLORIDE 0.9 % (FLUSH) 0.9 %
5 SYRINGE (ML) INJECTION
Status: DISCONTINUED | OUTPATIENT
Start: 2019-07-08 | End: 2019-07-09 | Stop reason: HOSPADM

## 2019-07-08 RX ORDER — RAMELTEON 8 MG/1
8 TABLET ORAL NIGHTLY PRN
Status: DISCONTINUED | OUTPATIENT
Start: 2019-07-08 | End: 2019-07-09 | Stop reason: HOSPADM

## 2019-07-08 RX ADMIN — ASPIRIN 325 MG ORAL TABLET 325 MG: 325 PILL ORAL at 10:07

## 2019-07-08 RX ADMIN — LORAZEPAM 1 MG: 1 TABLET ORAL at 10:07

## 2019-07-08 RX ADMIN — Medication 100 MG: at 05:07

## 2019-07-08 RX ADMIN — NITROGLYCERIN 1 INCH: 20 OINTMENT TOPICAL at 10:07

## 2019-07-08 RX ADMIN — ATORVASTATIN CALCIUM 80 MG: 40 TABLET, FILM COATED ORAL at 08:07

## 2019-07-08 NOTE — SUBJECTIVE & OBJECTIVE
Past Medical History:   Diagnosis Date    Anxiety     Cerebral infarction due to embolism of left middle cerebral artery 9/25/2017    Coronary artery disease     Stroke     Stroke due to stenosis of left carotid artery 1/9/2018    Stroke due to stenosis of left carotid artery 1/9/2018       Past Surgical History:   Procedure Laterality Date    BYPASS GRAFT      triple bypass 2009    CARDIAC SURGERY      triple bypass x 2    Endarterectomy-Carotid Left 11/17/2017    [facility]    ROTATOR CUFF REPAIR Right        Review of patient's allergies indicates:  No Known Allergies    No current facility-administered medications on file prior to encounter.      Current Outpatient Medications on File Prior to Encounter   Medication Sig    aspirin 81 MG Chew Take 81 mg by mouth once daily.    atorvastatin (LIPITOR) 80 MG tablet Take 1 tablet (80 mg total) by mouth every evening.    clopidogrel (PLAVIX) 75 mg tablet Take 1 tablet (75 mg total) by mouth once daily.    ezetimibe (ZETIA) 10 mg tablet Take 1 tablet (10 mg total) by mouth once daily.    lidocaine (LIDODERM) 5 % Place 1 patch onto the skin once daily. Remove & Discard patch within 12 hours or as directed by MD    omeprazole (PRILOSEC) 40 MG capsule Take 1 capsule (40 mg total) by mouth once daily.    sertraline (ZOLOFT) 25 MG tablet Take 1 tablet (25 mg total) by mouth once daily.    nitroGLYCERIN (NITROSTAT) 0.4 MG SL tablet Place 0.4 mg under the tongue.    oxyCODONE-acetaminophen (PERCOCET) 5-325 mg per tablet Take 1 tablet by mouth every 4 (four) hours as needed for Pain.     Family History     Problem Relation (Age of Onset)    Diabetes Mother    Heart disease Mother, Father, Brother (56)    Stroke Mother        Tobacco Use    Smoking status: Current Every Day Smoker     Packs/day: 1.00     Years: 20.00     Pack years: 20.00     Types: Cigarettes    Smokeless tobacco: Never Used   Substance and Sexual  Activity    Alcohol use: Yes     Comment: every 3 days, none today    Drug use: No    Sexual activity: Not Currently     Partners: Female     Review of Systems   Constitution: Positive for diaphoresis.   Cardiovascular: Positive for chest pain and dyspnea on exertion. Negative for irregular heartbeat, leg swelling, near-syncope, orthopnea, palpitations, paroxysmal nocturnal dyspnea and syncope.   Respiratory: Positive for shortness of breath. Negative for cough.    Gastrointestinal: Negative.    Genitourinary: Negative.    Neurological: Positive for dizziness, light-headedness and weakness.   Psychiatric/Behavioral: Positive for substance abuse. The patient is nervous/anxious.      Objective:     Vital Signs (Most Recent):  Temp: 97.9 °F (36.6 °C) (07/08/19 1013)  Pulse: 78 (07/08/19 1202)  Resp: 20 (07/08/19 1202)  BP: 131/81 (07/08/19 1202)  SpO2: 95 % (07/08/19 1202) Vital Signs (24h Range):  Temp:  [97.9 °F (36.6 °C)] 97.9 °F (36.6 °C)  Pulse:  [78-92] 78  Resp:  [10-24] 20  SpO2:  [93 %-98 %] 95 %  BP: (115-202)/(68-94) 131/81     Weight: 74.8 kg (165 lb)  Body mass index is 22.38 kg/m².    SpO2: 95 %  O2 Device (Oxygen Therapy): room air    No intake or output data in the 24 hours ending 07/08/19 1226    Lines/Drains/Airways     Peripheral Intravenous Line                 Peripheral IV - Single Lumen 07/08/19 1008 20 G Left Hand less than 1 day                Physical Exam   Constitutional: He is oriented to person, place, and time. He appears well-developed and well-nourished. No distress.   HENT:   Head: Atraumatic.   Eyes: Right eye exhibits no discharge. Left eye exhibits no discharge.   Neck: No JVD present.   Cardiovascular: Normal rate, regular rhythm and normal heart sounds. Exam reveals no gallop and no friction rub.   No murmur heard.  Pulmonary/Chest: Effort normal and breath sounds normal.   Abdominal: Soft. Bowel sounds are normal.   Musculoskeletal: He exhibits no edema.   Neurological: He is  alert and oriented to person, place, and time.   Skin: Skin is warm and dry.       Significant Labs:   BMP:   Recent Labs   Lab 07/08/19  1008   *      K 4.0      CO2 21*   BUN 12   CREATININE 1.0   CALCIUM 9.4   , CMP   Recent Labs   Lab 07/08/19  1008      K 4.0      CO2 21*   *   BUN 12   CREATININE 1.0   CALCIUM 9.4   PROT 7.6   ALBUMIN 3.9   BILITOT 0.5   ALKPHOS 100   AST 33   ALT 36   ANIONGAP 12   ESTGFRAFRICA >60   EGFRNONAA >60   , CBC   Recent Labs   Lab 07/08/19  1008   WBC 7.93   HGB 15.7   HCT 45.6      , INR No results for input(s): INR, PROTIME in the last 48 hours., Lipid Panel No results for input(s): CHOL, HDL, LDLCALC, TRIG, CHOLHDL in the last 48 hours., Troponin   Recent Labs   Lab 07/08/19  1008   TROPONINI <0.006    and All pertinent lab results from the last 24 hours have been reviewed.    Significant Imaging: Echocardiogram:   2D echo with color flow doppler:   Results for orders placed or performed during the hospital encounter of 09/24/17   Echo doppler color flow   Result Value Ref Range    QEF 60 55 - 65    Narrative    Date of Procedure: 09/26/2017        TEST DESCRIPTION   Technical Quality: This is a portable study performed at the patient's bedside. This is a technically adequate study.     Aorta: The aortic root is normal in size, measuring 2.8 cm at sinotubular junction and 2.9 cm at Sinuses of Valsalva. The proximal ascending aorta is normal in size, measuring 2.9 cm across.     Left Atrium: The left atrial volume index is normal, measuring 34.48 cc/m2.     Left Ventricle: The left ventricle is normal in size, with an end-diastolic diameter of 3.8 cm, and an end-systolic diameter of 3.0 cm. Wall thickness is mildly increased, with the septum and the posterior wall each measuring 1.2 cm across. Relative wall   thickness was increased at 0.63, and the LV mass index was 91.3 g/m2 consistent with concentric remodeling. There are no  regional wall motion abnormalities. Left ventricular systolic function appears normal. Visually estimated ejection fraction is   60-65%. The LV Doppler derived stroke volume equals 65.0 ccs.     Diastolic indices: E wave velocity 0.7 m/s, E/A ratio 1.0,  msec., E/e' ratio(avg) 8. Diastolic function is indeterminate.     Right Atrium: The right atrium is normal in size, measuring 4.3 cm in length and 3.4 cm in width in the apical view.     Right Ventricle: The right ventricle is normal in size measuring 3.6 cm at the base in the apical right ventricle-focused view. Global right ventricular systolic function appears normal. Tricuspid annular plane systolic excursion (TAPSE) is 1.8 cm.     Aortic Valve:  Aortic valve is normal in structure with normal leaflet mobility.     Mitral Valve:  Mitral valve is normal in structure with normal leaflet mobility.     Tricuspid Valve:  Tricuspid valve is normal in structure with normal leaflet mobility.     Pulmonary Valve:  Pulmonary valve is normal in structure with normal leaflet mobility.     IVC: IVC is normal in size and collapses > 50% with a sniff, suggesting normal right atrial pressure of 3 mmHg.     Intracavitary: There is no evidence of pericardial effusion, intracavity mass, thrombi, or vegetation.         CONCLUSIONS     1 - Concentric remodeling.     2 - No wall motion abnormalities.     3 - Normal left ventricular systolic function (EF 60-65%).     4 - Indeterminate LV diastolic function.     5 - Normal right ventricular systolic function .             This document has been electronically    SIGNED BY: Nani Stephens MD On: 12/13/2017 13:55    This document was originally electronically signed on: 09/26/2017 10:53    This document was last amended on: 12/13/2017 13:55    and Transthoracic echo (TTE) complete (Cupid Only): No results found for this or any previous visit.

## 2019-07-08 NOTE — H&P
[facility] Medicine  History & Physical    Patient Name: Mina Jo  MRN: [id number]  Admission Date: 7/8/2019  Attending Physician: Gabriel Marlow MD   Primary Care Provider: Med Bowei MD         Patient information was obtained from patient and ER records.     Subjective:     Principal Problem:ACS (acute coronary syndrome)    Chief Complaint:   Chief Complaint   Patient presents with    Chest Pain     left sided chest pain with radiation to jaw that began yesterday.         HPI: 60 yo male with history of CABG in 2009, TIA, CEA in 2019, alcohol abuse presenting with a 2 week history of fatigue and SOB on exertion. Patient was sob walking from his car and the store. SOB improved with 20 minutes of rest. Patient reports that the sensation of fatigue was similar to the presentation to prior to his CABG. Yesterday, patient had chest pressure that radiated to his left jaw and left arm. Patient did not have time to come to the hospital at that time. This morning, patient reported a near syncopal episode with diaphoresis and lightheadedness. Patient reports compliance with all of his medications besides Aspirin.     Of note, patient had a left rib fracture 3 months ago and this pain is not the same.     In the ED, patient's blood pressure was 200s/90s. Patient received Nitroglycerin ointment and Alprazolam. Patient blood pressure improved to 130/80s.    Patient reports drinking 6pack of 16 ounces of beer daily since 18 years old. Patient denies a history of DTs but has not stopped drinking. Patient had a remote history of cocaine use but denies current use. Smoke 1 pack of cigarette/day for 41 years.     Past Medical History:   Diagnosis Date    Anxiety     Cerebral infarction due to embolism of left middle cerebral artery 9/25/2017    Coronary artery disease     Stroke     Stroke due to stenosis of left carotid artery 1/9/2018    Stroke due to stenosis of left  carotid artery 1/9/2018       Past Surgical History:   Procedure Laterality Date    BYPASS GRAFT      triple bypass 2009    CARDIAC SURGERY      triple bypass x 2    Endarterectomy-Carotid Left 11/17/2017    [facility]    ROTATOR CUFF REPAIR Right        Review of patient's allergies indicates:  No Known Allergies    No current facility-administered medications on file prior to encounter.      Current Outpatient Medications on File Prior to Encounter   Medication Sig    aspirin 81 MG Chew Take 81 mg by mouth once daily.    atorvastatin (LIPITOR) 80 MG tablet Take 1 tablet (80 mg total) by mouth every evening.    clopidogrel (PLAVIX) 75 mg tablet Take 1 tablet (75 mg total) by mouth once daily.    ezetimibe (ZETIA) 10 mg tablet Take 1 tablet (10 mg total) by mouth once daily.    lidocaine (LIDODERM) 5 % Place 1 patch onto the skin once daily. Remove & Discard patch within 12 hours or as directed by MD    omeprazole (PRILOSEC) 40 MG capsule Take 1 capsule (40 mg total) by mouth once daily.    sertraline (ZOLOFT) 25 MG tablet Take 1 tablet (25 mg total) by mouth once daily.    nitroGLYCERIN (NITROSTAT) 0.4 MG SL tablet Place 0.4 mg under the tongue.    oxyCODONE-acetaminophen (PERCOCET) 5-325 mg per tablet Take 1 tablet by mouth every 4 (four) hours as needed for Pain.     Family History     Problem Relation (Age of Onset)    Diabetes Mother    Heart disease Mother, Father, Brother (56)    Stroke Mother        Tobacco Use    Smoking status: Current Every Day Smoker     Packs/day: 1.00     Years: 20.00     Pack years: 20.00     Types: Cigarettes    Smokeless tobacco: Never Used   Substance and Sexual Activity    Alcohol use: Yes     Comment: every 3 days, none today    Drug use: No    Sexual activity: Not Currently     Partners: Female     Review of Systems   Constitutional: Positive for diaphoresis. Negative for activity change, appetite change, fatigue, fever  and unexpected weight change.   HENT: Negative for congestion and sore throat.    Respiratory: Positive for shortness of breath. Negative for cough, choking, chest tightness and wheezing.    Cardiovascular: Positive for chest pain. Negative for palpitations and leg swelling.   Gastrointestinal: Negative for abdominal pain, constipation, diarrhea, nausea and vomiting.   Endocrine: Negative for polydipsia, polyphagia and polyuria.   Genitourinary: Negative for difficulty urinating and dysuria.   Musculoskeletal: Negative for gait problem and myalgias.   Skin: Negative for pallor, rash and wound.   Neurological: Positive for dizziness and light-headedness. Negative for tremors, seizures and syncope.     Objective:     Vital Signs (Most Recent):  Temp: 98.6 °F (37 °C) (07/08/19 1307)  Pulse: 78 (07/08/19 1202)  Resp: 20 (07/08/19 1202)  BP: 131/81 (07/08/19 1202)  SpO2: 95 % (07/08/19 1202) Vital Signs (24h Range):  Temp:  [97.9 °F (36.6 °C)-98.6 °F (37 °C)] 98.6 °F (37 °C)  Pulse:  [78-92] 78  Resp:  [10-24] 20  SpO2:  [93 %-98 %] 95 %  BP: (115-202)/(68-94) 131/81     Weight: 74.8 kg (165 lb)  Body mass index is 22.38 kg/m².    Physical Exam   Constitutional: He is oriented to person, place, and time. He appears well-developed and well-nourished. No distress.   HENT:   Head: Normocephalic and atraumatic.   Eyes: Conjunctivae and EOM are normal. Right eye exhibits no discharge. Left eye exhibits no discharge.   Neck: Normal range of motion. Neck supple. No tracheal deviation present. No thyromegaly present.   Cardiovascular: Normal rate, regular rhythm, normal heart sounds and intact distal pulses. Exam reveals no gallop and no friction rub.   No murmur heard.  Well-healed incision scar in the mid-chest   Pulmonary/Chest: Effort normal and breath sounds normal. No stridor. No respiratory distress. He has no wheezes.   Abdominal: Soft. Bowel sounds are normal. He exhibits no distension. There is no tenderness. There is  no guarding.   Musculoskeletal: Normal range of motion. He exhibits no edema, tenderness or deformity.   Neurological: He is alert and oriented to person, place, and time. No cranial nerve deficit. Coordination normal.   Not tremulous on exam   Skin: Skin is warm and dry. Capillary refill takes less than 2 seconds. He is not diaphoretic. No erythema.      Significant Labs:   Recent Lab Results       07/08/19  1008        Albumin 3.9     Alkaline Phosphatase 100     ALT 36     Anion Gap 12     AST 33  Comment:  Specimen slightly hemolyzed     Baso # 0.03     Basophil% 0.4     BILIRUBIN TOTAL 0.5  Comment:  For infants and newborns, interpretation of results should be based  on gestational age, weight and in agreement with clinical  observations.  Premature Infant recommended reference ranges:  Up to 24 hours.............<8.0 mg/dL  Up to 48 hours............<12.0 mg/dL  3-5 days..................<15.0 mg/dL  6-29 days.................<15.0 mg/dL       BNP 29  Comment:  Values of less than 100 pg/ml are consistent with non-CHF populations.     BUN, Bld 12     Calcium 9.4     Chloride 105     CO2 21     Creatinine 1.0     Differential Method Automated     eGFR if  >60     eGFR if non  >60  Comment:  Calculation used to obtain the estimated glomerular filtration  rate (eGFR) is the CKD-EPI equation.        Eos # 0.1     Eosinophil% 0.8     Glucose 163     Gran # (ANC) 5.2     Gran% 66.0     Hematocrit 45.6     Hemoglobin 15.7     Lymph # 2.2     Lymph% 27.5     MCH 32.7     MCHC 34.4     MCV 95     Mono # 0.4     Mono% 5.3     MPV 9.6     Platelets 218     Potassium 4.0     PROTEIN TOTAL 7.6     RBC 4.80     RDW 13.3     Sodium 138     Troponin I <0.006  Comment:  The reference interval for Troponin I represents the 99th percentile   cutoff   for our facility and is consistent with 3rd generation assay   performance.       WBC 7.93           Significant Imaging:   Imaging Results           NM Myocardial Perfusion Spect Multi Exer (In process)                X-Ray Chest AP Portable (Final result)  Result time 19 10:30:53    Final result by Gera Marroquin MD (19 10:30:53)                 Impression:      No significant interval change.      Electronically signed by: Gera Marroquin  Date:    2019  Time:    10:30             Narrative:    EXAMINATION:  XR CHEST AP PORTABLE    CLINICAL HISTORY:  Chest Pain;    TECHNIQUE:  Single frontal view of the chest was performed.    COMPARISON:  Chest PA and lateral dated 2019    FINDINGS:  Stable postsurgical changes.  Similar mild increased interstitial attenuation without lobar consolidation, pleural effusion, or pneumothorax.  Cardiomediastinal silhouette is within normal limits.  Remote left inferolateral 10th rib fracture with healing.                                  Assessment/Plan:     * ACS (acute coronary syndrome)  Reports a history of chest pressure that radiates to the left jaw and arm  Hx of CABG in   Trop and EKG were negative in ED  Will trend Trop and EKG x 3  [facility], Appreciate recs  On telemetry    Coronary artery disease involving native coronary artery  Hx of CABG in   Continue Aspirin and Plavix      Essential hypertension  BP on admission was 200/90s  Currently 130/80s after Nitro and Alprazolam.   BP goal < 140/80  Will continue to monitor      Gastroesophageal reflux disease without esophagitis  Continue home medications      Hx of CABG  Hx of CABG in   Continue Aspirin and Plavix  Consulted Cardiology    Combined hyperlipidemia  3/22/19 Lipid Panel: Chol: 197, Tri, HDL: 47, LDL: 105  Continue current medications        Bilateral carotid artery stenosis  Hx of bilateral carotid artery stenosis s/p CEA in   Stable        VTE Risk Mitigation (From admission, onward)        Ordered     IP VTE LOW RISK PATIENT  Once      19 1149     Place sequential compression  device  Until discontinued      07/08/19 2063        Dispo: Pending Trop and EKG, Cards Consult      Jailene Banuelos, PGY-2  [facility]  07/08/2019 1:52 PM

## 2019-07-08 NOTE — ASSESSMENT & PLAN NOTE
BP on admission was 200/90s  Currently 130/80s after Nitro and Alprazolam.   BP goal < 140/80  Will continue to monitor

## 2019-07-08 NOTE — ASSESSMENT & PLAN NOTE
History of CABG at [facility] x 3 vessels   Last LHC was 03/2015 at [facility] with patent LIMA-LAD, patent SVG OM, SVG-RCA with a 50% anastomotic lesion which was treated medically    Trop and EKG negative thus far  Recommend GDMT with asa, statin, BB, and ACEI (low dose initiation with up titration as tolerated)     Proceed with exercise nuclear stress test with further recs to follow

## 2019-07-08 NOTE — ASSESSMENT & PLAN NOTE
Reports a history of chest pressure that radiates to the left jaw and arm  Hx of CABG in 2009  Trop and EKG were negative in ED  Will trend Trop and EKG x 3  Consulted [facility], Appreciate recs  On telemetry

## 2019-07-08 NOTE — ED NOTES
Yesterday pt began having chest left sided jaw pain that radiated to the chest that feels like he was punched. Pt also having tunnel vision like his equilibrium is off while sitting and inside in the air conditioning. Pt states yesterday he was sweating while resting inside. Pt has also been short of breath with minimal exertion. Pt denies working outside. Pt has been caring for autistic daughter while having a broken rib completing transfers to and from wheelchair.

## 2019-07-08 NOTE — ED PROVIDER NOTES
Encounter Date: 7/8/2019    SCRIBE #1 NOTE: I, Efrem Sandro, am scribing for, and in the presence of,  Dr.Lefort. I have scribed the entire note.       History     Chief Complaint   Patient presents with    Chest Pain     left sided chest pain with radiation to jaw that began yesterday.      Mina Jo is a 59 y.o. male who  has a past medical history of Anxiety, Cerebral infarction due to embolism of left middle cerebral artery (9/25/2017), Coronary artery disease, Stroke, Stroke due to stenosis of left carotid artery (1/9/2018), and Stroke due to stenosis of left carotid artery (1/9/2018).    The patient presents to the ED due to chest pain that worsened yesterday. Patient also has left sided face pain and dizziness with blurry vision. He also had sweating without exertion. He believes he was near syncope when the dizziness came on. He does not work outside in the heat. Patient denies fever, chills, sore throat, cough, SOB, nausea, vomiting, dysuria, urinary frequency, urgency, back pain, wounds, LOC, weakness/numbness, easy bruising.    He states he does not take his hypertension medication anymore because it drops his blood pressure too low.   Patient has a hx of a broken rib 3 months ago.    The history is provided by the patient.     Review of patient's allergies indicates:  No Known Allergies  Past Medical History:   Diagnosis Date    Anxiety     Cerebral infarction due to embolism of left middle cerebral artery 9/25/2017    Coronary artery disease     Stroke     Stroke due to stenosis of left carotid artery 1/9/2018    Stroke due to stenosis of left carotid artery 1/9/2018     Past Surgical History:   Procedure Laterality Date    BYPASS GRAFT      triple bypass 2009    CARDIAC SURGERY      triple bypass x 2    Endarterectomy-Carotid Left 11/17/2017    Performed by [facility]jana [facility]el [facility] at [facility] OR 2ND FLR    ROTATOR CUFF REPAIR Right      Family History   Problem Relation Age of Onset     Stroke Mother     Heart disease Mother     Diabetes Mother     Heart disease Father     Heart disease Brother 56    Cancer Neg Hx     Kidney disease Neg Hx     Hypertension Neg Hx      Social History     Tobacco Use    Smoking status: Current Every Day Smoker     Packs/day: 1.00     Years: 20.00     Pack years: 20.00     Types: Cigarettes    Smokeless tobacco: Never Used   Substance Use Topics    Alcohol use: Yes     Comment: every 3 days, none today    Drug use: No     Review of Systems   Constitutional: Positive for diaphoresis. Negative for chills and fever.   HENT: Negative for congestion, ear pain, rhinorrhea and sore throat.    Eyes: Positive for visual disturbance.   Respiratory: Negative for cough, shortness of breath and wheezing.    Cardiovascular: Positive for chest pain. Negative for palpitations.   Gastrointestinal: Negative for abdominal pain, diarrhea, nausea and vomiting.   Genitourinary: Negative for dysuria and hematuria.   Musculoskeletal: Negative for back pain, myalgias and neck pain.   Skin: Negative for rash.   Neurological: Positive for dizziness, tremors, weakness and numbness. Negative for light-headedness and headaches.   Psychiatric/Behavioral: Negative for confusion.       Physical Exam     Initial Vitals [07/08/19 0950]   BP Pulse Resp Temp SpO2   (!) 202/94 90 20 97.9 °F (36.6 °C) 98 %      MAP       --         Physical Exam    Nursing note and vitals reviewed.  Constitutional: He appears well-developed and well-nourished.   HENT:   Head: Normocephalic and atraumatic.   Eyes: Conjunctivae and EOM are normal.   Neck: Neck supple.   Cardiovascular: Normal rate, regular rhythm, normal heart sounds and intact distal pulses. Exam reveals no gallop and no friction rub.    No murmur heard.  Pulmonary/Chest: Breath sounds normal. He has no wheezes. He has no rhonchi. He has no rales.   Abdominal: Soft. He exhibits no distension. There is no tenderness.   Musculoskeletal: Normal  range of motion.   Neurological: He is alert and oriented to person, place, and time.   Skin: No rash noted. No erythema.   Psychiatric: He has a normal mood and affect.         ED Course   Procedures  Labs Reviewed   CBC W/ AUTO DIFFERENTIAL - Abnormal; Notable for the following components:       Result Value    Mean Corpuscular Hemoglobin 32.7 (*)     All other components within normal limits   COMPREHENSIVE METABOLIC PANEL - Abnormal; Notable for the following components:    CO2 21 (*)     Glucose 163 (*)     All other components within normal limits   TROPONIN I   B-TYPE NATRIURETIC PEPTIDE     EKG Readings: (Independently Interpreted)   Rate 86 bpm  NSR   No ST segment changes  No T wave inversions  No STEMI       ECG Results          EKG 12-lead (In process)  Result time 07/08/19 10:09:16    In process by Interface, Lab In [facility] (07/08/19 10:09:16)                 Narrative:    Test Reason : R07.9,    Vent. Rate : 086 BPM     Atrial Rate : 086 BPM     P-R Int : 160 ms          QRS Dur : 088 ms      QT Int : 350 ms       P-R-T Axes : 027 078 064 degrees     QTc Int : 418 ms    Normal sinus rhythm  Normal ECG  When compared with ECG of 02-APR-2019 16:47,  QT has shortened    Referred By: System System           Confirmed By:                             Imaging Results          NM Myocardial Perfusion Spect Multi Exer (Final result)  Result time 07/08/19 16:49:56    Final result by Sanford Skinner II, MD (07/08/19 16:49:56)                 Impression:      1. No convincing scintigraphic evidence of ischemia or infarct.  2. No definite wall motion abnormalities.  3. Left ventricular ejection fraction 57% during stress and 60% during rest.      Electronically signed by: Sanford Skinner  Date:    07/08/2019  Time:    16:49             Narrative:    EXAMINATION:  NM MYOCARDIAL PERFUSION SPECT MULTI STUDY    CLINICAL HISTORY:  Chest pain, acute, nonspecific, low prob CAD;    TECHNIQUE:  SPECT images through the  heart were acquired following the intravenous administration of 12 mCi Tc-99m tetrofosmin at rest. SPECT images were then acquired following the intravenous administration of 27 mCi Tc-99m tetrofosmin during a graded exercise cardiac stress. The clinical stress and ECG portion of the study will be interpreted separately.    COMPARISON:  None available    FINDINGS:  No definite wall motion abnormalities. No stress-induced perfusion defects to suggest ischemia. No fixed defects to suggest infarct.    Stress and rest end diastolic volumes 65 and 77 mL, respectively. Stress and rest end systolic volumes 28 and 25 mL, respectively. Estimated left ventricular ejection fraction 57% during stress and 68% during rest.                               X-Ray Chest AP Portable (Final result)  Result time 07/08/19 10:30:53    Final result by Gera Marroquin MD (07/08/19 10:30:53)                 Impression:      No significant interval change.      Electronically signed by: Gera Marroquin  Date:    07/08/2019  Time:    10:30             Narrative:    EXAMINATION:  XR CHEST AP PORTABLE    CLINICAL HISTORY:  Chest Pain;    TECHNIQUE:  Single frontal view of the chest was performed.    COMPARISON:  Chest PA and lateral dated 04/02/2019    FINDINGS:  Stable postsurgical changes.  Similar mild increased interstitial attenuation without lobar consolidation, pleural effusion, or pneumothorax.  Cardiomediastinal silhouette is within normal limits.  Remote left inferolateral 10th rib fracture with healing.                                 Medical Decision Making:   Differential Diagnosis:   MI, PE, PTX, PNA, aortic dz  Independently Interpreted Test(s):   I have ordered and independently interpreted X-rays - see prior notes.  I have ordered and independently interpreted EKG Reading(s) - see prior notes  Clinical Tests:   Lab Tests: Ordered and Reviewed  Radiological Study: Ordered and Reviewed  Medical Tests: Reviewed and Ordered  ED  Management:  Moderate risk heart score with negative ED workup, will place in obs for further management.                      Clinical Impression:       ICD-10-CM ICD-9-CM   1. Hypertension, unspecified type I10 401.9   2. Chest pain R07.9 786.50   3. Near syncope R55 780.2   4. ACS (acute coronary syndrome) I24.9 411.1           Disposition:   Disposition: Placed in Observation  Condition: Stable       I, Dr. Guy Lefort, personally performed the services described in this documentation. All medical record entries made by the scribe were at my direction and in my presence. I have reviewed the chart and agree that the record reflects my personal performance and is accurate and complete. Guy Lefort, MD.  11:12 PM 07/09/2019   scribe attestation                  Guy J. Lefort, MD  07/09/19 4826
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
## 2019-07-08 NOTE — SUBJECTIVE & OBJECTIVE
Past Medical History:   Diagnosis Date    Anxiety     Cerebral infarction due to embolism of left middle cerebral artery 9/25/2017    Coronary artery disease     Stroke     Stroke due to stenosis of left carotid artery 1/9/2018    Stroke due to stenosis of left carotid artery 1/9/2018       Past Surgical History:   Procedure Laterality Date    BYPASS GRAFT      triple bypass 2009    CARDIAC SURGERY      triple bypass x 2    Endarterectomy-Carotid Left 11/17/2017    [facility]    ROTATOR CUFF REPAIR Right        Review of patient's allergies indicates:  No Known Allergies    No current facility-administered medications on file prior to encounter.      Current Outpatient Medications on File Prior to Encounter   Medication Sig    aspirin 81 MG Chew Take 81 mg by mouth once daily.    atorvastatin (LIPITOR) 80 MG tablet Take 1 tablet (80 mg total) by mouth every evening.    clopidogrel (PLAVIX) 75 mg tablet Take 1 tablet (75 mg total) by mouth once daily.    ezetimibe (ZETIA) 10 mg tablet Take 1 tablet (10 mg total) by mouth once daily.    lidocaine (LIDODERM) 5 % Place 1 patch onto the skin once daily. Remove & Discard patch within 12 hours or as directed by MD    omeprazole (PRILOSEC) 40 MG capsule Take 1 capsule (40 mg total) by mouth once daily.    sertraline (ZOLOFT) 25 MG tablet Take 1 tablet (25 mg total) by mouth once daily.    nitroGLYCERIN (NITROSTAT) 0.4 MG SL tablet Place 0.4 mg under the tongue.    oxyCODONE-acetaminophen (PERCOCET) 5-325 mg per tablet Take 1 tablet by mouth every 4 (four) hours as needed for Pain.     Family History     Problem Relation (Age of Onset)    Diabetes Mother    Heart disease Mother, Father, Brother (56)    Stroke Mother        Tobacco Use    Smoking status: Current Every Day Smoker     Packs/day: 1.00     Years: 20.00     Pack years: 20.00     Types: Cigarettes    Smokeless tobacco: Never Used   Substance and Sexual  Activity    Alcohol use: Yes     Comment: every 3 days, none today    Drug use: No    Sexual activity: Not Currently     Partners: Female     Review of Systems   Constitutional: Positive for diaphoresis. Negative for activity change, appetite change, fatigue, fever and unexpected weight change.   HENT: Negative for congestion and sore throat.    Respiratory: Positive for shortness of breath. Negative for cough, choking, chest tightness and wheezing.    Cardiovascular: Positive for chest pain. Negative for palpitations and leg swelling.   Gastrointestinal: Negative for abdominal pain, constipation, diarrhea, nausea and vomiting.   Endocrine: Negative for polydipsia, polyphagia and polyuria.   Genitourinary: Negative for difficulty urinating and dysuria.   Musculoskeletal: Negative for gait problem and myalgias.   Skin: Negative for pallor, rash and wound.   Neurological: Positive for dizziness and light-headedness. Negative for tremors, seizures and syncope.     Objective:     Vital Signs (Most Recent):  Temp: 98.6 °F (37 °C) (07/08/19 1307)  Pulse: 78 (07/08/19 1202)  Resp: 20 (07/08/19 1202)  BP: 131/81 (07/08/19 1202)  SpO2: 95 % (07/08/19 1202) Vital Signs (24h Range):  Temp:  [97.9 °F (36.6 °C)-98.6 °F (37 °C)] 98.6 °F (37 °C)  Pulse:  [78-92] 78  Resp:  [10-24] 20  SpO2:  [93 %-98 %] 95 %  BP: (115-202)/(68-94) 131/81     Weight: 74.8 kg (165 lb)  Body mass index is 22.38 kg/m².    Physical Exam   Constitutional: He is oriented to person, place, and time. He appears well-developed and well-nourished. No distress.   HENT:   Head: Normocephalic and atraumatic.   Eyes: Conjunctivae and EOM are normal. Right eye exhibits no discharge. Left eye exhibits no discharge.   Neck: Normal range of motion. Neck supple. No tracheal deviation present. No thyromegaly present.   Cardiovascular: Normal rate, regular rhythm, normal heart sounds and intact distal pulses. Exam reveals no gallop and no friction rub.   No murmur  heard.  Well-healed incision scar in the mid-chest   Pulmonary/Chest: Effort normal and breath sounds normal. No stridor. No respiratory distress. He has no wheezes.   Abdominal: Soft. Bowel sounds are normal. He exhibits no distension. There is no tenderness. There is no guarding.   Musculoskeletal: Normal range of motion. He exhibits no edema, tenderness or deformity.   Neurological: He is alert and oriented to person, place, and time. No cranial nerve deficit. Coordination normal.   Not tremulous on exam   Skin: Skin is warm and dry. Capillary refill takes less than 2 seconds. He is not diaphoretic. No erythema.      Significant Labs:   Recent Lab Results       07/08/19  1008        Albumin 3.9     Alkaline Phosphatase 100     ALT 36     Anion Gap 12     AST 33  Comment:  Specimen slightly hemolyzed     Baso # 0.03     Basophil% 0.4     BILIRUBIN TOTAL 0.5  Comment:  For infants and newborns, interpretation of results should be based  on gestational age, weight and in agreement with clinical  observations.  Premature Infant recommended reference ranges:  Up to 24 hours.............<8.0 mg/dL  Up to 48 hours............<12.0 mg/dL  3-5 days..................<15.0 mg/dL  6-29 days.................<15.0 mg/dL       BNP 29  Comment:  Values of less than 100 pg/ml are consistent with non-CHF populations.     BUN, Bld 12     Calcium 9.4     Chloride 105     CO2 21     Creatinine 1.0     Differential Method Automated     eGFR if  >60     eGFR if non  >60  Comment:  Calculation used to obtain the estimated glomerular filtration  rate (eGFR) is the CKD-EPI equation.        Eos # 0.1     Eosinophil% 0.8     Glucose 163     Gran # (ANC) 5.2     Gran% 66.0     Hematocrit 45.6     Hemoglobin 15.7     Lymph # 2.2     Lymph% 27.5     MCH 32.7     MCHC 34.4     MCV 95     Mono # 0.4     Mono% 5.3     MPV 9.6     Platelets 218     Potassium 4.0     PROTEIN TOTAL 7.6     RBC 4.80     RDW 13.3      Sodium 138     Troponin I <0.006  Comment:  The reference interval for Troponin I represents the 99th percentile   cutoff   for our facility and is consistent with 3rd generation assay   performance.       WBC 7.93           Significant Imaging:   Imaging Results          NM Myocardial Perfusion Spect Multi Exer (In process)                X-Ray Chest AP Portable (Final result)  Result time 07/08/19 10:30:53    Final result by Gera Marroquin MD (07/08/19 10:30:53)                 Impression:      No significant interval change.      Electronically signed by: Gera Marroquin  Date:    07/08/2019  Time:    10:30             Narrative:    EXAMINATION:  XR CHEST AP PORTABLE    CLINICAL HISTORY:  Chest Pain;    TECHNIQUE:  Single frontal view of the chest was performed.    COMPARISON:  Chest PA and lateral dated 04/02/2019    FINDINGS:  Stable postsurgical changes.  Similar mild increased interstitial attenuation without lobar consolidation, pleural effusion, or pneumothorax.  Cardiomediastinal silhouette is within normal limits.  Remote left inferolateral 10th rib fracture with healing.

## 2019-07-08 NOTE — ASSESSMENT & PLAN NOTE
SBP >200 on admission  Agree with UDS  Anticipate Initiation of antihypertensives and would prefer BB + ACEI given CAD history     Would not be opposed to brain imaging given near syncopal episode
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
## 2019-07-09 ENCOUNTER — CLINICAL SUPPORT (OUTPATIENT)
Dept: SMOKING CESSATION | Facility: CLINIC | Age: 59
End: 2019-07-09
Payer: COMMERCIAL

## 2019-07-09 VITALS
SYSTOLIC BLOOD PRESSURE: 122 MMHG | DIASTOLIC BLOOD PRESSURE: 74 MMHG | OXYGEN SATURATION: 96 % | BODY MASS INDEX: 22.38 KG/M2 | HEART RATE: 60 BPM | RESPIRATION RATE: 17 BRPM | TEMPERATURE: 97 F | WEIGHT: 165 LBS

## 2019-07-09 DIAGNOSIS — F17.210 CIGARETTE SMOKER: Primary | ICD-10-CM

## 2019-07-09 LAB
ALBUMIN SERPL BCP-MCNC: 3.5 G/DL (ref 3.5–5.2)
ALP SERPL-CCNC: 96 U/L (ref 55–135)
ALT SERPL W/O P-5'-P-CCNC: 34 U/L (ref 10–44)
ANION GAP SERPL CALC-SCNC: 8 MMOL/L (ref 8–16)
AST SERPL-CCNC: 31 U/L (ref 10–40)
BASOPHILS # BLD AUTO: 0.02 K/UL (ref 0–0.2)
BASOPHILS NFR BLD: 0.3 % (ref 0–1.9)
BILIRUB SERPL-MCNC: 0.9 MG/DL (ref 0.1–1)
BUN SERPL-MCNC: 13 MG/DL (ref 6–20)
CALCIUM SERPL-MCNC: 9 MG/DL (ref 8.7–10.5)
CHLORIDE SERPL-SCNC: 105 MMOL/L (ref 95–110)
CO2 SERPL-SCNC: 25 MMOL/L (ref 23–29)
CREAT SERPL-MCNC: 0.8 MG/DL (ref 0.5–1.4)
DIFFERENTIAL METHOD: ABNORMAL
EOSINOPHIL # BLD AUTO: 0.1 K/UL (ref 0–0.5)
EOSINOPHIL NFR BLD: 1.7 % (ref 0–8)
ERYTHROCYTE [DISTWIDTH] IN BLOOD BY AUTOMATED COUNT: 13.2 % (ref 11.5–14.5)
EST. GFR  (AFRICAN AMERICAN): >60 ML/MIN/1.73 M^2
EST. GFR  (NON AFRICAN AMERICAN): >60 ML/MIN/1.73 M^2
ESTIMATED AVG GLUCOSE: 108 MG/DL (ref 68–131)
GLUCOSE SERPL-MCNC: 97 MG/DL (ref 70–110)
HBA1C MFR BLD HPLC: 5.4 % (ref 4–5.6)
HCT VFR BLD AUTO: 45.8 % (ref 40–54)
HGB BLD-MCNC: 15.3 G/DL (ref 14–18)
LYMPHOCYTES # BLD AUTO: 2.2 K/UL (ref 1–4.8)
LYMPHOCYTES NFR BLD: 34 % (ref 18–48)
MAGNESIUM SERPL-MCNC: 2.2 MG/DL (ref 1.6–2.6)
MCH RBC QN AUTO: 31.8 PG (ref 27–31)
MCHC RBC AUTO-ENTMCNC: 33.4 G/DL (ref 32–36)
MCV RBC AUTO: 95 FL (ref 82–98)
MONOCYTES # BLD AUTO: 0.5 K/UL (ref 0.3–1)
MONOCYTES NFR BLD: 7.4 % (ref 4–15)
NEUTROPHILS # BLD AUTO: 3.7 K/UL (ref 1.8–7.7)
NEUTROPHILS NFR BLD: 56.6 % (ref 38–73)
PHOSPHATE SERPL-MCNC: 3.6 MG/DL (ref 2.7–4.5)
PLATELET # BLD AUTO: 206 K/UL (ref 150–350)
PMV BLD AUTO: 9.9 FL (ref 9.2–12.9)
POTASSIUM SERPL-SCNC: 3.9 MMOL/L (ref 3.5–5.1)
PROT SERPL-MCNC: 6.9 G/DL (ref 6–8.4)
RBC # BLD AUTO: 4.81 M/UL (ref 4.6–6.2)
SODIUM SERPL-SCNC: 138 MMOL/L (ref 136–145)
TROPONIN I SERPL DL<=0.01 NG/ML-MCNC: <0.006 NG/ML (ref 0–0.03)
TSH SERPL DL<=0.005 MIU/L-ACNC: 2.18 UIU/ML (ref 0.4–4)
WBC # BLD AUTO: 6.45 K/UL (ref 3.9–12.7)

## 2019-07-09 PROCEDURE — 99999 PR PBB SHADOW E&M-EST. PATIENT-LVL I: ICD-10-PCS | Mod: PBBFAC,,,

## 2019-07-09 PROCEDURE — 36415 COLL VENOUS BLD VENIPUNCTURE: CPT

## 2019-07-09 PROCEDURE — 99407 BEHAV CHNG SMOKING > 10 MIN: CPT | Mod: S$GLB,,,

## 2019-07-09 PROCEDURE — G0378 HOSPITAL OBSERVATION PER HR: HCPCS

## 2019-07-09 PROCEDURE — 80053 COMPREHEN METABOLIC PANEL: CPT

## 2019-07-09 PROCEDURE — 94761 N-INVAS EAR/PLS OXIMETRY MLT: CPT

## 2019-07-09 PROCEDURE — 84443 ASSAY THYROID STIM HORMONE: CPT

## 2019-07-09 PROCEDURE — 85025 COMPLETE CBC W/AUTO DIFF WBC: CPT

## 2019-07-09 PROCEDURE — 99407 PR TOBACCO USE CESSATION INTENSIVE >10 MINUTES: ICD-10-PCS | Mod: S$GLB,,,

## 2019-07-09 PROCEDURE — 84100 ASSAY OF PHOSPHORUS: CPT

## 2019-07-09 PROCEDURE — 25000003 PHARM REV CODE 250: Performed by: STUDENT IN AN ORGANIZED HEALTH CARE EDUCATION/TRAINING PROGRAM

## 2019-07-09 PROCEDURE — 83036 HEMOGLOBIN GLYCOSYLATED A1C: CPT

## 2019-07-09 PROCEDURE — 99999 PR PBB SHADOW E&M-EST. PATIENT-LVL I: CPT | Mod: PBBFAC,,,

## 2019-07-09 PROCEDURE — 83735 ASSAY OF MAGNESIUM: CPT

## 2019-07-09 RX ORDER — LISINOPRIL 10 MG/1
10 TABLET ORAL DAILY
Qty: 90 TABLET | Refills: 3 | Status: ON HOLD | OUTPATIENT
Start: 2019-07-10 | End: 2023-09-27

## 2019-07-09 RX ORDER — LISINOPRIL 5 MG/1
10 TABLET ORAL DAILY
Status: DISCONTINUED | OUTPATIENT
Start: 2019-07-09 | End: 2019-07-09 | Stop reason: HOSPADM

## 2019-07-09 RX ADMIN — LISINOPRIL 10 MG: 5 TABLET ORAL at 08:07

## 2019-07-09 RX ADMIN — Medication 100 MG: at 08:07

## 2019-07-09 RX ADMIN — ASPIRIN 81 MG 81 MG: 81 TABLET ORAL at 08:07

## 2019-07-09 RX ADMIN — CLOPIDOGREL BISULFATE 75 MG: 75 TABLET ORAL at 08:07

## 2019-07-09 RX ADMIN — SERTRALINE HYDROCHLORIDE 25 MG: 25 TABLET ORAL at 08:07

## 2019-07-09 RX ADMIN — PANTOPRAZOLE SODIUM 40 MG: 40 TABLET, DELAYED RELEASE ORAL at 08:07

## 2019-07-09 RX ADMIN — EZETIMIBE 10 MG: 10 TABLET ORAL at 08:07

## 2019-07-09 NOTE — PLAN OF CARE
"VN note: VN cued into patient's room. No family at bedside. VN reviewed discharge information with him. Patient educated on new medication and side effects. Medication list reviewed. I also verified with Dr. Banuelos that patient will continue to take his "Ask" medications. Patient eager for discharge home. I confirmed his phone number and PCP, to notify CM to schedule follow-up. VN educated patient on high blood pressure and importance of keeping a log. He was also notified when to seek medical attention. Low salt diet reinforced. Patient verbalized understanding and all questions answered. Transport requested.    Confirmed prescription delivered at bedside.  "

## 2019-07-09 NOTE — PLAN OF CARE
Problem: Adult Inpatient Plan of Care  Goal: Plan of Care Review  Outcome: Ongoing (interventions implemented as appropriate)  Patient awake, alert, and oriented x 4. Patient safety maintained. Scheduled medications administered per MD order. No complaints of pain or nausea. No distress noted. Slept well throughout the night. Will continue to monitor.

## 2019-07-09 NOTE — ASSESSMENT & PLAN NOTE
BP on admission was 200/90s  Currently 130/80s after Nitro and Alprazolam.   BP goal < 140/80  Started on Lisinopril 10mg PO  Continue to monitor

## 2019-07-09 NOTE — PLAN OF CARE
Discharge planning brochure provided. White board updated with CM name & contact info.  Pt encouraged to call with any questions or needs. CM will continue to follow patient throughout the transitions of care, and assist with any discharge needs.       07/09/19 1341   Discharge Assessment   Assessment Type Discharge Planning Assessment   Confirmed/corrected address and phone number on facesheet? Yes   Assessment information obtained from? Patient   Expected Length of Stay (days) 2   Communicated expected length of stay with patient/caregiver yes   Prior to hospitilization cognitive status: Alert/Oriented   Prior to hospitalization functional status: Independent   Current cognitive status: Alert/Oriented   Current Functional Status: Independent   Lives With child(kelly), dependent   Able to Return to Prior Arrangements yes   Is patient able to care for self after discharge? Yes   Readmission Within the Last 30 Days no previous admission in last 30 days   Patient currently being followed by outpatient case management? No   Patient currently receives any other outside agency services? No   Equipment Currently Used at Home none   Do you have any problems affording any of your prescribed medications? No   Is the patient taking medications as prescribed? yes   Does the patient have transportation home? Yes   Transportation Anticipated family or friend will provide   Does the patient receive services at the [facility]? No   Discharge Plan A Home   Discharge Plan B Home with family   DME Needed Upon Discharge  none   Patient/Family in Agreement with Plan yes       Tisha Sandy RN    [phone]

## 2019-07-09 NOTE — PLAN OF CARE
Future Appointments   Date Time Provider Department Center   7/16/2019  9:30 AM Lilian Blsis PA-C [facility]       Message left for pt, informed of f/u appt as his PCP is not available.  Call back number left as well.  Pt had no other dc needs, independent with ADL's, no HH or DME needed.     07/09/19 1344   Final Note   Assessment Type Final Discharge Note   Anticipated Discharge Disposition Home   Hospital Follow Up  Appt(s) scheduled? Yes   Discharge plans and expectations educations in teach back method with documentation complete? Yes   Right Care Referral Info   Post Acute Recommendation No Care       Tisha Sandy, RN    [phone]

## 2019-07-09 NOTE — PROGRESS NOTES
[facility] Medicine  Progress Note    Patient Name: Mina Jo  MRN: [id number]  Patient Class: OP- Observation   Admission Date: 7/8/2019  Length of Stay: 0 days  Attending Physician: Gabriel Marlow MD  Primary Care Provider: Med Bowie MD        Subjective:     Principal Problem:ACS (acute coronary syndrome)      HPI:  58 yo male with history of CABG in 2009, TIA, CEA in 2019, alcohol abuse presenting with a 2 week history of fatigue and SOB on exertion. Patient was sob walking from his car and the store. SOB improved with 20 minutes of rest. Patient reports that the sensation of fatigue was similar to the presentation to prior to his CABG. Yesterday, patient had chest pressure that radiated to his left jaw and left arm. Patient did not have time to come to the hospital at that time. This morning, patient reported a near syncopal episode with diaphoresis and lightheadedness. Patient reports compliance with all of his medications besides Aspirin.     Of note, patient had a left rib fracture 3 months ago and this pain is not the same.     In the ED, patient's blood pressure was 200s/90s. Patient received Nitroglycerin ointment and Alprazolam. Patient blood pressure improved to 130/80s.    Patient reports drinking 6pack of 16 ounces of beer daily since 18 years old. Patient denies a history of DTs but has not stopped drinking. Patient had a remote history of cocaine use but denies current use. Smoke 1 pack of cigarette/day for 41 years.     Overview/Hospital Course:  No notes on file    Interval History: Patient reports that chest pain and jaw aching improved. Patient was seen by Cardiology yesterday and stress test was negative for ischemia. Patient is currently NPO pending Cardiology evaluation.     Review of Systems   Constitutional: Negative for activity change, appetite change, diaphoresis, fatigue, fever and unexpected weight change.   HENT: Negative for congestion  and sore throat.    Respiratory: Negative for cough, choking, chest tightness, shortness of breath and wheezing.    Cardiovascular: Negative for chest pain, palpitations and leg swelling.   Gastrointestinal: Negative for abdominal pain, constipation, diarrhea, nausea and vomiting.   Endocrine: Negative for polydipsia, polyphagia and polyuria.   Genitourinary: Negative for difficulty urinating and dysuria.   Musculoskeletal: Negative for gait problem and myalgias.   Skin: Negative for pallor, rash and wound.   Neurological: Negative for dizziness, tremors, seizures, syncope and light-headedness.     Objective:     Vital Signs (Most Recent):  Temp: 96.7 °F (35.9 °C) (07/09/19 0744)  Pulse: 64 (07/09/19 0804)  Resp: 17 (07/09/19 0744)  BP: 122/74 (07/09/19 0744)  SpO2: 98 % (07/09/19 0631) Vital Signs (24h Range):  Temp:  [96 °F (35.6 °C)-98.6 °F (37 °C)] 96.7 °F (35.9 °C)  Pulse:  [57-92] 64  Resp:  [10-24] 17  SpO2:  [93 %-98 %] 98 %  BP: (115-202)/(61-94) 122/74     Weight: 74.8 kg (165 lb)  Body mass index is 22.38 kg/m².    Intake/Output Summary (Last 24 hours) at 7/9/2019 0917  Last data filed at 7/9/2019 0600  Gross per 24 hour   Intake 445 ml   Output 525 ml   Net -80 ml      Physical Exam   Constitutional: He is oriented to person, place, and time. He appears well-developed and well-nourished. No distress.   HENT:   Head: Normocephalic and atraumatic.   Eyes: Conjunctivae and EOM are normal. Right eye exhibits no discharge. Left eye exhibits no discharge.   Neck: Normal range of motion. Neck supple. No tracheal deviation present. No thyromegaly present.   Cardiovascular: Normal rate, regular rhythm, normal heart sounds and intact distal pulses. Exam reveals no gallop and no friction rub.   No murmur heard.  Well-healed incision scar in the mid-chest   Pulmonary/Chest: Effort normal and breath sounds normal. No stridor. No respiratory distress. He has no wheezes.   Abdominal: Soft. Bowel sounds are normal. He  exhibits no distension. There is no tenderness. There is no guarding.   Musculoskeletal: Normal range of motion. He exhibits no edema, tenderness or deformity.   Neurological: He is alert and oriented to person, place, and time. No cranial nerve deficit. Coordination normal.   Not tremulous on exam   Skin: Skin is warm and dry. Capillary refill takes less than 2 seconds. He is not diaphoretic. No erythema.       Significant Labs:   Recent Lab Results       07/09/19  0459   07/08/19  1729   07/08/19  1513   07/08/19  1356   07/08/19  1008        Systolic blood pressure     135.0         Diastolic blood pressure     83.0         Albumin 3.5       3.9     Alkaline Phosphatase 96       100     ALT 34       36     Anion Gap 8       12     AST 31       33  Comment:  Specimen slightly hemolyzed     Baso # 0.02       0.03     Basophil% 0.3       0.4     BILIRUBIN TOTAL 0.9  Comment:  For infants and newborns, interpretation of results should be based  on gestational age, weight and in agreement with clinical  observations.  Premature Infant recommended reference ranges:  Up to 24 hours.............<8.0 mg/dL  Up to 48 hours............<12.0 mg/dL  3-5 days..................<15.0 mg/dL  6-29 days.................<15.0 mg/dL         0.5  Comment:  For infants and newborns, interpretation of results should be based  on gestational age, weight and in agreement with clinical  observations.  Premature Infant recommended reference ranges:  Up to 24 hours.............<8.0 mg/dL  Up to 48 hours............<12.0 mg/dL  3-5 days..................<15.0 mg/dL  6-29 days.................<15.0 mg/dL       BNP         29  Comment:  Values of less than 100 pg/ml are consistent with non-CHF populations.     BUN, Bld 13       12     Calcium 9.0       9.4     Chloride 105       105     CO2 25       21     Creatinine 0.8       1.0     HR at rest     89.0         Differential Method Automated       Automated     Exercise duration (min)     5          Exercise duration (sec)     17         Target HR     136.85         eGFR if  >60       >60     eGFR if non  >60  Comment:  Calculation used to obtain the estimated glomerular filtration  rate (eGFR) is the CKD-EPI equation.          >60  Comment:  Calculation used to obtain the estimated glomerular filtration  rate (eGFR) is the CKD-EPI equation.        Eos # 0.1       0.1     Eosinophil% 1.7       0.8     Estimated Avg Glucose 108             Glucose 97       163     Gran # (ANC) 3.7       5.2     Gran% 56.6       66.0     Hematocrit 45.8       45.6     Hemoglobin 15.3       15.7     Hemoglobin A1C External 5.4  Comment:  ADA Screening Guidelines:  5.7-6.4%  Consistent with prediabetes  >or=6.5%  Consistent with diabetes  High levels of fetal hemoglobin interfere with the HbA1C  assay. Heterozygous hemoglobin variants (HbS, HgC, etc)do  not significantly interfere with this assay.   However, presence of multiple variants may affect accuracy.               Lymph # 2.2       2.2     Lymph% 34.0       27.5     Magnesium 2.2             MCH 31.8       32.7     MCHC 33.4       34.4     MCV 95       95     Mono # 0.5       0.4     Mono% 7.4       5.3     MPV 9.9       9.6     1 Minute Recovery HR     107.0         85% Max Predicted HR     137         Estimated METs     7.0         Max Predicted HR     161         [facility] CV CPX PATIENT IS FEMALE     0         [facility] CV CPX PATIENT IS MALE     1         Peak Diatolic BP     84.0         Peak HR     139.0         Peak RPP     30,163         Peak Systolic BP     217.0         % Max HR Achieved     86         RPP     12,015         Phosphorus 3.6             Platelets 206       218     Potassium 3.9       4.0     PROTEIN TOTAL 6.9       7.6     RBC 4.81       4.80     RDW 13.2       13.3     Sodium 138       138     Troponin I   <0.006  Comment:  The reference interval for Troponin I represents the 99th percentile   cutoff   for our facility and  is consistent with 3rd generation assay   performance.     <0.006  Comment:  The reference interval for Troponin I represents the 99th percentile   cutoff   for our facility and is consistent with 3rd generation assay   performance.   <0.006  Comment:  The reference interval for Troponin I represents the 99th percentile   cutoff   for our facility and is consistent with 3rd generation assay   performance.       TSH 2.178             WBC 6.45       7.93                          Significant Imaging:   Imaging Results          NM Myocardial Perfusion Spect Multi Exer (Final result)  Result time 07/08/19 16:49:56    Final result by Sanford Skinner II, MD (07/08/19 16:49:56)                 Impression:      1. No convincing scintigraphic evidence of ischemia or infarct.  2. No definite wall motion abnormalities.  3. Left ventricular ejection fraction 57% during stress and 60% during rest.      Electronically signed by: Sanford Skinner  Date:    07/08/2019  Time:    16:49             Narrative:    EXAMINATION:  NM MYOCARDIAL PERFUSION SPECT MULTI STUDY    CLINICAL HISTORY:  Chest pain, acute, nonspecific, low prob CAD;    TECHNIQUE:  SPECT images through the heart were acquired following the intravenous administration of 12 mCi Tc-99m tetrofosmin at rest. SPECT images were then acquired following the intravenous administration of 27 mCi Tc-99m tetrofosmin during a graded exercise cardiac stress. The clinical stress and ECG portion of the study will be interpreted separately.    COMPARISON:  None available    FINDINGS:  No definite wall motion abnormalities. No stress-induced perfusion defects to suggest ischemia. No fixed defects to suggest infarct.    Stress and rest end diastolic volumes 65 and 77 mL, respectively. Stress and rest end systolic volumes 28 and 25 mL, respectively. Estimated left ventricular ejection fraction 57% during stress and 68% during rest.                               X-Ray Chest AP Portable  (Final result)  Result time 19 10:30:53    Final result by Gera Marroquin MD (19 10:30:53)                 Impression:      No significant interval change.      Electronically signed by: Gera Marroquin  Date:    2019  Time:    10:30             Narrative:    EXAMINATION:  XR CHEST AP PORTABLE    CLINICAL HISTORY:  Chest Pain;    TECHNIQUE:  Single frontal view of the chest was performed.    COMPARISON:  Chest PA and lateral dated 2019    FINDINGS:  Stable postsurgical changes.  Similar mild increased interstitial attenuation without lobar consolidation, pleural effusion, or pneumothorax.  Cardiomediastinal silhouette is within normal limits.  Remote left inferolateral 10th rib fracture with healing.                                    Assessment/Plan:      * ACS (acute coronary syndrome)  Reports a history of chest pressure that radiates to the left jaw and arm  Hx of CABG in   Trop and EKG were negative x 3  Stress test: Negative for Ischemia  Consulted [facility], Appreciate recs  On telemetry    Coronary artery disease involving native coronary artery  Hx of CABG in   Continue Aspirin and Plavix      Essential hypertension  BP on admission was 200/90s  Currently 130/80s after Nitro and Alprazolam.   BP goal < 140/80  Started on Lisinopril 10mg PO  Continue to monitor      Gastroesophageal reflux disease without esophagitis  Continue home medications      Hx of CABG  Hx of CABG in   Continue Aspirin and Plavix  Consulted Cardiology    Combined hyperlipidemia  3/22/19 Lipid Panel: Chol: 197, Tri, HDL: 47, LDL: 105  Continue current medications        Bilateral carotid artery stenosis  Hx of bilateral carotid artery stenosis s/p CEA in   Stable      Alcohol abuse  Drinks 6, 16oz of beer daily  No hx of DTs  Thiamine daily  Ativan PRN for CIWA > 8        VTE Risk Mitigation (From admission, onward)        Ordered     IP VTE LOW RISK PATIENT  Once       07/08/19 1149     Place sequential compression device  Until discontinued      07/08/19 1149          Jailene Banuelos, PGY-2  [facility]  07/09/2019 9:23 AM

## 2019-07-09 NOTE — ASSESSMENT & PLAN NOTE
Reports a history of chest pressure that radiates to the left jaw and arm  Hx of CABG in 2009  Trop and EKG were negative x 3  Stress test: Negative for Ischemia  Consulted [facility], Appreciate recs  On telemetry

## 2019-07-09 NOTE — DISCHARGE INSTRUCTIONS
High Blood Pressure (Hypertension), Discharge Instructions (English) View Edit Remove   High Blood Pressure, What Is? (English) View Edit Remove   Chest Pain, Uncertain Cause (English) View Edit Remove   Lisinopril tablets (English) View Edit Remove   Smoking Cessation (English) View Edit Remove     Diet, Low-Salt (English) View Edit Remove

## 2019-07-09 NOTE — PROGRESS NOTES
Smoking cessation education provided. Pt denies nicotine withdrawal symptoms.  He states that he has been smoking since the age of 18, and that he smokes 1 pack of cigarettes per day on average. . Pt is currently enrolled in the [employer]. Ambulatory referral to Smoking Cessation program following hospital discharge.

## 2019-07-09 NOTE — SUBJECTIVE & OBJECTIVE
Interval History: Patient reports that chest pain and jaw aching improved. Patient was seen by Cardiology yesterday and stress test was negative for ischemia. Patient is currently NPO pending Cardiology evaluation.     Review of Systems   Constitutional: Negative for activity change, appetite change, diaphoresis, fatigue, fever and unexpected weight change.   HENT: Negative for congestion and sore throat.    Respiratory: Negative for cough, choking, chest tightness, shortness of breath and wheezing.    Cardiovascular: Negative for chest pain, palpitations and leg swelling.   Gastrointestinal: Negative for abdominal pain, constipation, diarrhea, nausea and vomiting.   Endocrine: Negative for polydipsia, polyphagia and polyuria.   Genitourinary: Negative for difficulty urinating and dysuria.   Musculoskeletal: Negative for gait problem and myalgias.   Skin: Negative for pallor, rash and wound.   Neurological: Negative for dizziness, tremors, seizures, syncope and light-headedness.     Objective:     Vital Signs (Most Recent):  Temp: 96.7 °F (35.9 °C) (07/09/19 0744)  Pulse: 64 (07/09/19 0804)  Resp: 17 (07/09/19 0744)  BP: 122/74 (07/09/19 0744)  SpO2: 98 % (07/09/19 0631) Vital Signs (24h Range):  Temp:  [96 °F (35.6 °C)-98.6 °F (37 °C)] 96.7 °F (35.9 °C)  Pulse:  [57-92] 64  Resp:  [10-24] 17  SpO2:  [93 %-98 %] 98 %  BP: (115-202)/(61-94) 122/74     Weight: 74.8 kg (165 lb)  Body mass index is 22.38 kg/m².    Intake/Output Summary (Last 24 hours) at 7/9/2019 0917  Last data filed at 7/9/2019 0600  Gross per 24 hour   Intake 445 ml   Output 525 ml   Net -80 ml      Physical Exam   Constitutional: He is oriented to person, place, and time. He appears well-developed and well-nourished. No distress.   HENT:   Head: Normocephalic and atraumatic.   Eyes: Conjunctivae and EOM are normal. Right eye exhibits no discharge. Left eye exhibits no discharge.   Neck: Normal range of motion. Neck supple. No tracheal deviation  present. No thyromegaly present.   Cardiovascular: Normal rate, regular rhythm, normal heart sounds and intact distal pulses. Exam reveals no gallop and no friction rub.   No murmur heard.  Well-healed incision scar in the mid-chest   Pulmonary/Chest: Effort normal and breath sounds normal. No stridor. No respiratory distress. He has no wheezes.   Abdominal: Soft. Bowel sounds are normal. He exhibits no distension. There is no tenderness. There is no guarding.   Musculoskeletal: Normal range of motion. He exhibits no edema, tenderness or deformity.   Neurological: He is alert and oriented to person, place, and time. No cranial nerve deficit. Coordination normal.   Not tremulous on exam   Skin: Skin is warm and dry. Capillary refill takes less than 2 seconds. He is not diaphoretic. No erythema.       Significant Labs:   Recent Lab Results       07/09/19  0459   07/08/19  1729   07/08/19  1513   07/08/19  1356   07/08/19  1008        Systolic blood pressure     135.0         Diastolic blood pressure     83.0         Albumin 3.5       3.9     Alkaline Phosphatase 96       100     ALT 34       36     Anion Gap 8       12     AST 31       33  Comment:  Specimen slightly hemolyzed     Baso # 0.02       0.03     Basophil% 0.3       0.4     BILIRUBIN TOTAL 0.9  Comment:  For infants and newborns, interpretation of results should be based  on gestational age, weight and in agreement with clinical  observations.  Premature Infant recommended reference ranges:  Up to 24 hours.............<8.0 mg/dL  Up to 48 hours............<12.0 mg/dL  3-5 days..................<15.0 mg/dL  6-29 days.................<15.0 mg/dL         0.5  Comment:  For infants and newborns, interpretation of results should be based  on gestational age, weight and in agreement with clinical  observations.  Premature Infant recommended reference ranges:  Up to 24 hours.............<8.0 mg/dL  Up to 48 hours............<12.0 mg/dL  3-5  days..................<15.0 mg/dL  6-29 days.................<15.0 mg/dL       BNP         29  Comment:  Values of less than 100 pg/ml are consistent with non-CHF populations.     BUN, Bld 13       12     Calcium 9.0       9.4     Chloride 105       105     CO2 25       21     Creatinine 0.8       1.0     HR at rest     89.0         Differential Method Automated       Automated     Exercise duration (min)     5         Exercise duration (sec)     17         Target HR     136.85         eGFR if  >60       >60     eGFR if non  >60  Comment:  Calculation used to obtain the estimated glomerular filtration  rate (eGFR) is the CKD-EPI equation.          >60  Comment:  Calculation used to obtain the estimated glomerular filtration  rate (eGFR) is the CKD-EPI equation.        Eos # 0.1       0.1     Eosinophil% 1.7       0.8     Estimated Avg Glucose 108             Glucose 97       163     Gran # (ANC) 3.7       5.2     Gran% 56.6       66.0     Hematocrit 45.8       45.6     Hemoglobin 15.3       15.7     Hemoglobin A1C External 5.4  Comment:  ADA Screening Guidelines:  5.7-6.4%  Consistent with prediabetes  >or=6.5%  Consistent with diabetes  High levels of fetal hemoglobin interfere with the HbA1C  assay. Heterozygous hemoglobin variants (HbS, HgC, etc)do  not significantly interfere with this assay.   However, presence of multiple variants may affect accuracy.               Lymph # 2.2       2.2     Lymph% 34.0       27.5     Magnesium 2.2             MCH 31.8       32.7     MCHC 33.4       34.4     MCV 95       95     Mono # 0.5       0.4     Mono% 7.4       5.3     MPV 9.9       9.6     1 Minute Recovery HR     107.0         85% Max Predicted HR     137         Estimated METs     7.0         Max Predicted HR     161         [facility] CV CPX PATIENT IS FEMALE     0         [facility] CV CPX PATIENT IS MALE     1         Peak Diatolic BP     84.0         Peak HR     139.0         Peak RPP     30,163          Peak Systolic BP     217.0         % Max HR Achieved     86         RPP     12,015         Phosphorus 3.6             Platelets 206       218     Potassium 3.9       4.0     PROTEIN TOTAL 6.9       7.6     RBC 4.81       4.80     RDW 13.2       13.3     Sodium 138       138     Troponin I   <0.006  Comment:  The reference interval for Troponin I represents the 99th percentile   cutoff   for our facility and is consistent with 3rd generation assay   performance.     <0.006  Comment:  The reference interval for Troponin I represents the 99th percentile   cutoff   for our facility and is consistent with 3rd generation assay   performance.   <0.006  Comment:  The reference interval for Troponin I represents the 99th percentile   cutoff   for our facility and is consistent with 3rd generation assay   performance.       TSH 2.178             WBC 6.45       7.93                          Significant Imaging:   Imaging Results          NM Myocardial Perfusion Spect Multi Exer (Final result)  Result time 07/08/19 16:49:56    Final result by Sanford Skinner II, MD (07/08/19 16:49:56)                 Impression:      1. No convincing scintigraphic evidence of ischemia or infarct.  2. No definite wall motion abnormalities.  3. Left ventricular ejection fraction 57% during stress and 60% during rest.      Electronically signed by: Sanford Skinner  Date:    07/08/2019  Time:    16:49             Narrative:    EXAMINATION:  NM MYOCARDIAL PERFUSION SPECT MULTI STUDY    CLINICAL HISTORY:  Chest pain, acute, nonspecific, low prob CAD;    TECHNIQUE:  SPECT images through the heart were acquired following the intravenous administration of 12 mCi Tc-99m tetrofosmin at rest. SPECT images were then acquired following the intravenous administration of 27 mCi Tc-99m tetrofosmin during a graded exercise cardiac stress. The clinical stress and ECG portion of the study will be interpreted separately.    COMPARISON:  None  available    FINDINGS:  No definite wall motion abnormalities. No stress-induced perfusion defects to suggest ischemia. No fixed defects to suggest infarct.    Stress and rest end diastolic volumes 65 and 77 mL, respectively. Stress and rest end systolic volumes 28 and 25 mL, respectively. Estimated left ventricular ejection fraction 57% during stress and 68% during rest.                               X-Ray Chest AP Portable (Final result)  Result time 07/08/19 10:30:53    Final result by Gera Marroquin MD (07/08/19 10:30:53)                 Impression:      No significant interval change.      Electronically signed by: Gera Marroquin  Date:    07/08/2019  Time:    10:30             Narrative:    EXAMINATION:  XR CHEST AP PORTABLE    CLINICAL HISTORY:  Chest Pain;    TECHNIQUE:  Single frontal view of the chest was performed.    COMPARISON:  Chest PA and lateral dated 04/02/2019    FINDINGS:  Stable postsurgical changes.  Similar mild increased interstitial attenuation without lobar consolidation, pleural effusion, or pneumothorax.  Cardiomediastinal silhouette is within normal limits.  Remote left inferolateral 10th rib fracture with healing.

## 2019-07-15 NOTE — ASSESSMENT & PLAN NOTE
Patient presented with a history of chest pressure that radiates to the left jaw and arm  Trop and EKG were negative x 3  Stress test: Negative for Ischemia  Consulted [facility] who cleared patient for discharge  On telemetry

## 2019-07-15 NOTE — ASSESSMENT & PLAN NOTE
BP on admission was 200/90s  130/80s after Nitro and Alprazolam in the ED.   Lisinopril 10mg PO was started and patient remained at blood pressure goal

## 2019-07-15 NOTE — DISCHARGE SUMMARY
[facility] Hospital Medicine  Discharge Summary      Patient Name: Mina Jo  MRN: [id number]  Admission Date: 7/8/2019  Hospital Length of Stay: 0 days  Discharge Date and Time: 7/9/2019  1:24 PM  Attending Physician: No att. providers found   Discharging Provider: Jailene Banuelos MD  Primary Care Provider: Med Bowie MD      HPI:   60 yo male with history of CABG in 2009, TIA, CEA in 2019, alcohol abuse presenting with a 2 week history of fatigue and SOB on exertion. Patient was sob walking from his car and the store. SOB improved with 20 minutes of rest. Patient reports that the sensation of fatigue was similar to the presentation to prior to his CABG. Yesterday, patient had chest pressure that radiated to his left jaw and left arm. Patient did not have time to come to the hospital at that time. This morning, patient reported a near syncopal episode with diaphoresis and lightheadedness. Patient reports compliance with all of his medications besides Aspirin.     Of note, patient had a left rib fracture 3 months ago and this pain is not the same.     In the ED, patient's blood pressure was 200s/90s. Patient received Nitroglycerin ointment and Alprazolam. Patient blood pressure improved to 130/80s.    Patient reports drinking 6pack of 16 ounces of beer daily since 18 years old. Patient denies a history of DTs but has not stopped drinking. Patient had a remote history of cocaine use but denies current use. Smoke 1 pack of cigarette/day for 41 years.     * No surgery found *      Hospital Course:   Patient was admitted for ACS rule out. Troponin and EKG were negative x 3. Cardiology was consulted who ordered a Stress ECHO which was negative for ischemia. Cardiology cleared patient for discharge home. Patient tolerated diet and with no nausea and vomiting. VSS were stable and patient was stable for discharge with follow up with PCP     Consults:   Consults (From admission, onward)         Status Ordering Provider     Inpatient consult to [facility]  Once     Provider:  (Not yet assigned)    NESSA [facility]ITPAL          * ACS (acute coronary syndrome)  Patient presented with a history of chest pressure that radiates to the left jaw and arm  Trop and EKG were negative x 3  Stress test: Negative for Ischemia  Consulted [facility] who cleared patient for discharge  On telemetry    Coronary artery disease involving native coronary artery  Hx of CABG in 2009  Continue Aspirin and Plavix      Essential hypertension  BP on admission was 200/90s  130/80s after Nitro and Alprazolam in the ED.   Lisinopril 10mg PO was started and patient remained at blood pressure goal    Gastroesophageal reflux disease without esophagitis  Continue home medications      Alcohol abuse  Drinks 6, 16oz of beer daily  No hx of DTs  Thiamine daily  Ativan PRN for CIWA > 8        Final Active Diagnoses:    Diagnosis Date Noted POA    PRINCIPAL PROBLEM:  ACS (acute coronary syndrome) [I24.9] 07/08/2019 Yes    Coronary artery disease involving native coronary artery [I25.10] 09/25/2017 Yes    Essential hypertension [I10] 09/25/2017 Yes    Gastroesophageal reflux disease without esophagitis [K21.9] 07/08/2019 Yes    Hx of CABG [Z95.1] 10/05/2017 Not Applicable    Combined hyperlipidemia [E78.2] 11/03/2017 Yes    Bilateral carotid artery stenosis [I65.23] 11/17/2017 Yes    Alcohol abuse [F10.10] 07/08/2019 Yes      Problems Resolved During this Admission:    Diagnosis Date Noted Date Resolved POA    Hypertension [I10] 07/08/2019 07/08/2019 Yes       Discharged Condition: stable    Disposition: Home or Self Care    Follow Up:  Follow-up Information     Lilian Bliss PA-C On 7/16/2019.    Specialty:  Internal Medicine  Why:  9:30 am , For Hospital Follow-up  Contact information:  [address]  [place] [id number] [phone]                 Patient Instructions:      Diet Cardiac     Notify  your health care provider if you experience any of the following:  temperature >100.4     Notify your health care provider if you experience any of the following:  persistent nausea and vomiting or diarrhea     Notify your health care provider if you experience any of the following:  severe uncontrolled pain     Notify your health care provider if you experience any of the following:  redness, tenderness, or signs of infection (pain, swelling, redness, odor or green/yellow discharge around incision site)     Notify your health care provider if you experience any of the following:  difficulty breathing or increased cough     Notify your health care provider if you experience any of the following:  worsening rash     Notify your health care provider if you experience any of the following:  persistent dizziness, light-headedness, or visual disturbances     Notify your health care provider if you experience any of the following:  increased confusion or weakness     Activity as tolerated       Significant Diagnostic Studies: Labs:   Troponin   Recent Labs   Lab 07/08/19  1729   TROPONINI <0.006       Pending Diagnostic Studies:     None         Medications:  Reconciled Home Medications:      Medication List      START taking these medications    lisinopril 10 MG tablet  Take 1 tablet (10 mg total) by mouth once daily.        CONTINUE taking these medications    aspirin 81 MG Chew  Take 81 mg by mouth once daily.     atorvastatin 80 MG tablet  Commonly known as:  LIPITOR  Take 1 tablet (80 mg total) by mouth every evening.     clopidogrel 75 mg tablet  Commonly known as:  PLAVIX  Take 1 tablet (75 mg total) by mouth once daily.        STOP taking these medications    oxyCODONE-acetaminophen 5-325 mg per tablet  Commonly known as:  PERCOCET        ASK your doctor about these medications    omeprazole 40 MG capsule  Commonly known as:  PriLOSEC  Take 1 capsule (40 mg total) by mouth once daily.     sertraline 25 MG  tablet  Commonly known as:  ZOLOFT  Take 1 tablet (25 mg total) by mouth once daily.            Indwelling Lines/Drains at time of discharge:   Lines/Drains/Airways          None          Time spent on the discharge of patient: > 30 minutes  Patient was seen and examined on the date of discharge and determined to be suitable for discharge.         Jailene Banuelos MD  Department of Hospital Medicine  [facility]

## 2019-07-15 NOTE — HOSPITAL COURSE
Patient was admitted for ACS rule out. Troponin and EKG were negative x 3. Cardiology was consulted who ordered a Stress ECHO which was negative for ischemia. Cardiology cleared patient for discharge home. Patient tolerated diet and with no nausea and vomiting. VSS were stable and patient was stable for discharge with follow up with PCP

## 2019-07-16 ENCOUNTER — OFFICE VISIT (OUTPATIENT)
Dept: INTERNAL MEDICINE | Facility: CLINIC | Age: 59
End: 2019-07-16
Payer: COMMERCIAL

## 2019-07-16 VITALS
WEIGHT: 166 LBS | BODY MASS INDEX: 22.48 KG/M2 | HEIGHT: 72 IN | DIASTOLIC BLOOD PRESSURE: 68 MMHG | SYSTOLIC BLOOD PRESSURE: 102 MMHG | HEART RATE: 89 BPM

## 2019-07-16 DIAGNOSIS — Z72.0 TOBACCO ABUSE: ICD-10-CM

## 2019-07-16 DIAGNOSIS — R07.9 CHEST PAIN, UNSPECIFIED TYPE: Primary | ICD-10-CM

## 2019-07-16 DIAGNOSIS — I10 HYPERTENSION, ESSENTIAL: ICD-10-CM

## 2019-07-16 PROCEDURE — 3008F BODY MASS INDEX DOCD: CPT | Mod: CPTII,S$GLB,, | Performed by: PHYSICIAN ASSISTANT

## 2019-07-16 PROCEDURE — 3008F PR BODY MASS INDEX (BMI) DOCUMENTED: ICD-10-PCS | Mod: CPTII,S$GLB,, | Performed by: PHYSICIAN ASSISTANT

## 2019-07-16 PROCEDURE — 3078F PR MOST RECENT DIASTOLIC BLOOD PRESSURE < 80 MM HG: ICD-10-PCS | Mod: CPTII,S$GLB,, | Performed by: PHYSICIAN ASSISTANT

## 2019-07-16 PROCEDURE — 3074F PR MOST RECENT SYSTOLIC BLOOD PRESSURE < 130 MM HG: ICD-10-PCS | Mod: CPTII,S$GLB,, | Performed by: PHYSICIAN ASSISTANT

## 2019-07-16 PROCEDURE — 99214 PR OFFICE/OUTPT VISIT, EST, LEVL IV, 30-39 MIN: ICD-10-PCS | Mod: S$GLB,,, | Performed by: PHYSICIAN ASSISTANT

## 2019-07-16 PROCEDURE — 3078F DIAST BP <80 MM HG: CPT | Mod: CPTII,S$GLB,, | Performed by: PHYSICIAN ASSISTANT

## 2019-07-16 PROCEDURE — 99999 PR PBB SHADOW E&M-EST. PATIENT-LVL IV: ICD-10-PCS | Mod: PBBFAC,,, | Performed by: PHYSICIAN ASSISTANT

## 2019-07-16 PROCEDURE — 3074F SYST BP LT 130 MM HG: CPT | Mod: CPTII,S$GLB,, | Performed by: PHYSICIAN ASSISTANT

## 2019-07-16 PROCEDURE — 99214 OFFICE O/P EST MOD 30 MIN: CPT | Mod: S$GLB,,, | Performed by: PHYSICIAN ASSISTANT

## 2019-07-16 PROCEDURE — 99999 PR PBB SHADOW E&M-EST. PATIENT-LVL IV: CPT | Mod: PBBFAC,,, | Performed by: PHYSICIAN ASSISTANT

## 2019-07-16 NOTE — PROGRESS NOTES
Subjective:       Patient ID: Mina Jo is a 59 y.o. male.        Chief Complaint: Hospital Follow Up    Mina Jo is an established patient of Med Bowie MD here today for hospital f/u visit.    In ED secondary to presyncope and chest pain.  No recurrence of presyncopal event.  Chest pain has been recurrent and ongoing x 4 weeks.  Occurs daily, substernal and at times across entire chest.  No shortness of breath.  Chest pain induced by exertion and sometimes anxiety.  Sometimes can exert without issue but at other times has chest pain.  Stops, rests, chest pain improves.  Pain radiates to his jaw at times.  Lasts from 10 minutes to an hour.  Occurs daily.  Also in general feels increased fatigue.  Feels similar to sx preceding previous CABG.    Negative stress test, EKG, and troponin.  CXR acceptable.  History of CABG in 2009.      Hypertensive in ED and lisinopril 10 mg added upon discharge.  BP acceptable today.      Working on tobacco cessation with tobacco clinic.    Follows with magdaleno Ibanez appointment 1/2019.           Review of Systems   Constitutional: Positive for fatigue. Negative for appetite change, chills and fever.   HENT: Negative for congestion and sore throat.    Eyes: Negative for visual disturbance.   Respiratory: Negative for cough, chest tightness and shortness of breath.    Cardiovascular: Positive for chest pain. Negative for palpitations and leg swelling.   Gastrointestinal: Negative for abdominal pain, blood in stool, constipation, diarrhea, nausea and vomiting.   Genitourinary: Negative for dysuria, frequency, hematuria and urgency.   Musculoskeletal: Negative for arthralgias and back pain.   Skin: Negative for rash.   Neurological: Negative for dizziness, syncope, weakness and headaches.   Psychiatric/Behavioral: Negative for dysphoric mood and sleep disturbance. The patient is not nervous/anxious.        Objective:      Physical Exam   Constitutional: He  "appears well-developed and well-nourished.   HENT:   Head: Normocephalic.   Right Ear: External ear normal.   Left Ear: External ear normal.   Mouth/Throat: Oropharynx is clear and moist.   Eyes: Pupils are equal, round, and reactive to light.   Cardiovascular: Normal rate, regular rhythm and normal heart sounds. Exam reveals no gallop and no friction rub.   No murmur heard.  Pulmonary/Chest: Effort normal and breath sounds normal. No respiratory distress.   Abdominal: Soft. There is no tenderness.   Musculoskeletal: He exhibits no edema.   Neurological: He is alert.   Skin: Skin is warm and dry.   Psychiatric: He has a normal mood and affect.   Nursing note and vitals reviewed.      Assessment:       1. Chest pain, unspecified type    2. Hypertension, essential    3. Tobacco abuse        Plan:       Mina RICHARDSON was seen today for hospital follow up.    Diagnoses and all orders for this visit:    Chest pain, unspecified type  -     Ambulatory referral to Cardiology    Hypertension, essential: stable and controlled, schedule PCP f/u in 3-4 weeks    Tobacco abuse: working on cessation with tobacco cessation clinic    To see cardiology given chest pain x 4 weeks.  ED prompts reinforced.  F/u with PCP in 3-4 weeks.    Pt has been given instructions populated from [email or web address] database and has verbalized understanding of the after visit summary and information contained wherein.    Follow up with a primary care provider. May go to ER for acute shortness of breath, lightheadedness, fever, or any other emergent complaints or changes in condition.    "This note will be shared with the patient"    Future Appointments   Date Time Provider Department Center   7/19/2019  9:00 AM Pradip Zimmerman MD [facility] CARDIO [place]   7/25/2019  6:30 PM Angeles Medel RN [facility] SMOKE [place]               "

## 2019-07-16 NOTE — PATIENT INSTRUCTIONS

## 2019-07-19 ENCOUNTER — OFFICE VISIT (OUTPATIENT)
Dept: CARDIOLOGY | Facility: CLINIC | Age: 59
End: 2019-07-19
Payer: COMMERCIAL

## 2019-07-19 VITALS
BODY MASS INDEX: 22.46 KG/M2 | DIASTOLIC BLOOD PRESSURE: 74 MMHG | HEIGHT: 72 IN | OXYGEN SATURATION: 96 % | HEART RATE: 92 BPM | SYSTOLIC BLOOD PRESSURE: 121 MMHG | WEIGHT: 165.81 LBS

## 2019-07-19 DIAGNOSIS — E78.2 COMBINED HYPERLIPIDEMIA: ICD-10-CM

## 2019-07-19 DIAGNOSIS — I16.1 HYPERTENSIVE EMERGENCY WITHOUT CONGESTIVE HEART FAILURE: Primary | ICD-10-CM

## 2019-07-19 DIAGNOSIS — I73.9 PAD (PERIPHERAL ARTERY DISEASE): ICD-10-CM

## 2019-07-19 DIAGNOSIS — I63.232 STROKE DUE TO STENOSIS OF LEFT CAROTID ARTERY: ICD-10-CM

## 2019-07-19 DIAGNOSIS — I25.10 CORONARY ARTERY DISEASE INVOLVING NATIVE CORONARY ARTERY OF NATIVE HEART WITHOUT ANGINA PECTORIS: ICD-10-CM

## 2019-07-19 DIAGNOSIS — I10 ESSENTIAL HYPERTENSION: ICD-10-CM

## 2019-07-19 DIAGNOSIS — F17.200 TOBACCO DEPENDENCE: ICD-10-CM

## 2019-07-19 DIAGNOSIS — I65.23 BILATERAL CAROTID ARTERY STENOSIS: ICD-10-CM

## 2019-07-19 DIAGNOSIS — Z95.1 HX OF CABG: ICD-10-CM

## 2019-07-19 DIAGNOSIS — Z98.890 HISTORY OF CAROTID ENDARTERECTOMY: ICD-10-CM

## 2019-07-19 PROBLEM — I24.9 ACS (ACUTE CORONARY SYNDROME): Status: RESOLVED | Noted: 2019-07-08 | Resolved: 2019-07-19

## 2019-07-19 PROCEDURE — 99214 OFFICE O/P EST MOD 30 MIN: CPT | Mod: S$GLB,,, | Performed by: INTERNAL MEDICINE

## 2019-07-19 PROCEDURE — 99999 PR PBB SHADOW E&M-EST. PATIENT-LVL III: ICD-10-PCS | Mod: PBBFAC,,, | Performed by: INTERNAL MEDICINE

## 2019-07-19 PROCEDURE — 99999 PR PBB SHADOW E&M-EST. PATIENT-LVL III: CPT | Mod: PBBFAC,,, | Performed by: INTERNAL MEDICINE

## 2019-07-19 PROCEDURE — 3074F SYST BP LT 130 MM HG: CPT | Mod: CPTII,S$GLB,, | Performed by: INTERNAL MEDICINE

## 2019-07-19 PROCEDURE — 3078F PR MOST RECENT DIASTOLIC BLOOD PRESSURE < 80 MM HG: ICD-10-PCS | Mod: CPTII,S$GLB,, | Performed by: INTERNAL MEDICINE

## 2019-07-19 PROCEDURE — 3078F DIAST BP <80 MM HG: CPT | Mod: CPTII,S$GLB,, | Performed by: INTERNAL MEDICINE

## 2019-07-19 PROCEDURE — 3074F PR MOST RECENT SYSTOLIC BLOOD PRESSURE < 130 MM HG: ICD-10-PCS | Mod: CPTII,S$GLB,, | Performed by: INTERNAL MEDICINE

## 2019-07-19 PROCEDURE — 3008F PR BODY MASS INDEX (BMI) DOCUMENTED: ICD-10-PCS | Mod: CPTII,S$GLB,, | Performed by: INTERNAL MEDICINE

## 2019-07-19 PROCEDURE — 3008F BODY MASS INDEX DOCD: CPT | Mod: CPTII,S$GLB,, | Performed by: INTERNAL MEDICINE

## 2019-07-19 PROCEDURE — 99214 PR OFFICE/OUTPT VISIT, EST, LEVL IV, 30-39 MIN: ICD-10-PCS | Mod: S$GLB,,, | Performed by: INTERNAL MEDICINE

## 2019-07-19 RX ORDER — NITROGLYCERIN 0.4 MG/1
0.4 TABLET SUBLINGUAL EVERY 5 MIN PRN
Qty: 60 TABLET | Refills: 12 | Status: SHIPPED | OUTPATIENT
Start: 2019-07-19

## 2019-07-19 NOTE — PROGRESS NOTES
"Subjective:   Patient ID:  Mina Jo is a 59 y.o. male who presents for follow-up of Chest Pain and Shortness of Breath      HPI:   PT is here s/p ED visit for CP. Patient also had left sided face pain and dizziness with blurry vision.  In the ED, patient's blood pressure was 200s/90s. Patient received Nitroglycerin ointment and Alprazolam. Patient blood pressure improved to 130/80s.  Troponin and EKG were negative x 3. SPECT was negative  "1. No convincing scintigraphic evidence of ischemia or infarct.  2. No definite wall motion abnormalities.  3. Left ventricular ejection fraction 57% during stress and 60% during rest."    I reviewed images and agree.  Perfusion is very normal at rest and stress.    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 At the time of his CVA, he received tPA and has no residual deficits.  He was placed on ASA and plavix after his evaluation.  He was cocaine+ at the time of presentation.  He is followed by Vascular surgery - Dr. Patel and has 60 - 79% right ICA stenosis.  He continues to smoke and is interested in tob cessation classes - about to start in the next week.  He denies any exertional chest discomfort, exertional dyspnea, palpitations, TIA's, syncope or presyncope.  All CP has been somewhat random.       Bilateral renal hilar calcifications are most likely vascular in origin. -CT 2011      Carotid 9-18-18  Impression:   RIGHT SIDE:   60 - 79% right ICA stenosis.   Heterogeneous plaque in the right internal carotid artery.   Antegrade flow in the right vertebral artery.     LEFT SIDE:  Endarterectomized   Antegrade flow in the left vertebral artery.+        Vitals:    07/19/19 0901   BP: 121/74   BP Location: Left arm   Patient Position: Standing   BP Method: Medium (Automatic)   Pulse: 92   SpO2: 96%   Weight: 75.2 kg (165 lb 12.6 oz)   Height: 6' (1.829 m)     Body mass index is 22.48 kg/m².  CrCl cannot be " calculated (Patient's most recent lab result is older than the maximum 7 days allowed.).    Lab Results   Component Value Date     07/09/2019    K 3.9 07/09/2019     07/09/2019    CO2 25 07/09/2019    BUN 13 07/09/2019    CREATININE 0.8 07/09/2019    GLU 97 07/09/2019    HGBA1C 5.4 07/09/2019    MG 2.2 07/09/2019    AST 31 07/09/2019    ALT 34 07/09/2019    ALBUMIN 3.5 07/09/2019    PROT 6.9 07/09/2019    BILITOT 0.9 07/09/2019    WBC 6.45 07/09/2019    HGB 15.3 07/09/2019    HCT 45.8 07/09/2019    HCT 44 09/24/2017    MCV 95 07/09/2019     07/09/2019    INR 0.9 12/24/2018    TSH 2.178 07/09/2019    CHOL 197 03/22/2019    HDL 47 03/22/2019    LDLCALC 105.0 03/22/2019    TRIG 225 (H) 03/22/2019       Current Outpatient Medications   Medication Sig    aspirin 81 MG Chew Take 81 mg by mouth once daily.    atorvastatin (LIPITOR) 80 MG tablet Take 1 tablet (80 mg total) by mouth every evening.    clopidogrel (PLAVIX) 75 mg tablet Take 1 tablet (75 mg total) by mouth once daily.    lisinopril 10 MG tablet Take 1 tablet (10 mg total) by mouth once daily.    omeprazole (PRILOSEC) 40 MG capsule Take 1 capsule (40 mg total) by mouth once daily. (Patient taking differently: Take 40 mg by mouth daily as needed. )    sertraline (ZOLOFT) 25 MG tablet Take 1 tablet (25 mg total) by mouth once daily.    nitroGLYCERIN (NITROSTAT) 0.4 MG SL tablet Place 1 tablet (0.4 mg total) under the tongue every 5 (five) minutes as needed for Chest pain.     No current facility-administered medications for this visit.        Review of Systems   Constitution: Negative for decreased appetite, malaise/fatigue, weight gain and weight loss.   Eyes: Negative for visual disturbance.   Cardiovascular: Negative for chest pain, claudication, dyspnea on exertion, irregular heartbeat, orthopnea, palpitations, paroxysmal nocturnal dyspnea and syncope.   Respiratory: Negative for cough, shortness of breath and snoring.    Skin:  Negative for rash.   Musculoskeletal: Negative for arthritis, muscle cramps, muscle weakness and myalgias.   Gastrointestinal: Negative for abdominal pain, anorexia, change in bowel habit and nausea.   Genitourinary: Negative for dysuria and frequency.   Neurological: Negative for excessive daytime sleepiness, dizziness, headaches, loss of balance, numbness and weakness.   Psychiatric/Behavioral: Negative for depression.       Objective:   Physical Exam   Constitutional: He is oriented to person, place, and time. He appears well-developed and well-nourished.   HENT:   Head: Normocephalic and atraumatic.   Cardiovascular: Normal rate, regular rhythm, normal heart sounds and intact distal pulses. Exam reveals no gallop and no friction rub.   No murmur heard.  Pulses:       Carotid pulses are on the right side with bruit, and on the left side with bruit.  Pulmonary/Chest: Effort normal and breath sounds normal.   Neurological: He is alert and oriented to person, place, and time.   Psychiatric: He has a normal mood and affect. His behavior is normal. Judgment and thought content normal.       Assessment:     1. Hypertensive emergency without congestive heart failure    2. Coronary artery disease involving native coronary artery of native heart without angina pectoris    3. Essential hypertension    4. Hx of CABG    5. History of carotid endarterectomy    6. Stroke due to stenosis of left carotid artery    7. Combined hyperlipidemia    8. Bilateral carotid artery stenosis    9. PAD (peripheral artery disease)    10. Tobacco dependence        Plan:   Pt's CP was likely due to profound HTN.  Given his history, need to r/o CRIS-->renal artery doplers.    Will prescribe SLNTG PRN as this brought down his pressure nicely and abated CP.  If he uses frequently, will change to long acting.  Added zetia at last visit but was too expensive ($80/month)  Add SLNTG  Repeat carotids.        Orders Placed This Encounter   Procedures     CV US Renal Artery Stenosis Hypertension Complete    CV Ultrasound Bilateral Doppler Carotid

## 2019-07-19 NOTE — LETTER
July 19, 2019      Lilian Bliss PA-C  [address]  [place] [address]           [facility] Cardiology  [address]  [place] [address]  Phone: [phone]          Patient: Mina Jo   MR Number: [id number]   YOB: 1960   Date of Visit: 7/19/2019       Dear Lilian Bliss:    Thank you for referring Mina Jo to me for evaluation. Attached you will find relevant portions of my assessment and plan of care.    If you have questions, please do not hesitate to call me. I look forward to following Mina Jo along with you.    Sincerely,    Pradip Zimmerman MD    Enclosure  CC:  No Recipients    If you would like to receive this communication electronically, please contact [email or web address] or [phone] to request more information on [facility] access.    For providers and/or their staff who would like to refer a patient to [facility], please contact us through our one-stop-shop provider referral line, [facility] , at [phone].    If you feel you have received this communication in error or would no longer like to receive these types of communications, please e-mail [email or web address]

## 2019-07-23 ENCOUNTER — CLINICAL SUPPORT (OUTPATIENT)
Dept: CARDIOLOGY | Facility: CLINIC | Age: 59
End: 2019-07-23
Attending: INTERNAL MEDICINE
Payer: COMMERCIAL

## 2019-07-23 ENCOUNTER — TELEPHONE (OUTPATIENT)
Dept: CARDIOLOGY | Facility: CLINIC | Age: 59
End: 2019-07-23

## 2019-07-23 DIAGNOSIS — I16.1 HYPERTENSIVE EMERGENCY WITHOUT CONGESTIVE HEART FAILURE: ICD-10-CM

## 2019-07-23 DIAGNOSIS — Z98.890 HISTORY OF CAROTID ENDARTERECTOMY: ICD-10-CM

## 2019-07-23 DIAGNOSIS — I10 ESSENTIAL HYPERTENSION: ICD-10-CM

## 2019-07-23 DIAGNOSIS — I65.23 BILATERAL CAROTID ARTERY STENOSIS: ICD-10-CM

## 2019-07-23 DIAGNOSIS — F17.200 TOBACCO DEPENDENCE: ICD-10-CM

## 2019-07-23 DIAGNOSIS — I73.9 PAD (PERIPHERAL ARTERY DISEASE): ICD-10-CM

## 2019-07-23 LAB
ABDOMINAL AORTA MID EDV: 0 CM/S
ABDOMINAL AORTA MID PSV: 69 CM/S
LEFT ARM SYSTOLIC BLOOD PRESSURE: 120 MMHG
LEFT CBA DIAS: 21 CM/S
LEFT CBA SYS: 117 CM/S
LEFT CCA DIST DIAS: 22 CM/S
LEFT CCA DIST SYS: 86 CM/S
LEFT CCA MID DIAS: 20 CM/S
LEFT CCA MID SYS: 85 CM/S
LEFT CCA PROX DIAS: 17 CM/S
LEFT CCA PROX SYS: 86 CM/S
LEFT ECA DIAS: 14 CM/S
LEFT ECA SYS: 85 CM/S
LEFT ICA DIST DIAS: 32 CM/S
LEFT ICA DIST SYS: 85 CM/S
LEFT ICA MID DIAS: 22 CM/S
LEFT ICA MID SYS: 82 CM/S
LEFT ICA PROX DIAS: 20 CM/S
LEFT ICA PROX SYS: 69 CM/S
LEFT RENAL DIST DIAS: 43 CM/S
LEFT RENAL DIST SYS: 124 CM/S
LEFT RENAL MID DIAS: 51 CM/S
LEFT RENAL MID RAR: 4.7
LEFT RENAL MID SYS: 324 CM/S
LEFT RENAL ORIGIN DIAS: 49 CM/S
LEFT RENAL ORIGIN SYS: 273 CM/S
LEFT RENAL PROX DIAS: 79 CM/S
LEFT RENAL PROX SYS: 389 CM/S
LEFT RENAL ULTRASOUND ACCELERATION TIME MEASUREMENT 1: 148 MS
LEFT RENAL ULTRASOUND ACCELERATION TIME MEASUREMENT 2: 166 MS
LEFT RENAL ULTRASOUND ACCELERATION TIME MEASUREMENT 3: 140 MS
LEFT RENAL ULTRASOUND ACCELERATION TIME MEASUREMENT AVERAGE: 166 MS
LEFT RENAL ULTRASOUND KIDNEY SIZE MEASUREMENT 1: 11.8 CM
LEFT RENAL ULTRASOUND KIDNEY SIZE MEASUREMENT 2: 12 CM
LEFT RENAL ULTRASOUND KIDNEY SIZE MEASUREMENT 3: 11.8 CM
LEFT RENAL ULTRASOUND KIDNEY SIZE MEASUREMENT AVERAGE: 12 CM
LEFT RENAL ULTRASOUND RESISTIVE INDEX MEASUREMENT 1: 0.62
LEFT RENAL ULTRASOUND RESISTIVE INDEX MEASUREMENT 2: 0.63
LEFT RENAL ULTRASOUND RESISTIVE INDEX MEASUREMENT 3: 0.62
LEFT RENAL ULTRASOUND RESISTIVE INDEX MEASUREMENT AVERAGE: 0.63
LEFT VERTEBRAL DIAS: 20 CM/S
LEFT VERTEBRAL SYS: 70 CM/S
OHS CV CAROTID RIGHT ICA EDV HIGHEST: 74
OHS CV CAROTID ULTRASOUND LEFT ICA/CCA RATIO: 0.99
OHS CV CAROTID ULTRASOUND RIGHT ICA/CCA RATIO: 2.66
OHS CV LEFT RENAL RAR: 5.64
OHS CV PV CAROTID LEFT HIGHEST CCA: 86
OHS CV PV CAROTID LEFT HIGHEST ICA: 85
OHS CV PV CAROTID RIGHT HIGHEST CCA: 83
OHS CV PV CAROTID RIGHT HIGHEST ICA: 221
OHS CV RIGHT RENAL RAR: 3.71
OHS CV US CAROTID LEFT HIGHEST EDV: 32
OHS CV US LEFT RENAL HIGHEST EDV: 79
OHS CV US LEFT RENAL HIGHEST PSV: 389
OHS CV US RIGHT RENAL HIGHEST EDV: 53
OHS CV US RIGHT RENAL HIGHEST PSV: 256
RIGHT ARM SYSTOLIC BLOOD PRESSURE: 120 MMHG
RIGHT CBA DIAS: 31 CM/S
RIGHT CBA SYS: 137 CM/S
RIGHT CCA DIST DIAS: 21 CM/S
RIGHT CCA DIST SYS: 78 CM/S
RIGHT CCA MID DIAS: 25 CM/S
RIGHT CCA MID SYS: 83 CM/S
RIGHT CCA PROX DIAS: 14 CM/S
RIGHT CCA PROX SYS: 77 CM/S
RIGHT ECA DIAS: 23 CM/S
RIGHT ECA SYS: 206 CM/S
RIGHT ICA DIST DIAS: 19 CM/S
RIGHT ICA DIST SYS: 70 CM/S
RIGHT ICA MID DIAS: 29 CM/S
RIGHT ICA MID SYS: 133 CM/S
RIGHT ICA PROX DIAS: 74 CM/S
RIGHT ICA PROX SYS: 221 CM/S
RIGHT RENAL DIST DIAS: 23 CM/S
RIGHT RENAL DIST SYS: 108 CM/S
RIGHT RENAL MID DIAS: 51 CM/S
RIGHT RENAL MID RAR: 3.71
RIGHT RENAL MID SYS: 256 CM/S
RIGHT RENAL ORIGIN DIAS: 35 CM/S
RIGHT RENAL ORIGIN SYS: 194 CM/S
RIGHT RENAL PROX DIAS: 53 CM/S
RIGHT RENAL PROX SYS: 238 CM/S
RIGHT RENAL ULTRASOUND ACCELERATION TIME MEASUREMENT 1: 69 MS
RIGHT RENAL ULTRASOUND ACCELERATION TIME MEASUREMENT 2: 63 MS
RIGHT RENAL ULTRASOUND ACCELERATION TIME MEASUREMENT 3: 54 MS
RIGHT RENAL ULTRASOUND ACCELERATION TIME MEASUREMENT AVERAGE: 69 MS
RIGHT RENAL ULTRASOUND KIDNEY SIZE MEASUREMENT 1: 11.4 CM
RIGHT RENAL ULTRASOUND KIDNEY SIZE MEASUREMENT 2: 11.3 CM
RIGHT RENAL ULTRASOUND KIDNEY SIZE MEASUREMENT 3: 11.4 CM
RIGHT RENAL ULTRASOUND KIDNEY SIZE MEASUREMENT AVERAGE: 11.4 CM
RIGHT RENAL ULTRASOUND RESISTIVE INDEX MEASUREMENT 1: 0.76
RIGHT RENAL ULTRASOUND RESISTIVE INDEX MEASUREMENT 2: 0.75
RIGHT RENAL ULTRASOUND RESISTIVE INDEX MEASUREMENT 3: 0.73
RIGHT RENAL ULTRASOUND RESISTIVE INDEX MEASUREMENT AVERAGE: 0.76
RIGHT VERTEBRAL DIAS: 13 CM/S
RIGHT VERTEBRAL SYS: 57 CM/S

## 2019-07-23 PROCEDURE — 93975 CV US RENAL ARTERY STENOSIS HYPERTENSION COMPLETE (CUPID ONLY): ICD-10-PCS | Mod: S$GLB,,, | Performed by: INTERNAL MEDICINE

## 2019-07-23 PROCEDURE — 93880 CV US DOPPLER CAROTID (CUPID ONLY): ICD-10-PCS | Mod: S$GLB,,, | Performed by: INTERNAL MEDICINE

## 2019-07-23 PROCEDURE — 93975 VASCULAR STUDY: CPT | Mod: S$GLB,,, | Performed by: INTERNAL MEDICINE

## 2019-07-23 PROCEDURE — 93880 EXTRACRANIAL BILAT STUDY: CPT | Mod: S$GLB,,, | Performed by: INTERNAL MEDICINE

## 2019-07-23 NOTE — TELEPHONE ENCOUNTER
Called patient told her that we have not gotten the results.  As soon as we get the results we will call.

## 2019-07-23 NOTE — TELEPHONE ENCOUNTER
----- Message from Katelyn Lira MA sent at 7/23/2019 10:50 AM CDT -----  Contact: patient called  The patient would like his test result for his US  Carotid that was schedule for today. Please call [phone]. Thank you.

## 2019-07-25 ENCOUNTER — TELEPHONE (OUTPATIENT)
Dept: CARDIOLOGY | Facility: CLINIC | Age: 59
End: 2019-07-25

## 2019-07-25 DIAGNOSIS — I25.10 CORONARY ARTERY DISEASE, ANGINA PRESENCE UNSPECIFIED, UNSPECIFIED VESSEL OR LESION TYPE, UNSPECIFIED WHETHER NATIVE OR TRANSPLANTED HEART: Primary | ICD-10-CM

## 2019-07-25 NOTE — TELEPHONE ENCOUNTER
Dr Zimmerman reviewed the test and he said said that they are not normal but at this time he just want to keep an eye on things.  He would like to repeat the ultrasounds in about 6 months. Called patient gave the results patient verbalized understanding.

## 2019-07-25 NOTE — TELEPHONE ENCOUNTER
Patient said that he is feeling really bad fatigue and he just doesn't feel right.  So Dr Zimmerman said to do an nuc med test, patient was fine with that test August 2.

## 2019-07-30 ENCOUNTER — TELEPHONE (OUTPATIENT)
Dept: SMOKING CESSATION | Facility: CLINIC | Age: 59
End: 2019-07-30

## 2019-07-30 NOTE — TELEPHONE ENCOUNTER
7/30/19   4:00 pm    Telephone call to patient to re-schedule missed intake assessment Thursday 7/25/19.  Patient said he hadn't been doing well as far as his health and was scheduled for some tests and follow ups.  He asked if he could call back to schedule when he knew exactly when all this was scheduled.

## 2019-08-02 ENCOUNTER — CLINICAL SUPPORT (OUTPATIENT)
Dept: CARDIOLOGY | Facility: CLINIC | Age: 59
End: 2019-08-02
Attending: INTERNAL MEDICINE
Payer: COMMERCIAL

## 2019-08-02 VITALS — WEIGHT: 165 LBS | HEIGHT: 72 IN | BODY MASS INDEX: 22.35 KG/M2

## 2019-08-02 DIAGNOSIS — I25.10 CORONARY ARTERY DISEASE, ANGINA PRESENCE UNSPECIFIED, UNSPECIFIED VESSEL OR LESION TYPE, UNSPECIFIED WHETHER NATIVE OR TRANSPLANTED HEART: ICD-10-CM

## 2019-08-02 LAB
CV PHARM DOSE: 0.4 MG
CV STRESS BASE HR: 61 BPM
DIASTOLIC BLOOD PRESSURE: 67 MMHG
END DIASTOLIC INDEX-HIGH: 170 ML/M2
END SYSTOLIC INDEX-HIGH: 70 ML/M2
NUC REST DIASTOLIC VOLUME INDEX: 83
NUC REST EJECTION FRACTION: 75
NUC REST SYSTOLIC VOLUME INDEX: 21
OHS CV CPX 1 MINUTE RECOVERY HEART RATE: 96 BPM
OHS CV CPX 85 PERCENT MAX PREDICTED HEART RATE MALE: 137
OHS CV CPX MAX PREDICTED HEART RATE: 161
OHS CV CPX PATIENT IS FEMALE: 0
OHS CV CPX PATIENT IS MALE: 1
OHS CV CPX PEAK DIASTOLIC BLOOD PRESSURE: 73 MMHG
OHS CV CPX PEAK HEAR RATE: 59 BPM
OHS CV CPX PEAK RATE PRESSURE PRODUCT: 7788
OHS CV CPX PEAK SYSTOLIC BLOOD PRESSURE: 132 MMHG
OHS CV CPX PERCENT MAX PREDICTED HEART RATE ACHIEVED: 37
OHS CV CPX RATE PRESSURE PRODUCT PRESENTING: 8235
RETIRED EF AND QEF - SEE NOTES: 51 %
STRESS ECHO TARGET HR: 136.85 BPM
SYSTOLIC BLOOD PRESSURE: 135 MMHG

## 2019-08-02 PROCEDURE — 93015 STRESS TEST WITH MYOCARDIAL PERFUSION (CUPID ONLY): ICD-10-PCS | Mod: S$GLB,,, | Performed by: INTERNAL MEDICINE

## 2019-08-02 PROCEDURE — A9502 TC99M TETROFOSMIN: HCPCS | Mod: S$GLB,,, | Performed by: INTERNAL MEDICINE

## 2019-08-02 PROCEDURE — 99999 PR PBB SHADOW E&M-EST. PATIENT-LVL I: ICD-10-PCS | Mod: PBBFAC,,,

## 2019-08-02 PROCEDURE — 78452 HT MUSCLE IMAGE SPECT MULT: CPT | Mod: S$GLB,,, | Performed by: INTERNAL MEDICINE

## 2019-08-02 PROCEDURE — 78452 STRESS TEST WITH MYOCARDIAL PERFUSION (CUPID ONLY): ICD-10-PCS | Mod: S$GLB,,, | Performed by: INTERNAL MEDICINE

## 2019-08-02 PROCEDURE — A9502 STRESS TEST WITH MYOCARDIAL PERFUSION (CUPID ONLY): ICD-10-PCS | Mod: S$GLB,,, | Performed by: INTERNAL MEDICINE

## 2019-08-02 PROCEDURE — 93015 CV STRESS TEST SUPVJ I&R: CPT | Mod: S$GLB,,, | Performed by: INTERNAL MEDICINE

## 2019-08-02 PROCEDURE — 99999 PR PBB SHADOW E&M-EST. PATIENT-LVL I: CPT | Mod: PBBFAC,,,

## 2019-08-02 RX ORDER — REGADENOSON 0.08 MG/ML
0.4 INJECTION, SOLUTION INTRAVENOUS
Status: COMPLETED | OUTPATIENT
Start: 2019-08-02 | End: 2019-08-02

## 2019-08-02 RX ADMIN — REGADENOSON 0.4 MG: 0.08 INJECTION, SOLUTION INTRAVENOUS at 08:08

## 2019-08-12 ENCOUNTER — TELEPHONE (OUTPATIENT)
Dept: CARDIOLOGY | Facility: CLINIC | Age: 59
End: 2019-08-12

## 2019-08-12 NOTE — TELEPHONE ENCOUNTER
----- Message from Pradip Zimmerman MD sent at 8/12/2019 10:57 AM CDT -----  Please contact and inform pt that stress test looked good.

## 2019-10-24 DIAGNOSIS — I63.9 CEREBROVASCULAR ACCIDENT (CVA), UNSPECIFIED MECHANISM: ICD-10-CM

## 2019-10-24 DIAGNOSIS — I63.412 CEREBROVASCULAR ACCIDENT (CVA) DUE TO EMBOLISM OF LEFT MIDDLE CEREBRAL ARTERY: ICD-10-CM

## 2019-10-25 DIAGNOSIS — I63.9 CEREBROVASCULAR ACCIDENT (CVA), UNSPECIFIED MECHANISM: ICD-10-CM

## 2019-10-25 DIAGNOSIS — I63.412 CEREBROVASCULAR ACCIDENT (CVA) DUE TO EMBOLISM OF LEFT MIDDLE CEREBRAL ARTERY: ICD-10-CM

## 2019-10-25 RX ORDER — CLOPIDOGREL BISULFATE 75 MG/1
75 TABLET ORAL DAILY
Qty: 30 TABLET | Refills: 0 | Status: SHIPPED | OUTPATIENT
Start: 2019-10-25 | End: 2019-10-28 | Stop reason: SDUPTHER

## 2019-10-25 RX ORDER — ATORVASTATIN CALCIUM 80 MG/1
80 TABLET, FILM COATED ORAL NIGHTLY
Qty: 90 TABLET | Refills: 3 | OUTPATIENT
Start: 2019-10-25 | End: 2020-10-24

## 2019-10-25 RX ORDER — ATORVASTATIN CALCIUM 80 MG/1
80 TABLET, FILM COATED ORAL NIGHTLY
Qty: 30 TABLET | Refills: 0 | Status: SHIPPED | OUTPATIENT
Start: 2019-10-25 | End: 2019-10-28 | Stop reason: SDUPTHER

## 2019-10-25 RX ORDER — CLOPIDOGREL BISULFATE 75 MG/1
75 TABLET ORAL DAILY
Qty: 90 TABLET | Refills: 3 | OUTPATIENT
Start: 2019-10-25 | End: 2020-10-24

## 2019-10-28 DIAGNOSIS — I63.9 CEREBROVASCULAR ACCIDENT (CVA), UNSPECIFIED MECHANISM: ICD-10-CM

## 2019-10-28 DIAGNOSIS — I63.412 CEREBROVASCULAR ACCIDENT (CVA) DUE TO EMBOLISM OF LEFT MIDDLE CEREBRAL ARTERY: ICD-10-CM

## 2019-10-28 RX ORDER — ATORVASTATIN CALCIUM 80 MG/1
80 TABLET, FILM COATED ORAL NIGHTLY
Qty: 30 TABLET | Refills: 0 | Status: SHIPPED | OUTPATIENT
Start: 2019-10-28

## 2019-10-28 RX ORDER — CLOPIDOGREL BISULFATE 75 MG/1
75 TABLET ORAL DAILY
Qty: 30 TABLET | Refills: 0 | Status: SHIPPED | OUTPATIENT
Start: 2019-10-28

## 2021-03-02 ENCOUNTER — OFFICE VISIT (OUTPATIENT)
Dept: URGENT CARE | Facility: CLINIC | Age: 61
End: 2021-03-02
Payer: COMMERCIAL

## 2021-03-02 VITALS
BODY MASS INDEX: 22.35 KG/M2 | HEIGHT: 72 IN | DIASTOLIC BLOOD PRESSURE: 92 MMHG | SYSTOLIC BLOOD PRESSURE: 181 MMHG | HEART RATE: 81 BPM | RESPIRATION RATE: 19 BRPM | OXYGEN SATURATION: 97 % | TEMPERATURE: 99 F | WEIGHT: 165 LBS

## 2021-03-02 DIAGNOSIS — J06.9 VIRAL URI WITH COUGH: ICD-10-CM

## 2021-03-02 DIAGNOSIS — R05.9 COUGH: ICD-10-CM

## 2021-03-02 DIAGNOSIS — Z20.822 CLOSE EXPOSURE TO COVID-19 VIRUS: Primary | ICD-10-CM

## 2021-03-02 LAB
CTP QC/QA: YES
SARS-COV-2 RDRP RESP QL NAA+PROBE: NEGATIVE

## 2021-03-02 PROCEDURE — U0002: ICD-10-PCS | Mod: QW,S$GLB,, | Performed by: PHYSICIAN ASSISTANT

## 2021-03-02 PROCEDURE — 3008F BODY MASS INDEX DOCD: CPT | Mod: CPTII,S$GLB,, | Performed by: PHYSICIAN ASSISTANT

## 2021-03-02 PROCEDURE — 99203 OFFICE O/P NEW LOW 30 MIN: CPT | Mod: S$GLB,,, | Performed by: PHYSICIAN ASSISTANT

## 2021-03-02 PROCEDURE — U0002 COVID-19 LAB TEST NON-CDC: HCPCS | Mod: QW,S$GLB,, | Performed by: PHYSICIAN ASSISTANT

## 2021-03-02 PROCEDURE — 99203 PR OFFICE/OUTPT VISIT, NEW, LEVL III, 30-44 MIN: ICD-10-PCS | Mod: S$GLB,,, | Performed by: PHYSICIAN ASSISTANT

## 2021-03-02 PROCEDURE — 3008F PR BODY MASS INDEX (BMI) DOCUMENTED: ICD-10-PCS | Mod: CPTII,S$GLB,, | Performed by: PHYSICIAN ASSISTANT

## 2021-03-02 RX ORDER — QUETIAPINE FUMARATE 50 MG/1
50 TABLET, FILM COATED ORAL
COMMUNITY
End: 2021-11-30 | Stop reason: DRUGHIGH

## 2021-03-02 RX ORDER — ESOMEPRAZOLE MAGNESIUM 40 MG/1
40 CAPSULE, DELAYED RELEASE ORAL
Status: ON HOLD | COMMUNITY
End: 2023-09-27

## 2021-03-02 RX ORDER — METOPROLOL SUCCINATE 50 MG/1
50 TABLET, EXTENDED RELEASE ORAL 2 TIMES DAILY
COMMUNITY
Start: 2020-11-02

## 2021-03-02 RX ORDER — FAMOTIDINE 20 MG/1
20 TABLET, FILM COATED ORAL
COMMUNITY
Start: 2020-11-16 | End: 2021-11-30

## 2021-11-30 ENCOUNTER — HOSPITAL ENCOUNTER (EMERGENCY)
Facility: HOSPITAL | Age: 61
Discharge: HOME | End: 2021-11-30
Attending: EMERGENCY MEDICINE
Payer: COMMERCIAL

## 2021-11-30 VITALS
HEART RATE: 76 BPM | WEIGHT: 170 LBS | OXYGEN SATURATION: 98 % | TEMPERATURE: 99 F | DIASTOLIC BLOOD PRESSURE: 64 MMHG | RESPIRATION RATE: 17 BRPM | BODY MASS INDEX: 23.06 KG/M2 | SYSTOLIC BLOOD PRESSURE: 166 MMHG

## 2021-11-30 DIAGNOSIS — R10.9 ABDOMINAL PAIN: ICD-10-CM

## 2021-11-30 DIAGNOSIS — R10.11 RIGHT UPPER QUADRANT ABDOMINAL PAIN: Primary | ICD-10-CM

## 2021-11-30 LAB
ALBUMIN SERPL BCP-MCNC: 3.5 G/DL (ref 3.5–5.2)
ALP SERPL-CCNC: 130 U/L (ref 55–135)
ALT SERPL W/O P-5'-P-CCNC: 46 U/L (ref 10–44)
ANION GAP SERPL CALC-SCNC: 11 MMOL/L (ref 8–16)
AST SERPL-CCNC: 34 U/L (ref 10–40)
BASOPHILS # BLD AUTO: 0.07 K/UL (ref 0–0.2)
BASOPHILS NFR BLD: 0.8 % (ref 0–1.9)
BILIRUB SERPL-MCNC: 0.4 MG/DL (ref 0.1–1)
BUN SERPL-MCNC: 15 MG/DL (ref 8–23)
CALCIUM SERPL-MCNC: 8.4 MG/DL (ref 8.7–10.5)
CHLORIDE SERPL-SCNC: 108 MMOL/L (ref 95–110)
CO2 SERPL-SCNC: 23 MMOL/L (ref 23–29)
CREAT SERPL-MCNC: 1.1 MG/DL (ref 0.5–1.4)
DIFFERENTIAL METHOD: ABNORMAL
EOSINOPHIL # BLD AUTO: 0.2 K/UL (ref 0–0.5)
EOSINOPHIL NFR BLD: 2.5 % (ref 0–8)
ERYTHROCYTE [DISTWIDTH] IN BLOOD BY AUTOMATED COUNT: 12.9 % (ref 11.5–14.5)
EST. GFR  (AFRICAN AMERICAN): >60 ML/MIN/1.73 M^2
EST. GFR  (NON AFRICAN AMERICAN): >60 ML/MIN/1.73 M^2
GLUCOSE SERPL-MCNC: 135 MG/DL (ref 70–110)
HCT VFR BLD AUTO: 41.8 % (ref 40–54)
HGB BLD-MCNC: 14.4 G/DL (ref 14–18)
IMM GRANULOCYTES # BLD AUTO: 0.02 K/UL (ref 0–0.04)
IMM GRANULOCYTES NFR BLD AUTO: 0.2 % (ref 0–0.5)
LIPASE SERPL-CCNC: 33 U/L (ref 4–60)
LYMPHOCYTES # BLD AUTO: 2.4 K/UL (ref 1–4.8)
LYMPHOCYTES NFR BLD: 26.2 % (ref 18–48)
MCH RBC QN AUTO: 32.2 PG (ref 27–31)
MCHC RBC AUTO-ENTMCNC: 34.4 G/DL (ref 32–36)
MCV RBC AUTO: 94 FL (ref 82–98)
MONOCYTES # BLD AUTO: 0.5 K/UL (ref 0.3–1)
MONOCYTES NFR BLD: 5.6 % (ref 4–15)
NEUTROPHILS # BLD AUTO: 6 K/UL (ref 1.8–7.7)
NEUTROPHILS NFR BLD: 64.7 % (ref 38–73)
NRBC BLD-RTO: 0 /100 WBC
PLATELET # BLD AUTO: 248 K/UL (ref 150–450)
PMV BLD AUTO: 9.5 FL (ref 9.2–12.9)
POTASSIUM SERPL-SCNC: 4.4 MMOL/L (ref 3.5–5.1)
PROT SERPL-MCNC: 7 G/DL (ref 6–8.4)
RBC # BLD AUTO: 4.47 M/UL (ref 4.6–6.2)
SODIUM SERPL-SCNC: 142 MMOL/L (ref 136–145)
WBC # BLD AUTO: 9.23 K/UL (ref 3.9–12.7)

## 2021-11-30 PROCEDURE — 25000003 PHARM REV CODE 250: Performed by: EMERGENCY MEDICINE

## 2021-11-30 PROCEDURE — 96374 THER/PROPH/DIAG INJ IV PUSH: CPT

## 2021-11-30 PROCEDURE — 80053 COMPREHEN METABOLIC PANEL: CPT | Performed by: EMERGENCY MEDICINE

## 2021-11-30 PROCEDURE — 85025 COMPLETE CBC W/AUTO DIFF WBC: CPT | Performed by: EMERGENCY MEDICINE

## 2021-11-30 PROCEDURE — 83690 ASSAY OF LIPASE: CPT | Performed by: EMERGENCY MEDICINE

## 2021-11-30 PROCEDURE — 99285 EMERGENCY DEPT VISIT HI MDM: CPT | Mod: 25

## 2021-11-30 PROCEDURE — 96375 TX/PRO/DX INJ NEW DRUG ADDON: CPT

## 2021-11-30 PROCEDURE — 96361 HYDRATE IV INFUSION ADD-ON: CPT

## 2021-11-30 PROCEDURE — 63600175 PHARM REV CODE 636 W HCPCS: Performed by: EMERGENCY MEDICINE

## 2021-11-30 RX ORDER — QUETIAPINE FUMARATE 25 MG/1
25 TABLET, FILM COATED ORAL DAILY
Status: ON HOLD | COMMUNITY
Start: 2021-11-13 | End: 2023-09-27

## 2021-11-30 RX ORDER — METOCLOPRAMIDE 10 MG/1
10 TABLET ORAL EVERY 6 HOURS PRN
Qty: 14 TABLET | Refills: 0 | Status: ON HOLD | OUTPATIENT
Start: 2021-11-30 | End: 2023-09-27

## 2021-11-30 RX ORDER — DICYCLOMINE HYDROCHLORIDE 20 MG/1
20 TABLET ORAL 3 TIMES DAILY PRN
Qty: 14 TABLET | Refills: 0 | Status: SHIPPED | OUTPATIENT
Start: 2021-11-30 | End: 2021-12-30

## 2021-11-30 RX ORDER — METOCLOPRAMIDE HYDROCHLORIDE 5 MG/ML
10 INJECTION INTRAMUSCULAR; INTRAVENOUS
Status: COMPLETED | OUTPATIENT
Start: 2021-11-30 | End: 2021-11-30

## 2021-11-30 RX ORDER — IBUPROFEN 200 MG
200 TABLET ORAL EVERY 6 HOURS PRN
Status: ON HOLD | COMMUNITY
End: 2023-09-27

## 2021-11-30 RX ORDER — KETOROLAC TROMETHAMINE 30 MG/ML
15 INJECTION, SOLUTION INTRAMUSCULAR; INTRAVENOUS
Status: COMPLETED | OUTPATIENT
Start: 2021-11-30 | End: 2021-11-30

## 2021-11-30 RX ADMIN — METOCLOPRAMIDE 10 MG: 5 INJECTION, SOLUTION INTRAMUSCULAR; INTRAVENOUS at 01:11

## 2021-11-30 RX ADMIN — SODIUM CHLORIDE 1000 ML: 0.9 INJECTION, SOLUTION INTRAVENOUS at 01:11

## 2021-11-30 RX ADMIN — LIDOCAINE HYDROCHLORIDE: 20 SOLUTION ORAL; TOPICAL at 02:11

## 2021-11-30 RX ADMIN — KETOROLAC TROMETHAMINE 15 MG: 30 INJECTION, SOLUTION INTRAMUSCULAR; INTRAVENOUS at 01:11

## 2023-09-27 PROBLEM — I20.89 STABLE ANGINA PECTORIS: Status: ACTIVE | Noted: 2023-09-27

## 2024-09-03 ENCOUNTER — HOSPITAL ENCOUNTER (EMERGENCY)
Facility: HOSPITAL | Age: 64
Discharge: HOME | End: 2024-09-03
Attending: EMERGENCY MEDICINE
Payer: COMMERCIAL

## 2024-09-03 VITALS
DIASTOLIC BLOOD PRESSURE: 67 MMHG | HEART RATE: 64 BPM | WEIGHT: 195 LBS | BODY MASS INDEX: 26.41 KG/M2 | RESPIRATION RATE: 20 BRPM | TEMPERATURE: 98 F | HEIGHT: 72 IN | OXYGEN SATURATION: 95 % | SYSTOLIC BLOOD PRESSURE: 138 MMHG

## 2024-09-03 DIAGNOSIS — N20.0 KIDNEY STONE: ICD-10-CM

## 2024-09-03 DIAGNOSIS — N40.0 ENLARGED PROSTATE: ICD-10-CM

## 2024-09-03 DIAGNOSIS — I70.90 ATHEROSCLEROTIC PLAQUE: ICD-10-CM

## 2024-09-03 DIAGNOSIS — R10.9 FLANK PAIN: Primary | ICD-10-CM

## 2024-09-03 LAB
ALBUMIN SERPL BCP-MCNC: 3.9 G/DL (ref 3.5–5.2)
ALP SERPL-CCNC: 104 U/L (ref 55–135)
ALT SERPL W/O P-5'-P-CCNC: 24 U/L (ref 10–44)
ANION GAP SERPL CALC-SCNC: 7 MMOL/L (ref 8–16)
AST SERPL-CCNC: 19 U/L (ref 10–40)
BASOPHILS # BLD AUTO: 0.04 K/UL (ref 0–0.2)
BASOPHILS NFR BLD: 0.6 % (ref 0–1.9)
BILIRUB SERPL-MCNC: 0.6 MG/DL (ref 0.1–1)
BILIRUB UR QL STRIP: NEGATIVE
BUN SERPL-MCNC: 10 MG/DL (ref 8–23)
CALCIUM SERPL-MCNC: 9.4 MG/DL (ref 8.7–10.5)
CHLORIDE SERPL-SCNC: 106 MMOL/L (ref 95–110)
CLARITY UR: CLEAR
CO2 SERPL-SCNC: 26 MMOL/L (ref 23–29)
COLOR UR: YELLOW
CREAT SERPL-MCNC: 0.9 MG/DL (ref 0.5–1.4)
DIFFERENTIAL METHOD BLD: ABNORMAL
EOSINOPHIL # BLD AUTO: 0.1 K/UL (ref 0–0.5)
EOSINOPHIL NFR BLD: 1.6 % (ref 0–8)
ERYTHROCYTE [DISTWIDTH] IN BLOOD BY AUTOMATED COUNT: 13.2 % (ref 11.5–14.5)
EST. GFR  (NO RACE VARIABLE): >60 ML/MIN/1.73 M^2
GLUCOSE SERPL-MCNC: 126 MG/DL (ref 70–110)
GLUCOSE UR QL STRIP: ABNORMAL
HCT VFR BLD AUTO: 45.4 % (ref 40–54)
HGB BLD-MCNC: 15.9 G/DL (ref 14–18)
HGB UR QL STRIP: NEGATIVE
IMM GRANULOCYTES # BLD AUTO: 0.02 K/UL (ref 0–0.04)
IMM GRANULOCYTES NFR BLD AUTO: 0.3 % (ref 0–0.5)
KETONES UR QL STRIP: NEGATIVE
LEUKOCYTE ESTERASE UR QL STRIP: NEGATIVE
LYMPHOCYTES # BLD AUTO: 1.8 K/UL (ref 1–4.8)
LYMPHOCYTES NFR BLD: 25.6 % (ref 18–48)
MCH RBC QN AUTO: 31.9 PG (ref 27–31)
MCHC RBC AUTO-ENTMCNC: 35 G/DL (ref 32–36)
MCV RBC AUTO: 91 FL (ref 82–98)
MONOCYTES # BLD AUTO: 0.6 K/UL (ref 0.3–1)
MONOCYTES NFR BLD: 8.1 % (ref 4–15)
NEUTROPHILS # BLD AUTO: 4.4 K/UL (ref 1.8–7.7)
NEUTROPHILS NFR BLD: 63.8 % (ref 38–73)
NITRITE UR QL STRIP: NEGATIVE
NRBC BLD-RTO: 0 /100 WBC
PH UR STRIP: 7 [PH] (ref 5–8)
PLATELET # BLD AUTO: 190 K/UL (ref 150–450)
PMV BLD AUTO: 10.1 FL (ref 9.2–12.9)
POTASSIUM SERPL-SCNC: 4 MMOL/L (ref 3.5–5.1)
PROT SERPL-MCNC: 7.7 G/DL (ref 6–8.4)
PROT UR QL STRIP: NEGATIVE
RBC # BLD AUTO: 4.99 M/UL (ref 4.6–6.2)
SODIUM SERPL-SCNC: 139 MMOL/L (ref 136–145)
SP GR UR STRIP: 1.02 (ref 1–1.03)
URN SPEC COLLECT METH UR: ABNORMAL
UROBILINOGEN UR STRIP-ACNC: NEGATIVE EU/DL
WBC # BLD AUTO: 6.88 K/UL (ref 3.9–12.7)

## 2024-09-03 PROCEDURE — 85025 COMPLETE CBC W/AUTO DIFF WBC: CPT | Performed by: NURSE PRACTITIONER

## 2024-09-03 PROCEDURE — 80053 COMPREHEN METABOLIC PANEL: CPT | Performed by: NURSE PRACTITIONER

## 2024-09-03 PROCEDURE — 96375 TX/PRO/DX INJ NEW DRUG ADDON: CPT

## 2024-09-03 PROCEDURE — 81003 URINALYSIS AUTO W/O SCOPE: CPT | Performed by: NURSE PRACTITIONER

## 2024-09-03 PROCEDURE — 99285 EMERGENCY DEPT VISIT HI MDM: CPT | Mod: 25

## 2024-09-03 PROCEDURE — 63600175 PHARM REV CODE 636 W HCPCS: Performed by: NURSE PRACTITIONER

## 2024-09-03 PROCEDURE — 96374 THER/PROPH/DIAG INJ IV PUSH: CPT

## 2024-09-03 RX ORDER — ONDANSETRON HYDROCHLORIDE 2 MG/ML
4 INJECTION, SOLUTION INTRAVENOUS
Status: COMPLETED | OUTPATIENT
Start: 2024-09-03 | End: 2024-09-03

## 2024-09-03 RX ORDER — OXYCODONE AND ACETAMINOPHEN 5; 325 MG/1; MG/1
1 TABLET ORAL EVERY 6 HOURS PRN
Qty: 8 TABLET | Refills: 0 | Status: SHIPPED | OUTPATIENT
Start: 2024-09-03 | End: 2024-09-05

## 2024-09-03 RX ORDER — MORPHINE SULFATE 4 MG/ML
4 INJECTION, SOLUTION INTRAMUSCULAR; INTRAVENOUS
Status: COMPLETED | OUTPATIENT
Start: 2024-09-03 | End: 2024-09-03

## 2024-09-03 RX ADMIN — MORPHINE SULFATE 4 MG: 4 INJECTION INTRAVENOUS at 08:09

## 2024-09-03 RX ADMIN — ONDANSETRON 4 MG: 2 INJECTION INTRAMUSCULAR; INTRAVENOUS at 08:09

## 2024-09-03 NOTE — DISCHARGE INSTRUCTIONS

## 2024-09-03 NOTE — ED PROVIDER NOTES
Encounter Date: 9/3/2024       History     Chief Complaint   Patient presents with    Back Pain     C/o nontraumatic lower back pain radiating to L flank and left leg x4 days. Denies urinary sx. Denies other sx. Took ibuprofen w/o sx relief.      64-year-old male presents emergency room with reports of left flank pain.  Patient states that he has a history of kidney stones and states this feels quite similar to his past episode.  He denies any testicular pain or swelling.  Denies any dysuria or hematuria.  Denies fever.  Patient states he took over-the-counter medication for symptom control with no improvement in his condition.  He has a past medical history of anxiety, stroke, CAD, COVID.    The history is provided by the patient. No  was used.     Review of patient's allergies indicates:  No Known Allergies  Past Medical History:   Diagnosis Date    Anxiety     Cerebral infarction due to embolism of left middle cerebral artery 09/25/2017    Coronary artery disease     COVID     HX OF    Stroke     Stroke due to stenosis of left carotid artery 01/09/2018    Stroke due to stenosis of left carotid artery 01/09/2018     Past Surgical History:   Procedure Laterality Date    BYPASS GRAFT      triple bypass 2009    CARDIAC SURGERY      triple bypass x 2    CAROTID ENDARECTOMY Bilateral     CORONARY ANGIOPLASTY WITH STENT PLACEMENT N/A 9/27/2023    Procedure: ANGIOPLASTY WITH STENT PLACEMENT, PCI RCA with laser and RPL intervention;  Surgeon: Milton Lake MD;  Location: [facility];  Service: Cardiovascular;  Laterality: N/A;    ROTATOR CUFF REPAIR Right      Family History   Problem Relation Name Age of Onset    Stroke Mother      Heart disease Mother      Diabetes Mother      Heart disease Father      Heart disease Brother  56    Cancer Neg Hx      Kidney disease Neg Hx      Hypertension Neg Hx       Social History     Tobacco Use    Smoking status: Former     Current packs/day: 1.00     Average  packs/day: 1 pack/day for 46.7 years (46.7 ttl pk-yrs)     Types: Cigarettes     Start date: 1978    Smokeless tobacco: Never    Tobacco comments:     Pt enrolled in [employer]. Ambulatory referral to Smoking Cessation program.   Substance Use Topics    Alcohol use: Yes     Comment: BEER ON  WEEKENDS    Drug use: No     Review of Systems   Genitourinary:  Positive for flank pain.   All other systems reviewed and are negative.      Physical Exam     Initial Vitals [09/03/24 0813]   BP Pulse Resp Temp SpO2   (!) 199/84 81 20 97.5 °F (36.4 °C) 98 %      MAP       --         Physical Exam    Constitutional: He appears well-developed and well-nourished. He is not diaphoretic. No distress.   HENT:   Head: Normocephalic and atraumatic.   Right Ear: Hearing and tympanic membrane normal.   Left Ear: Hearing and tympanic membrane normal.   Nose: Nose normal.   Mouth/Throat: Uvula is midline, oropharynx is clear and moist and mucous membranes are normal.   Eyes: Lids are normal. Pupils are equal, round, and reactive to light.   Cardiovascular:  Normal rate.           Pulmonary/Chest: Effort normal and breath sounds normal. No respiratory distress. He has no wheezes. He has no rhonchi.   Abdominal: Abdomen is soft. There is no abdominal tenderness.   There is left CVA tenderness.   Musculoskeletal:         General: Normal range of motion.      Cervical back: No rigidity.     Neurological: He is oriented to person, place, and time.   Skin: Skin is warm and dry. No rash noted.   Psychiatric: He has a normal mood and affect. His behavior is normal. Judgment and thought content normal.         ED Course   Procedures  Labs Reviewed   CBC W/ AUTO DIFFERENTIAL - Abnormal       Result Value    WBC 6.88      RBC 4.99      Hemoglobin 15.9      Hematocrit 45.4      MCV 91      MCH 31.9 (*)     MCHC 35.0      RDW 13.2      Platelets 190      MPV 10.1      Immature Granulocytes 0.3      Gran # (ANC) 4.4      Immature Grans (Abs) 0.02       Lymph # 1.8      Mono # 0.6      Eos # 0.1      Baso # 0.04      nRBC 0      Gran % 63.8      Lymph % 25.6      Mono % 8.1      Eosinophil % 1.6      Basophil % 0.6      Differential Method Automated     COMPREHENSIVE METABOLIC PANEL - Abnormal    Sodium 139      Potassium 4.0      Chloride 106      CO2 26      Glucose 126 (*)     BUN 10      Creatinine 0.9      Calcium 9.4      Total Protein 7.7      Albumin 3.9      Total Bilirubin 0.6      Alkaline Phosphatase 104      AST 19      ALT 24      eGFR >60      Anion Gap 7 (*)    URINALYSIS, REFLEX TO URINE CULTURE - Abnormal    Specimen UA Urine, Clean Catch      Color, UA Yellow      Appearance, UA Clear      pH, UA 7.0      Specific Gravity, UA 1.025      Protein, UA Negative      Glucose, UA 2+ (*)     Ketones, UA Negative      Bilirubin (UA) Negative      Occult Blood UA Negative      Nitrite, UA Negative      Urobilinogen, UA Negative      Leukocytes, UA Negative      Narrative:     Specimen Source->Urine          Imaging Results              CT Renal Stone Study ABD Pelvis WO (Final result)  Result time 09/03/24 10:03:11      Final result by Terrell Huber MD (09/03/24 10:03:11)                   Impression:      1. No definite evidence of obstructive uropathy.  Nonspecific mild asymmetric left-sided perinephric stranding, could relate to sequela of previously passed stone versus infectious/noninfectious inflammatory etiology.  Clinical correlation recommended.  2. Nonspecific circumferential wall thickening the urinary bladder could relate to bladder outlet obstruction given enlargement of the prostate gland.  Infectious or noninfectious inflammatory cystitis could appear similar.  3. Additional details of chronic and incidental findings, as provided in the body of the report.      Electronically signed by: Terrell Huber  Date:    09/03/2024  Time:    10:03               Narrative:    EXAMINATION:  CT RENAL STONE STUDY ABD PELVIS WO    CLINICAL  HISTORY:  Flank pain, kidney stone suspected;    TECHNIQUE:  Low dose axial images, sagittal and coronal reformations were obtained from the lung bases to the pubic symphysis.  Contrast was not administered.    COMPARISON:  Abdominal radiograph 11/30/2021.    FINDINGS:  Evaluation of the solid organs is limited due to lack of intravenous contrast.    Lower chest: Atelectasis and/or scar at the lung bases.    URINARY COLLECTING SYSTEM: No evidence of obstructive uropathy.  Prominent vascular calcifications noted at the renal renee.  Possible additional small nonobstructing renal calculi bilaterally.  Asymmetric left-sided perinephric stranding.    Liver: Findings in keeping with hepatic steatosis.    Gallbladder and bile ducts: Unremarkable.    Pancreas: Normal contour.    Spleen: Normal contour.    Adrenals: Normal contour.    Lymph nodes: No abdominal or pelvic lymphadenopathy.    Bowel and mesentery: No evidence of bowel obstruction.  Submucosal fat attenuation involving the proximal stomach, as well as cecum and proximal colon, possibly sequela of chronic nonspecific inflammation.  Colonic diverticulosis.  Normal appendix.    Abdominal aorta: Nonaneurysmal.  Heavy atherosclerotic calcification.    Inferior vena cava: Unremarkable.    Free fluid or free air: None.    Pelvis: Enlarged prostate.  Mild circumferential wall thickening of the urinary bladder.    Body wall: Small fat containing inguinal hernias bilaterally.    Bones: Remote healed left-sided rib fractures.  No definite acute findings.  Degenerative findings of the spine.                                       Medications   morphine injection 4 mg (4 mg Intravenous Given 9/3/24 0844)   ondansetron injection 4 mg (4 mg Intravenous Given 9/3/24 0843)     Medical Decision Making  Differential Diagnosis includes, but is not limited to:  AAA, aortic dissection, SBO/volvulus, intussusception, ileus, appendicitis, cholecystitis, hepatitis, nephrolithiasis,  pancreatitis, IBD/IBS, biliary colic, GERD, PUD, constipation, UTI/pyelonephritis, musculoskeletal pain.        Amount and/or Complexity of Data Reviewed  Labs: ordered. Decision-making details documented in ED Course.  Radiology: ordered.    Risk  Prescription drug management.               ED Course as of 09/03/24 1050   Tue Sep 03, 2024   0932 CBC auto differential(!) [DT]   0937 Comprehensive metabolic panel(!) [DT]   1022 Urinalysis, Reflex to Urine Culture Urine, Clean Catch(!) [DT]   1024 FINDINGS:  Evaluation of the solid organs is limited due to lack of intravenous contrast.     Lower chest: Atelectasis and/or scar at the lung bases.     URINARY COLLECTING SYSTEM: No evidence of obstructive uropathy.  Prominent vascular calcifications noted at the renal renee.  Possible additional small nonobstructing renal calculi bilaterally.  Asymmetric left-sided perinephric stranding.     Liver: Findings in keeping with hepatic steatosis.     Gallbladder and bile ducts: Unremarkable.     Pancreas: Normal contour.     Spleen: Normal contour.     Adrenals: Normal contour.     Lymph nodes: No abdominal or pelvic lymphadenopathy.     Bowel and mesentery: No evidence of bowel obstruction.  Submucosal fat attenuation involving the proximal stomach, as well as cecum and proximal colon, possibly sequela of chronic nonspecific inflammation.  Colonic diverticulosis.  Normal appendix.     Abdominal aorta: Nonaneurysmal.  Heavy atherosclerotic calcification.     Inferior vena cava: Unremarkable.     Free fluid or free air: None.     Pelvis: Enlarged prostate.  Mild circumferential wall thickening of the urinary bladder.     Body wall: Small fat containing inguinal hernias bilaterally.     Bones: Remote healed left-sided rib fractures.  No definite acute findings.  Degenerative findings of the spine.     Impression:     1. No definite evidence of obstructive uropathy.  Nonspecific mild asymmetric left-sided perinephric stranding,  could relate to sequela of previously passed stone versus infectious/noninfectious inflammatory etiology.  Clinical correlation recommended.  2. Nonspecific circumferential wall thickening the urinary bladder could relate to bladder outlet obstruction given enlargement of the prostate gland.  Infectious or noninfectious inflammatory cystitis could appear similar.  3. Additional details of chronic and incidental findings, as provided in the body of the report.      [DT]   1043 Patient states he is feeling improvement in his condition at this time.  Case has been reviewed with Dr. Devin Cole.  We will treat patient from a kidney stone standpoint as it appears he may have recently passed a stone.  There is no infectious process noted on the urinary analysis.  Patient states he can not take any NSAID therefore we will treat with Percocet at home for pain control.  A referral to Urology will also be placed.  The patient is nontoxic in appearance and is otherwise stable at this time for discharge with strict return precautions given. [DT]   1048 Also notified to follow up with cardiologist as he heavy atherosclerotic calcifications. [DT]      ED Course User Index  [DT] Juliet Juan NP                           Clinical Impression:  Final diagnoses:  [R10.9] Flank pain (Primary)  [N20.0] Kidney stone  [N40.0] Enlarged prostate  [I70.90] Atherosclerotic plaque          ED Disposition Condition    Discharge Stable          ED Prescriptions       Medication Sig Dispense Start Date End Date Auth. Provider    oxyCODONE-acetaminophen (PERCOCET) 5-325 mg per tablet Take 1 tablet by mouth every 6 (six) hours as needed for Pain. 8 tablet 9/3/2024 9/5/2024 Juliet Juan NP          Follow-up Information       Follow up With Specialties Details Why Contact Info    Shila Acosta MD Internal Medicine Schedule an appointment as soon as possible for a visit in 2 days  [address]  [facility]  [address]  [phone]      Colin Murray MD Urology Schedule an appointment as soon as possible for a visit in 2 days  [address] [facility]  5th [address]  [facility] [address]  [phone]               Juliet Juan, NP  09/03/24 1054

## 2024-09-03 NOTE — ED TRIAGE NOTES
Patient arrives to the ED w/ complaints of L. Flank and L. Leg pain x 4 days. Reports pain 9 out of 10, stabbing, and constant. Reports that he has experienced a similar pain years ago when he had kidney stones. Reports L. Leg pain worsens w/ walking, bearing weight.  Denies changes in urinary pattern, blood in urine. Denies trauma or injury.   Reports taking ibuprofen w/o relief.

## 2024-09-03 NOTE — Clinical Note
"Mina RICHARDSON "Mina Jo was seen and treated in our emergency department on 9/3/2024.  He may return to work on 09/05/2024.       If you have any questions or concerns, please don't hesitate to call.      Juliet Juan NP"

## (undated) DEVICE — SUT 3-0 12-18IN SILK

## (undated) DEVICE — DRESSING TRANS 4X4 TEGADERM

## (undated) DEVICE — ADHESIVE DERMABOND ADVANCED

## (undated) DEVICE — SET DECANTER MEDICHOICE

## (undated) DEVICE — APPLICATOR CHLORAPREP ORN 26ML

## (undated) DEVICE — PACK DRAPE UNIVERSAL CONVERTOR

## (undated) DEVICE — KIT SHUNT ARTERY 6
Type: IMPLANTABLE DEVICE | Site: ARTERIAL | Status: NON-FUNCTIONAL
Removed: 2017-11-17

## (undated) DEVICE — SEE MEDLINE ITEM 152622

## (undated) DEVICE — DRESSING TELFA STRL 4X3 LF

## (undated) DEVICE — STAPLER SKIN ROTATING HEAD

## (undated) DEVICE — SUT 2/0 36IN COATED VICRYL

## (undated) DEVICE — LOOP VESSEL BLUE MAXI

## (undated) DEVICE — GOWN SURGICAL X-LARGE

## (undated) DEVICE — DRAIN PENROSE XRAY 12 X 1/4 ST

## (undated) DEVICE — SYR ONLY LUER LOCK 20CC

## (undated) DEVICE — SUT MCRYL PLUS 4-0 PS2 27IN

## (undated) DEVICE — SEE MEDLINE ITEM 156911

## (undated) DEVICE — SUT PROLENE 6-0 C-1 30IN BL

## (undated) DEVICE — CLIP MED TICALL

## (undated) DEVICE — CLOSURE SKIN STERI STRIP 1/4X3

## (undated) DEVICE — SEE MEDLINE ITEM 153688

## (undated) DEVICE — SYR 1CC TB SG 27GX1/2

## (undated) DEVICE — ELECTRODE REM PLYHSV RETURN 9

## (undated) DEVICE — SUT 7/0 18IN PROLENE BL MO

## (undated) DEVICE — CLOSURE SKIN STERI STRIP 1/2X4

## (undated) DEVICE — SEE MEDLINE ITEM 157194

## (undated) DEVICE — DRAPE STERI INSTRUMENT 1018

## (undated) DEVICE — HEMOSTAT SURGICEL 4X8IN